# Patient Record
Sex: FEMALE | Race: WHITE | NOT HISPANIC OR LATINO | Employment: PART TIME | URBAN - METROPOLITAN AREA
[De-identification: names, ages, dates, MRNs, and addresses within clinical notes are randomized per-mention and may not be internally consistent; named-entity substitution may affect disease eponyms.]

---

## 2017-01-03 ENCOUNTER — OFFICE VISIT (OUTPATIENT)
Dept: MEDICAL GROUP | Facility: MEDICAL CENTER | Age: 57
End: 2017-01-03
Payer: COMMERCIAL

## 2017-01-03 VITALS
BODY MASS INDEX: 39.19 KG/M2 | WEIGHT: 258.6 LBS | TEMPERATURE: 98.1 F | HEIGHT: 68 IN | HEART RATE: 74 BPM | RESPIRATION RATE: 12 BRPM | DIASTOLIC BLOOD PRESSURE: 88 MMHG | OXYGEN SATURATION: 95 % | SYSTOLIC BLOOD PRESSURE: 118 MMHG

## 2017-01-03 DIAGNOSIS — J06.9 ACUTE URI: ICD-10-CM

## 2017-01-03 DIAGNOSIS — F33.1 MODERATE EPISODE OF RECURRENT MAJOR DEPRESSIVE DISORDER (HCC): ICD-10-CM

## 2017-01-03 PROCEDURE — 99203 OFFICE O/P NEW LOW 30 MIN: CPT | Performed by: NURSE PRACTITIONER

## 2017-01-03 ASSESSMENT — PATIENT HEALTH QUESTIONNAIRE - PHQ9: CLINICAL INTERPRETATION OF PHQ2 SCORE: 0

## 2017-01-03 NOTE — PROGRESS NOTES
"Chief Complaint   Patient presents with   • Otalgia     patient thinks she had the flu 1 week ago, she had cough, sore throat & fever, that has mostly gone away but she now has left ear pain, swollen glands & nasal congestion       HPI:  Patient is seen today with a week history of stuffy nose, ST, ear pain and cough. She is feeling better daily with exception of left ear pain and swollen glands. She denies chest pain, palpitations, shortness of breath, fever, chills, nausea, vomiting, rash or other associated symptoms. She has tried mucinex with a bit of relief.  Nonsmoker.   is sick too.      PMH,FH and medications reviewed and updated in the record.     Review of Systems   Negative for abdominal pain, wheezing, diarrhea    Exam:  Blood pressure 118/88, pulse 74, temperature 36.7 °C (98.1 °F), resp. rate 12, height 1.727 m (5' 8\"), weight 117.3 kg (258 lb 9.6 oz), SpO2 95 %.  Constitutional: Alert, no distress, plus 3 vital signs  Skin:  Warm, dry, no rashes invisible areas  Eye: Equal, round and reactive, conjunctiva clear  ENMT: Bilateral tympanic membranes are erythematous without perforation or tenderness. No maxillary sinus tenderness. Oropharynx is slightly injected without exudate. No tonsillar enlargement..    Neck: Mild anterior cervical, nontender lymphadenopathy  Respiratory: Unlabored respiration, lungs clear to auscultation, no wheezes, no rhonchi  Cardiovascular: Normal rate and rhythm, no murmur, no edema  Psych: Alert, pleasant, well-groomed, normal affect    A/P:  1. Acute URI  -Symptoms are improving. We'll refrain from antibiotics for the next 72 hours at least. She will email Friday if ear pain has not resolved. Okay to continue Mucinex, add on Sudafed for decongestant, push water intake and rest.    2. Moderate episode of recurrent major depressive disorder (HCC)  -Stable on Abilify and Cymbalta. She'll continue psychiatry.    Recommend saline nasal sprays, high water intake, vitamin C " and rest. The patient will follow up within 10 days if not completely resolved, sooner with any worsening.    No Follow-up on file.

## 2017-01-03 NOTE — MR AVS SNAPSHOT
"        Amy Denson   1/3/2017 9:20 AM   Office Visit   MRN: [id number]    Department:  [address]   Dept Phone:  [phone]    Description:  Female : 1960   Provider:  CLAUDIO Rodriguez           Reason for Visit     Otalgia patient thinks she had the flu 1 week ago, she had cough, sore throat & fever, that has mostly gone away but she now has left ear pain, swollen glands & nasal congestion      Allergies as of 1/3/2017     Not on File      You were diagnosed with     Acute URI   [621698]       Moderate episode of recurrent major depressive disorder (HCC)   [4846616]         Vital Signs     Blood Pressure Pulse Temperature Respirations Height Weight    118/88 mmHg 74 36.7 °C (98.1 °F) 12 1.727 m (5' 8\") 117.3 kg (258 lb 9.6 oz)    Body Mass Index Oxygen Saturation Smoking Status             39.33 kg/m2 95% Never Smoker          Basic Information     Date Of Birth Sex Race Ethnicity Preferred Language    1960 Female White Non- English      Health Maintenance     Patient has no pending health maintenance at this time      Current Immunizations     No immunizations on file.      Below and/or attached are the medications your provider expects you to take. Review all of your home medications and newly ordered medications with your provider and/or pharmacist. Follow medication instructions as directed by your provider and/or pharmacist. Please keep your medication list with you and share with your provider. Update the information when medications are discontinued, doses are changed, or new medications (including over-the-counter products) are added; and carry medication information at all times in the event of emergency situations     Allergies:  (Not on file)          Medications  Valid as of: 2017 -  9:44 AM    Generic Name Brand Name Tablet Size Instructions for use    .                 Medicines prescribed today were sent to:     None      Medication refill " instructions:       If your prescription bottle indicates you have medication refills left, it is not necessary to call your provider’s office. Please contact your pharmacy and they will refill your medication.    If your prescription bottle indicates you do not have any refills left, you may request refills at any time through one of the following ways: The online [employer] system (except Urgent Care), by calling your provider’s office, or by asking your pharmacy to contact your provider’s office with a refill request. Medication refills are processed only during regular business hours and may not be available until the next business day. Your provider may request additional information or to have a follow-up visit with you prior to refilling your medication.   *Please Note: Medication refills are assigned a new Rx number when refilled electronically. Your pharmacy may indicate that no refills were authorized even though a new prescription for the same medication is available at the pharmacy. Please request the medicine by name with the pharmacy before contacting your provider for a refill.           [employer] Access Code: [email or web address]  Expires: 2/2/2017  9:44 AM    [employer]  A secure, online tool to manage your health information     [email or web address]’s Easy[employer] Universe® is a secure, online tool that connects you to your personalized health information from the privacy of your home -- day or night - making it very easy for you to manage your healthcare. Once the activation process is completed, you can even access your medical information using the Easy[employer] Universe betina, which is available for free in the Apple Betina store or Google Play store.     Easy[employer] Universe provides the following levels of access (as shown below):   My Chart Features   [facility] Doctor [facility]  Specialists [facility]  Urgent  Care Non-[facility]  Primary Care  Doctor   Email your healthcare team securely and privately 24/7 X X X    Manage appointments: schedule your next  appointment; view details of past/upcoming appointments X      Request prescription refills. X      View recent personal medical records, including lab and immunizations X X X X   View health record, including health history, allergies, medications X X X X   Read reports about your outpatient visits, procedures, consult and ER notes X X X X   See your discharge summary, which is a recap of your hospital and/or ER visit that includes your diagnosis, lab results, and care plan. X X       How to register for [email or web address]:  1. Go to  [email or web address].  2. Click on the Sign Up Now box, which takes you to the New Member Sign Up page. You will need to provide the following information:  a. Enter your [employer] Access Code exactly as it appears at the top of this page. (You will not need to use this code after you’ve completed the sign-up process. If you do not sign up before the expiration date, you must request a new code.)   b. Enter your date of birth.   c. Enter your home email address.   d. Click Submit, and follow the next screen’s instructions.  3. Create a [employer] ID. This will be your [employer] login ID and cannot be changed, so think of one that is secure and easy to remember.  4. Create a [employer] password. You can change your password at any time.  5. Enter your Password Reset Question and Answer. This can be used at a later time if you forget your password.   6. Enter your e-mail address. This allows you to receive e-mail notifications when new information is available in [email or web address].  7. Click Sign Up. You can now view your health information.    For assistance activating your [email or web address] account, call [phone]

## 2017-01-05 ENCOUNTER — TELEPHONE (OUTPATIENT)
Dept: MEDICAL GROUP | Facility: MEDICAL CENTER | Age: 57
End: 2017-01-05

## 2017-01-05 ENCOUNTER — OFFICE VISIT (OUTPATIENT)
Dept: BEHAVIORAL HEALTH | Facility: PHYSICIAN GROUP | Age: 57
End: 2017-01-05
Payer: COMMERCIAL

## 2017-01-05 VITALS
WEIGHT: 260 LBS | BODY MASS INDEX: 39.4 KG/M2 | DIASTOLIC BLOOD PRESSURE: 93 MMHG | HEART RATE: 88 BPM | SYSTOLIC BLOOD PRESSURE: 140 MMHG | HEIGHT: 68 IN

## 2017-01-05 DIAGNOSIS — J06.9 ACUTE URI: ICD-10-CM

## 2017-01-05 DIAGNOSIS — F33.42 MDD (MAJOR DEPRESSIVE DISORDER), RECURRENT, IN FULL REMISSION (HCC): Primary | ICD-10-CM

## 2017-01-05 PROCEDURE — 99213 OFFICE O/P EST LOW 20 MIN: CPT | Performed by: PSYCHIATRY & NEUROLOGY

## 2017-01-05 RX ORDER — ARIPIPRAZOLE 5 MG/1
5 TABLET ORAL DAILY
Qty: 30 TAB | Refills: 3 | Status: SHIPPED | OUTPATIENT
Start: 2017-01-05 | End: 2017-05-01 | Stop reason: SDUPTHER

## 2017-01-05 RX ORDER — AZITHROMYCIN 250 MG/1
TABLET, FILM COATED ORAL
Qty: 6 TAB | Refills: 0 | Status: SHIPPED | OUTPATIENT
Start: 2017-01-05 | End: 2017-01-27

## 2017-01-05 RX ORDER — DULOXETIN HYDROCHLORIDE 60 MG/1
60 CAPSULE, DELAYED RELEASE ORAL DAILY
Qty: 30 CAP | Refills: 3 | Status: SHIPPED | OUTPATIENT
Start: 2017-01-05 | End: 2017-05-23 | Stop reason: SDUPTHER

## 2017-01-05 NOTE — PROGRESS NOTES
PSYCHIATRY FOLLOW-UP NOTE      Chief Complaint   Patient presents with   • Follow-Up     depression         History Of Present Illness:  Amy Denson is a 56 y.o. old female with major depressive disorder comes in today for follow up, was last seen 2 months ago. She reports doing a lot better in the last few months. She has been compliant with her Cymbalta and Abilify and feels that it is a really good combination for her mood. Denies feeling depressed, emotional or irritable lately. She denies problems with energy or motivation. She continues to enjoy her life and spending time with her family. Denies any new stressors. Sleep has improved since she switched her medications to morning, is no longer using melatonin to help her sleep. Appetite is good and has gained a few pounds in the last few months which she attributes to holidays. Denies having any visual problems anymore. Denies side effects from her current medications. Denies having thoughts of wanting to herself or others.    Social History:   Unemployed, on disability for depression since 2004.  x 2 and  x 1,  to her 2nd  since 2009. She has one daughter from her 1st marriage who is 30 yo and lives in [place], [place]. 3 grand kids. Lives with her  in a trailer in [place]    Substance Use:  Alcohol - Infrequent use  Nicotine - Denies  Illicit drugs - Denies    Past Medication Trials:  Celexa, Zoloft, Prozac, Paxil, Elavil, Lexapro, Effexor, Geodon, Seroquel    Medications:  Current Outpatient Prescriptions   Medication Sig Dispense Refill   • aripiprazole (ABILIFY) 5 MG tablet Take 1 Tab by mouth every day. 30 Tab 3   • duloxetine (CYMBALTA) 60 MG Cap DR Particles delayed-release capsule Take 1 Cap by mouth every day. 30 Cap 3   • MINIVELLE 0.05 MG/24HR PATCH BIWEEKLY APPLY ONE PATCH TOPICALLY 2 TIMES WEEK  11   • triamcinolone acetonide (KENALOG) 0.025 % CREA Apply 1 Application to affected area(s) 2 times a day. 45 g  "2     No current facility-administered medications for this visit.       Review Of Systems:    Constitutional - Negative for fatigue  HEENT - Positive for left ear pain  Respiratory - Negative for shortness of breath, cough  CVS - Negative for chest pain, palpitations  GI - Negative for nausea, vomiting, abdominal pain, diarrhea, constipation  Musculoskeletal - Negative for back pain  Neurological - Negative for headaches  Psychiatric - Negative for anxiety, depression    Physical Examination:  Vital signs: /93 mmHg  Pulse 88  Ht 1.727 m (5' 8\")  Wt 117.935 kg (260 lb)  BMI 39.54 kg/m2    Musculoskeletal: Normal gait. No abnormal movements.     Mental Status Evaluation:   General: Middle aged white female, dressed in casual attire, good grooming and hygiene, in no apparent distress, calm and cooperative, good eye contact, no psychomotor agitation or retardation  Orientation: Alert and oriented to person, place and time  Recent and remote memory: Grossly intact  Attention span and concentration: Grossly intact  Speech: Spontaneous, normal rate, rhythm and tone  Thought Process: Linear, logical and goal directed  Thought Content: Denies suicidal or homicidal ideations, intent or plan  Perception: Denies auditory or visual hallucinations. No delusions noted  Associations: Intact  Language: Appropriate  Fund of knowledge and vocabulary: Grossly adequate  Mood: \"am doing good\"  Affect: Euthymic, mood congruent  Insight: Good  Judgment: Good      Impression:  1. Major depressive disorder, recurrent, in full remission    Medical Records/Labs/Diagnostic Tests Reviewed:  [place]  records - no recent controlled medications  Reviewed recent lab results - A1c elevated at 5.9, LDL elevated at 121    Plan:  1. Continue Cymbalta 60 mg daily for mood  2. Continue Abilify 5 mg daily for mood              - AIMS 0 (9/2016)              - 3 month metabolic monitoring (11/2016): A1c elevated at 5.9, LDL elevated at " 121.              - Repeat A1c and lipid profile due in 11/2017   - Discussed dietary modification and exercise      Return to clinic in 4 months or sooner if symptoms worsen    The proposed treatment plan was discussed with the patient who was provided the opportunity to ask questions and make suggestions regarding alternative treatment. Patient verbalized understanding and expressed agreement with the plan.     Kimmy Gonzalez M.D.  01/05/2017    This note was created using voice recognition software (Dragon). The accuracy of the dictation is limited by the abilities of the software. I have reviewed the note prior to signing, however some errors in grammar and context are still possible. If you have any questions related to this note please do not hesitate to contact our office.

## 2017-01-27 ENCOUNTER — OFFICE VISIT (OUTPATIENT)
Dept: MEDICAL GROUP | Facility: MEDICAL CENTER | Age: 57
End: 2017-01-27
Payer: COMMERCIAL

## 2017-01-27 VITALS
BODY MASS INDEX: 39.56 KG/M2 | SYSTOLIC BLOOD PRESSURE: 132 MMHG | HEART RATE: 83 BPM | WEIGHT: 261 LBS | TEMPERATURE: 97 F | DIASTOLIC BLOOD PRESSURE: 84 MMHG | HEIGHT: 68 IN | OXYGEN SATURATION: 97 %

## 2017-01-27 DIAGNOSIS — H69.92 EUSTACHIAN TUBE DYSFUNCTION, LEFT: ICD-10-CM

## 2017-01-27 DIAGNOSIS — F33.42 MDD (MAJOR DEPRESSIVE DISORDER), RECURRENT, IN FULL REMISSION (HCC): ICD-10-CM

## 2017-01-27 PROCEDURE — 99214 OFFICE O/P EST MOD 30 MIN: CPT | Performed by: NURSE PRACTITIONER

## 2017-01-27 RX ORDER — FLUTICASONE PROPIONATE 50 MCG
2 SPRAY, SUSPENSION (ML) NASAL DAILY
Qty: 16 G | Refills: 5 | Status: SHIPPED | OUTPATIENT
Start: 2017-01-27 | End: 2019-10-14

## 2017-01-27 NOTE — PROGRESS NOTES
"Chief Complaint   Patient presents with   • Follow-Up   • Medication Management       HPI  Amy is a 56-year-old established female here with a few issues:  #1-she continues with left ear fullness and sore throat since our last visit on January 5. She denies any fevers, chills or purulent mucus production. Occasionally her ear feels like it's going to pop. Denies any other associated symptoms. Has tried Sudafed numerous times without improvement.  Finished zpak several weeks ago and did feel 90% better.    #2-major depressive disorder: She is very happy with her improvement. She is currently doing well with Cymbalta and Abilify. She is very diligent about taking Cymbalta and Abilify on a daily basis. She is sleeping well at night and her appetite is great. Her motivation is improving by the day for exercise and activity. Denies any suicidal or homicidal ideations.  #3-obesity: Worsening issue for her. Reports a very good appetite and overindulging in sweets as well as carbohydrates. She is trying to drink a lot of water. She's been walking her dog for 10 minutes a day.      Past medical, surgical, family, and social history is reviewed and updated in Epic chart by me today.   Medications and allergies reviewed and updated in Epic chart by me today.     ROS:   Denies n/v/d    Exam:  Blood pressure 132/84, pulse 83, temperature 36.1 °C (97 °F), height 1.727 m (5' 7.99\"), weight 118.389 kg (261 lb), last menstrual period 12/27/2016, SpO2 97 %, not currently breastfeeding.  Constitutional: Overweight female, Alert, no distress, plus 3 vital signs  Skin:  Warm, dry, no rashes invisible areas  Eye: Equal, round and reactive, conjunctiva clear  ENMT: Lips without lesions, good dentition, oropharynx clear . Left tympanic membrane is retracted but translucent without obvious effusion or perforation.   Neck: Trachea midline, no masses.  Respiratory: Unlabored respiration, lungs clear to auscultation  Cardiovascular: " Normal rate   Abdomen: Soft, nontender  Psych: Alert, pleasant, well-groomed, normal affect    A/P:  1. Eustachian tube dysfunction, left  -Trial of Flonase 2 sprays each nostril once daily for the next 2 weeks and she is agreeable to emailing me if not improving.  - fluticasone (FLONASE) 50 MCG/ACT nasal spray; Spray 2 Sprays in nose every day.  Dispense: 16 g; Refill: 5    2. MDD (major depressive disorder), recurrent, in full remission (CMS-[facility])  -Improved. She'll continue on same medications. She'll follow up with psychiatry as scheduled in May.     3. BMI 39.0-39.9,adult  -We had a long discussion about avoiding all white carbohydrates and sugars. Discussed a high-fiber, lean protein diet. Encouraged her to work up to 30 minutes of walking her dog daily. Discussed buying a scale and weighing herself every morning, making sure she does not go beyond 260 pounds. She'll follow-up with me in 3 months and will check an A1c in the office. Discussed potential for weight gain with Abilify and she verbalized understanding.

## 2017-01-27 NOTE — MR AVS SNAPSHOT
"Amy Denson   2017 1:00 PM   Office Visit   MRN: [id number]    Department:  [address]   Dept Phone:  [phone]    Description:  Female : 1960   Provider:  CLAUDIO Rodriguez           Reason for Visit     Follow-Up     Medication Management           Allergies as of 2017     Allergen Noted Reactions    Augmentin [Amoxicillin-Pot Clavulanate] 2014   Diarrhea    Sulfa Drugs 2013       Rash      You were diagnosed with     Eustachian tube dysfunction, left   [690188]       MDD (major depressive disorder), recurrent, in full remission (CMS-HCC)   [380936]         Vital Signs     Blood Pressure Pulse Temperature Height Weight Body Mass Index    132/84 mmHg 83 36.1 °C (97 °F) 1.727 m (5' 7.99\") 118.389 kg (261 lb) 39.69 kg/m2    Oxygen Saturation Last Menstrual Period Breastfeeding? Smoking Status          97% 2016 No Never Smoker         Basic Information     Date Of Birth Sex Race Ethnicity Preferred Language    1960 Female White Non- English      Your appointments     May 05, 2017  9:30 AM   Follow Up Med Management with Kimmy Gonzalez M.D.   [facility] [address] [place] [address])    [address] [place] [id number]   [phone]              Problem List              ICD-10-CM Priority Class Noted - Resolved    Obesity E66.9   2011 - Present    Thyroid nodule E04.1   2015 - Present    Abnormal vaginal bleeding N93.9   2015 - Present    MDD (major depressive disorder), recurrent, in full remission (CMS-HCC) F33.42   2017 - Present      Health Maintenance        Date Due Completion Dates    IMM DTaP/Tdap/Td Vaccine (1 - Tdap) 3/19/1981 ---    MAMMOGRAM 2016, 2013, 12/15/2011, 2007, 2007    IMM INFLUENZA (1) 2016 ---    PAP SMEAR 2018 (Done)    Override on 2015: Done (Dr. Boogie)    COLONOSCOPY 2018            Current Immunizations     No " immunizations on file.      Below and/or attached are the medications your provider expects you to take. Review all of your home medications and newly ordered medications with your provider and/or pharmacist. Follow medication instructions as directed by your provider and/or pharmacist. Please keep your medication list with you and share with your provider. Update the information when medications are discontinued, doses are changed, or new medications (including over-the-counter products) are added; and carry medication information at all times in the event of emergency situations     Allergies:  AUGMENTIN - Diarrhea     SULFA DRUGS - (reactions not documented)               Medications  Valid as of: January 27, 2017 -  1:19 PM    Generic Name Brand Name Tablet Size Instructions for use    ARIPiprazole (Tab) ABILIFY 5 MG Take 1 Tab by mouth every day.        Azithromycin (Tab) ZITHROMAX 250  mg day one, 250 mg day 2-5        DULoxetine HCl (Cap DR Particles) CYMBALTA 60 MG Take 1 Cap by mouth every day.        Estradiol (PATCH BIWEEKLY) MINIVELLE 0.05 MG/24HR APPLY ONE PATCH TOPICALLY 2 TIMES WEEK        Fluticasone Propionate (Suspension) FLONASE 50 MCG/ACT Spray 2 Sprays in nose every day.        Triamcinolone Acetonide (Cream) KENALOG 0.025 % Apply 1 Application to affected area(s) 2 times a day.        .                 Medicines prescribed today were sent to:     [id number] [address]    [address] [facility] [address] [address]    Phone: [phone] Fax: [phone]    Open 24 Hours?: No      Medication refill instructions:       If your prescription bottle indicates you have medication refills left, it is not necessary to call your provider’s office. Please contact your pharmacy and they will refill your medication.    If your prescription bottle indicates you do not have any refills left, you may request refills at any time through one of the following ways: The online [employer]  system (except Urgent Care), by calling your provider’s office, or by asking your pharmacy to contact your provider’s office with a refill request. Medication refills are processed only during regular business hours and may not be available until the next business day. Your provider may request additional information or to have a follow-up visit with you prior to refilling your medication.   *Please Note: Medication refills are assigned a new Rx number when refilled electronically. Your pharmacy may indicate that no refills were authorized even though a new prescription for the same medication is available at the pharmacy. Please request the medicine by name with the pharmacy before contacting your provider for a refill.           [employer] Access Code: Activation code not generated  Current [facility] Status: Active

## 2017-04-27 ENCOUNTER — OFFICE VISIT (OUTPATIENT)
Dept: MEDICAL GROUP | Facility: LAB | Age: 57
End: 2017-04-27
Payer: COMMERCIAL

## 2017-04-27 VITALS
WEIGHT: 276 LBS | RESPIRATION RATE: 12 BRPM | TEMPERATURE: 99.3 F | DIASTOLIC BLOOD PRESSURE: 80 MMHG | SYSTOLIC BLOOD PRESSURE: 130 MMHG | HEIGHT: 68 IN | HEART RATE: 86 BPM | BODY MASS INDEX: 41.83 KG/M2 | OXYGEN SATURATION: 94 %

## 2017-04-27 DIAGNOSIS — R73.03 PREDIABETES: ICD-10-CM

## 2017-04-27 DIAGNOSIS — F33.42 MDD (MAJOR DEPRESSIVE DISORDER), RECURRENT, IN FULL REMISSION (HCC): ICD-10-CM

## 2017-04-27 LAB
HBA1C MFR BLD: NORMAL % (ref ?–5.8)
INT CON NEG: NORMAL
INT CON POS: NORMAL

## 2017-04-27 PROCEDURE — 83036 HEMOGLOBIN GLYCOSYLATED A1C: CPT | Performed by: NURSE PRACTITIONER

## 2017-04-27 PROCEDURE — 99214 OFFICE O/P EST MOD 30 MIN: CPT | Performed by: NURSE PRACTITIONER

## 2017-04-27 NOTE — MR AVS SNAPSHOT
"Amy Denson   2017 11:00 AM   Office Visit   MRN: [id number]    Department:  [facility]   Dept Phone:  [phone]    Description:  Female : 1960   Provider:  CLAUDIO Rodriguez           Reason for Visit     Depression F/V on depression and anxiety, pt states she is doing much better    Other pt states she had high BP reading when going to the dentist, 162/108,     Orders Needed mammogram       Allergies as of 2017     Allergen Noted Reactions    Augmentin [Amoxicillin-Pot Clavulanate] 2014   Diarrhea    Sulfa Drugs 2013       Rash      You were diagnosed with     Prediabetes   [468677]       Elevated blood pressure   [811642]         Vital Signs     Blood Pressure Pulse Temperature Respirations Height Weight    130/80 mmHg 86 37.4 °C (99.3 °F) 12 1.727 m (5' 7.99\") 125.193 kg (276 lb)    Body Mass Index Oxygen Saturation Smoking Status             41.98 kg/m2 94% Never Smoker          Basic Information     Date Of Birth Sex Race Ethnicity Preferred Language    1960 Female White Non- English      Your appointments     May 05, 2017  9:30 AM   Follow Up Med Management with Kimmy Gonzalez M.D.   [facility] [address] [place] [address])    [address] [place] [address] [phone]            May 09, 2017 10:00 AM   Established Patient with CLAUDIO Rodriguez   [facility] (--)    [address] [address] [address] [phone]           You will be receiving a confirmation call a few days before your appointment from our automated call confirmation system.              Problem List              ICD-10-CM Priority Class Noted - Resolved    Obesity E66.9   2011 - Present    Thyroid nodule E04.1   2015 - Present    Abnormal vaginal bleeding N93.9   2015 - Present    MDD (major depressive disorder), recurrent, in full remission (CMS-HCC) F33.42   2017 - Present   "      Health Maintenance        Date Due Completion Dates    IMM DTaP/Tdap/Td Vaccine (1 - Tdap) 3/19/1981 ---    MAMMOGRAM 6/29/2016 6/29/2015, 2/1/2013, 12/15/2011, 2/14/2007, 2/14/2007    PAP SMEAR 6/2/2018 6/2/2015 (Done)    Override on 6/2/2015: Done (Dr. Boogie)    COLONOSCOPY 7/17/2018 7/17/2015            Current Immunizations     No immunizations on file.      Below and/or attached are the medications your provider expects you to take. Review all of your home medications and newly ordered medications with your provider and/or pharmacist. Follow medication instructions as directed by your provider and/or pharmacist. Please keep your medication list with you and share with your provider. Update the information when medications are discontinued, doses are changed, or new medications (including over-the-counter products) are added; and carry medication information at all times in the event of emergency situations     Allergies:  AUGMENTIN - Diarrhea     SULFA DRUGS - (reactions not documented)               Medications  Valid as of: April 27, 2017 - 11:45 AM    Generic Name Brand Name Tablet Size Instructions for use    ARIPiprazole (Tab) ABILIFY 5 MG Take 1 Tab by mouth every day.        DULoxetine HCl (Cap DR Particles) CYMBALTA 60 MG Take 1 Cap by mouth every day.        Estradiol (PATCH BIWEEKLY) MINIVELLE 0.05 MG/24HR APPLY ONE PATCH TOPICALLY 2 TIMES WEEK        Fluticasone Propionate (Suspension) FLONASE 50 MCG/ACT Spray 2 Sprays in nose every day.        Triamcinolone Acetonide (Cream) KENALOG 0.025 % Apply 1 Application to affected area(s) 2 times a day.        .                 Medicines prescribed today were sent to:     [facility]/PHARMACY #[id number] - [place] - [address]    [address] [facility] [place] [address]    Phone: [phone] Fax: [phone]    Open 24 Hours?: No      Medication refill instructions:       If your prescription bottle indicates you have medication refills left, it is not necessary  to call your provider’s office. Please contact your pharmacy and they will refill your medication.    If your prescription bottle indicates you do not have any refills left, you may request refills at any time through one of the following ways: The online [employer] system (except Urgent Care), by calling your provider’s office, or by asking your pharmacy to contact your provider’s office with a refill request. Medication refills are processed only during regular business hours and may not be available until the next business day. Your provider may request additional information or to have a follow-up visit with you prior to refilling your medication.   *Please Note: Medication refills are assigned a new Rx number when refilled electronically. Your pharmacy may indicate that no refills were authorized even though a new prescription for the same medication is available at the pharmacy. Please request the medicine by name with the pharmacy before contacting your provider for a refill.           [employer] Access Code: Activation code not generated  Current [employer] Status: Active

## 2017-04-27 NOTE — PROGRESS NOTES
Chief Complaint   Patient presents with   • Depression     F/V on depression and anxiety, pt states she is doing much better   • Other     pt states she had high BP reading when going to the dentist, 162/108,    • Orders Needed     mammogram        HPI  Amy is a 56 yo est female here with a few issues:   #1- new onset elevated BP:  She reports that she was at her dentist's office 2 days ago and she was rescheduled for some fillings because of a blood pressure 162/108. She reports that a wrist BP monitor was used @ dentist.  Her dentist is not new to her and she states that she was not in pain. She denies having any chest pain, leg swelling or shortness of breath.  She had missed her abilify and cymbalta the day prior to her dental appt. she does have a blood pressure monitor at home and she has been checking her blood pressure periodically over the past 2 days cannot recall exact readings, she thinks they have been around 170/80. Next appt at her dentist is 5/11/2017. She reports that in the past she's been told by a suitable that her blood pressure was borderline but she's never been diagnosed or treated for hypertension.    #2-anxiety with depression: Now treated by psychiatry. Feels her moods are very stable with Abilify and Cymbalta.    #3-history of elevated A1c:  Last A1c was done in November and was 5.9. She has had unfortunately another 15 pound weight gain in the past 3 months. She is not exercising at all and she reports that she enjoys a lot of starches, such as white rice and potatoes. She typically eats oatmeal for breakfast. She does not smoke. She denies any symptoms related to diabetes. She's never been diagnosed with type 2 diabetes.     Past medical, surgical, family, and social history is reviewed and updated in Epic chart by me today.   Medications and allergies reviewed and updated in Epic chart by me today.     ROS:   As documented in history of present illness above    Exam:  Blood  "pressure 130/80, pulse 86, temperature 37.4 °C (99.3 °F), resp. rate 12, height 1.727 m (5' 7.99\"), weight 125.193 kg (276 lb), SpO2 94 %.  Constitutional: Obese female, Alert, no distress, plus 3 vital signs  Skin:  Warm, dry, no rashes invisible areas  Eye: Equal, round and reactive, conjunctiva clear  ENMT: Lips without lesions, good dentition, oropharynx clear    Neck: Trachea midline  Respiratory: Unlabored respiration, lungs clear to auscultation, no wheezes, no rhonchi  Cardiovascular: Normal rate and rhythm, no murmur, no edema  Psych: Alert, pleasant, well-groomed, normal affect    A/P:  1. Prediabetes  -Unfortunately her A1c is up to 6.2 today. We had a long discussion about lowering her carbohydrate intake. We discussed foods that are low on the glycemic index. I encouraged her to start moving more through any type of exercise that she enjoys. Recommend a recheck of her A1c in 3 months. We discussed the dangers of type 2 diabetes as well as potential repercussions. She had no further dietary questions and states that she had already planned on starting an exercise program. She will follow-up or email me if she has any questions prior to her next visit.  - POCT  A1C    2. Elevated blood pressure  -Blood pressure was fine at our visit today. She was given a blood pressure log to keep track of her blood pressure, weight and pulse for the next 2 weeks and follow up here prior to her next dental appointment. Encouraged her to avoid salt, start exercising and call if blood pressure readings are consistently greater than 150/90 prior to her next visit.    3. MDD (major depressive disorder), recurrent, in full remission (CMS-HCC)  -Much improved/stable with her current psychiatrist and medications.      "

## 2017-04-27 NOTE — Clinical Note
BLOOD PRESSURE LOG FOR: Amy Valdesbarbara Denson    DATE/TIME  AM WEIGHT BLOOD  PRESSURE PULSE TIME  PM BLOOD  PRESSURE   PULSE                                                                                                                                                                                                                                        Current Blood Pressure Medications: (dose and frequency)    MEDICATION DOSE FREQUENCY   none                      Please joseph any medication change (increase/decrease/new med) with a *.

## 2017-05-05 ENCOUNTER — APPOINTMENT (OUTPATIENT)
Dept: BEHAVIORAL HEALTH | Facility: PHYSICIAN GROUP | Age: 57
End: 2017-05-05
Payer: MEDICARE

## 2017-05-09 ENCOUNTER — OFFICE VISIT (OUTPATIENT)
Dept: MEDICAL GROUP | Facility: LAB | Age: 57
End: 2017-05-09
Payer: COMMERCIAL

## 2017-05-09 VITALS
BODY MASS INDEX: 40.16 KG/M2 | HEART RATE: 87 BPM | RESPIRATION RATE: 12 BRPM | OXYGEN SATURATION: 93 % | SYSTOLIC BLOOD PRESSURE: 128 MMHG | WEIGHT: 265 LBS | DIASTOLIC BLOOD PRESSURE: 74 MMHG | HEIGHT: 68 IN | TEMPERATURE: 98.2 F

## 2017-05-09 DIAGNOSIS — F33.42 MDD (MAJOR DEPRESSIVE DISORDER), RECURRENT, IN FULL REMISSION (HCC): ICD-10-CM

## 2017-05-09 DIAGNOSIS — J06.9 ACUTE URI: ICD-10-CM

## 2017-05-09 DIAGNOSIS — R03.0 ELEVATED BLOOD PRESSURE, SITUATIONAL: ICD-10-CM

## 2017-05-09 PROCEDURE — 99214 OFFICE O/P EST MOD 30 MIN: CPT | Performed by: NURSE PRACTITIONER

## 2017-05-09 NOTE — MR AVS SNAPSHOT
"Amy Denson   2017 10:00 AM   Office Visit   MRN: [id number]    Department:  [facility]   Dept Phone:  [phone]    Description:  Female : 1960   Provider:  CLAUDIO Rodriguez           Reason for Visit     Other F/V on high BP while at the dentist     Pharyngitis pt has a slight sore throat, nasal congestion, some cough, onset 3 days      Allergies as of 2017     Allergen Noted Reactions    Augmentin [Amoxicillin-Pot Clavulanate] 2014   Diarrhea    Sulfa Drugs 2013       Rash      You were diagnosed with     Acute URI   [116623]       Elevated blood pressure, situational   [282522]       MDD (major depressive disorder), recurrent, in full remission (CMS-HCC)   [382192]         Vital Signs     Blood Pressure Pulse Temperature Respirations Height Weight    128/74 mmHg 87 36.8 °C (98.2 °F) 12 1.727 m (5' 7.99\") 120.203 kg (265 lb)    Body Mass Index Oxygen Saturation Smoking Status             40.30 kg/m2 93% Never Smoker          Basic Information     Date Of Birth Sex Race Ethnicity Preferred Language    1960 Female White Non- English      Your appointments     2017  9:00 AM   Follow Up Med Management with Kimmy Gonzalez M.D.   BEHAVIORAL HEALTH [address] [place] [address])    [address] [place] [address]   [phone]              Problem List              ICD-10-CM Priority Class Noted - Resolved    Obesity E66.9   2011 - Present    Thyroid nodule E04.1   2015 - Present    Abnormal vaginal bleeding N93.9   2015 - Present    MDD (major depressive disorder), recurrent, in full remission (CMS-HCC) F33.42   2017 - Present    Prediabetes R73.03   2017 - Present      Health Maintenance        Date Due Completion Dates    IMM DTaP/Tdap/Td Vaccine (1 - Tdap) 3/19/1981 ---    MAMMOGRAM 2016, 2013, 12/15/2011, 2007, 2007    PAP SMEAR 2018 (Done)    Override on " 6/2/2015: Done (Dr. Boogie)    COLONOSCOPY 7/17/2018 7/17/2015            Current Immunizations     No immunizations on file.      Below and/or attached are the medications your provider expects you to take. Review all of your home medications and newly ordered medications with your provider and/or pharmacist. Follow medication instructions as directed by your provider and/or pharmacist. Please keep your medication list with you and share with your provider. Update the information when medications are discontinued, doses are changed, or new medications (including over-the-counter products) are added; and carry medication information at all times in the event of emergency situations     Allergies:  AUGMENTIN - Diarrhea     SULFA DRUGS - (reactions not documented)               Medications  Valid as of: May 09, 2017 - 10:21 AM    Generic Name Brand Name Tablet Size Instructions for use    ARIPiprazole (Tab) ABILIFY 5 MG TAKE 1 TAB BY MOUTH EVERY DAY.        DULoxetine HCl (Cap DR Particles) CYMBALTA 60 MG Take 1 Cap by mouth every day.        Estradiol (PATCH BIWEEKLY) MINIVELLE 0.05 MG/24HR APPLY ONE PATCH TOPICALLY 2 TIMES WEEK        Fluticasone Propionate (Suspension) FLONASE 50 MCG/ACT Spray 2 Sprays in nose every day.        Triamcinolone Acetonide (Cream) KENALOG 0.025 % Apply 1 Application to affected area(s) 2 times a day.        .                 Medicines prescribed today were sent to:     [facility]/PHARMACY #[id number] [place], [place] - [address] [facility] [address]    [address] [facility] [address]    Phone: [phone] Fax: [phone]    Open 24 Hours?: No      Medication refill instructions:       If your prescription bottle indicates you have medication refills left, it is not necessary to call your provider’s office. Please contact your pharmacy and they will refill your medication.    If your prescription bottle indicates you do not have any refills left, you may request refills at any time through one of the following  ways: The online [employer] system (except Urgent Care), by calling your provider’s office, or by asking your pharmacy to contact your provider’s office with a refill request. Medication refills are processed only during regular business hours and may not be available until the next business day. Your provider may request additional information or to have a follow-up visit with you prior to refilling your medication.   *Please Note: Medication refills are assigned a new Rx number when refilled electronically. Your pharmacy may indicate that no refills were authorized even though a new prescription for the same medication is available at the pharmacy. Please request the medicine by name with the pharmacy before contacting your provider for a refill.           [employer] Access Code: Activation code not generated  Current [employer] Status: Active

## 2017-05-09 NOTE — PROGRESS NOTES
"Chief Complaint   Patient presents with   • Other     F/V on high BP while at the dentist    • Pharyngitis     pt has a slight sore throat, nasal congestion, some cough, onset 3 days       HPI  Amy is a 57-year-old established female here to follow-up on elevated blood pressure at her dental appointment a few weeks ago. She's been keeping track of her blood pressure at home on a home blood pressure log and it has looked great. BP range 117/71 - 143/89  - average 121/80.  She denies any problems with chest pain or leg swelling. She has been trying to walk more often and decrease her salt intake. She has lost 10 pounds since her last visit. Her next dental appointment is in 48 hours.    She's also had a cold for the past 3 days with sore throat, congestion and a cough. Her symptoms are improving. She would like for me to listen to her lungs as she heard herself wheezing this morning. Denies fevers, chills or purulent mucus production. Not taking anything for her symptoms.    Anxiety with depression: Still feeling great in terms of her moods. Doing well with Abilify and Cymbalta.    Past medical, surgical, family, and social history is reviewed and updated in Epic chart by me today.   Medications and allergies reviewed and updated in Epic chart by me today.     ROS:   As documented in history of present illness above    Exam:  Blood pressure 128/74, pulse 87, temperature 36.8 °C (98.2 °F), resp. rate 12, height 1.727 m (5' 7.99\"), weight 120.203 kg (265 lb), SpO2 93 %.  Constitutional: Alert, no distress, plus 3 vital signs  Skin:  Warm, dry, no rashes invisible areas  Eye: Equal, round and reactive, conjunctiva clear  ENMT: Lips without lesions, good dentition, oropharynx clear    Neck: Trachea midline, no masses, no thyromegaly  Respiratory: Unlabored respiration, lungs clear to auscultation, no wheezes, no rhonchi  Cardiovascular: Normal rate and rhythm, no murmur, no edema  Psych: Alert, pleasant, " well-groomed, normal affect    A/P:  1. Acute URI  -Resolving. Reviewed symptomatic treatment with her. Discussed deep breathing exercises and high water intake. No wheezing heard today.    2. Elevated blood pressure, situational  -Blood pressure log is excellent with only 2 readings over the past 2 weeks, above 140/85. Continue off medication, concentrate on a low-sodium diet, weight loss and exercise. She'll continue with her blood pressure log over the next 2 weeks and drop it off for me.    3. MDD (major depressive disorder), recurrent, in full remission (CMS-[facility])  -Doing excellent. Continue same.

## 2017-05-23 RX ORDER — DULOXETIN HYDROCHLORIDE 60 MG/1
CAPSULE, DELAYED RELEASE ORAL
Qty: 30 CAP | Refills: 0 | Status: SHIPPED | OUTPATIENT
Start: 2017-05-23 | End: 2017-08-02 | Stop reason: SDUPTHER

## 2017-06-14 ENCOUNTER — OFFICE VISIT (OUTPATIENT)
Dept: BEHAVIORAL HEALTH | Facility: PHYSICIAN GROUP | Age: 57
End: 2017-06-14
Payer: COMMERCIAL

## 2017-06-14 VITALS
WEIGHT: 280 LBS | HEART RATE: 66 BPM | HEIGHT: 68 IN | SYSTOLIC BLOOD PRESSURE: 144 MMHG | DIASTOLIC BLOOD PRESSURE: 89 MMHG | BODY MASS INDEX: 42.44 KG/M2

## 2017-06-14 DIAGNOSIS — F33.42 MDD (MAJOR DEPRESSIVE DISORDER), RECURRENT, IN FULL REMISSION (HCC): ICD-10-CM

## 2017-06-14 PROCEDURE — 99213 OFFICE O/P EST LOW 20 MIN: CPT | Performed by: PSYCHIATRY & NEUROLOGY

## 2017-06-14 RX ORDER — ARIPIPRAZOLE 2 MG/1
2 TABLET ORAL DAILY
Qty: 30 TAB | Refills: 2 | Status: SHIPPED | OUTPATIENT
Start: 2017-06-14 | End: 2017-09-14

## 2017-06-14 NOTE — PROGRESS NOTES
PSYCHIATRY FOLLOW-UP NOTE      Chief Complaint   Patient presents with   • Follow-Up     depression         History Of Present Illness:  Amy Denson is a 56 y.o. old female with major depressive disorder, prediabetes comes in today for follow up, was last seen 4 months ago. She reports doing good since her last visit here. She has been compliant with Cymbalta and Abilify and endorses benefit from both of those medications. She is concerned about weight gain being a potential side effect from Abilify. She has made some dietary modifications and has been finding it hard to lose weight. She is not physically active at this time. Denies any other side effects from either of her medications. Denies any recent crying spells or feeling irritable or agitated. Denies any current stressors. Denies anhedonia, enjoys spending time with her family. She and her  have been traveling and camping a lot which she really enjoys. Sleep and appetite have been good. Denies any current problems with anxiety.    Social History:   Unemployed, on disability for depression since 2004.  x 2 and  x 1,  to her 2nd  since 2009. She has a daughter from her first marriage and 3 grand kids and they lives in [place], [place]. She lives with her  in a trailer in [place]    Substance Use:  Alcohol - Infrequent use  Nicotine - Denies  Illicit drugs - Denies    Past Medication Trials:  Celexa, Zoloft, Prozac, Paxil, Elavil, Lexapro, Effexor, Geodon, Seroquel    Medications:  Current Outpatient Prescriptions   Medication Sig Dispense Refill   • aripiprazole (ABILIFY) 2 MG tablet Take 1 Tab by mouth every day. 30 Tab 2   • duloxetine (CYMBALTA) 60 MG Cap DR Particles delayed-release capsule TAKE 1 CAP BY MOUTH EVERY DAY. 30 Cap 0   • fluticasone (FLONASE) 50 MCG/ACT nasal spray Spray 2 Sprays in nose every day. 16 g 5   • MINIVELLE 0.05 MG/24HR PATCH BIWEEKLY APPLY ONE PATCH TOPICALLY 2 TIMES WEEK  11  "  • triamcinolone acetonide (KENALOG) 0.025 % CREA Apply 1 Application to affected area(s) 2 times a day. 45 g 2     No current facility-administered medications for this visit.       Review Of Systems:    Constitutional - Negative for fatigue  HEENT - Negative for nasal congestion, hearing loss  Respiratory - Negative for shortness of breath, cough  CVS - Negative for chest pain, palpitations  GI - Negative for nausea, vomiting, abdominal pain, diarrhea, constipation  Musculoskeletal - Negative for back pain  Neurological - Negative for headaches  Psychiatric - Negative for anxiety, depression    Physical Examination:  Vital signs: /89 mmHg  Pulse 66  Ht 1.727 m (5' 8\")  Wt 127.007 kg (280 lb)  BMI 42.58 kg/m2    Musculoskeletal: Normal gait. No abnormal movements.     Mental Status Evaluation:   General: Middle aged white female, dressed in casual attire, good grooming and hygiene, in no apparent distress, calm and cooperative, good eye contact, no psychomotor agitation or retardation  Orientation: Alert and oriented to person, place and time  Recent and remote memory: Grossly intact  Attention span and concentration: Grossly intact  Speech: Spontaneous, normal rate, rhythm and tone  Thought Process: Linear, logical and goal directed  Thought Content: Denies suicidal or homicidal ideations, intent or plan  Perception: Denies auditory or visual hallucinations. No delusions noted  Associations: Intact  Language: Appropriate  Fund of knowledge and vocabulary: Grossly adequate  Mood: \"pretty good\"  Affect: Euthymic, mood congruent  Insight: Good  Judgment: Good      Impression:  1. Major depressive disorder, recurrent, in full remission    Medical Records/Labs/Diagnostic Tests Reviewed:  [facility] records - no controlled medications in the last 1 year    Plan:  1. Continue Cymbalta 60 mg daily for depression   2. Decrease Abilify to 2 mg daily for depression augmentation given stable mood symptoms and concerns " for weight gain              - AIMS 0 (9/2016)              - 3 month metabolic monitoring (11/2016): A1c elevated at 5.9, LDL elevated at 121.              - Repeat A1c and lipid profile due in 11/2017  3. Discussed healthy eating and encouraged her to start exercising for at least 30 minutes 3-4 times a week.    Return to clinic in 3 months or sooner if symptoms worsen    The proposed treatment plan was discussed with the patient who was provided the opportunity to ask questions and make suggestions regarding alternative treatment. Patient verbalized understanding and expressed agreement with the plan.     Kimmy Gonzalez M.D.  06/14/2017    This note was created using voice recognition software (Dragon). The accuracy of the dictation is limited by the abilities of the software. I have reviewed the note prior to signing, however some errors in grammar and context are still possible. If you have any questions related to this note please do not hesitate to contact our office.

## 2017-08-02 RX ORDER — DULOXETIN HYDROCHLORIDE 60 MG/1
CAPSULE, DELAYED RELEASE ORAL
Qty: 30 CAP | Refills: 1 | Status: SHIPPED | OUTPATIENT
Start: 2017-08-02 | End: 2017-09-14 | Stop reason: SDUPTHER

## 2017-09-14 ENCOUNTER — OFFICE VISIT (OUTPATIENT)
Dept: BEHAVIORAL HEALTH | Facility: PHYSICIAN GROUP | Age: 57
End: 2017-09-14
Payer: COMMERCIAL

## 2017-09-14 VITALS
SYSTOLIC BLOOD PRESSURE: 128 MMHG | DIASTOLIC BLOOD PRESSURE: 90 MMHG | BODY MASS INDEX: 41.68 KG/M2 | HEART RATE: 68 BPM | HEIGHT: 68 IN | WEIGHT: 275 LBS

## 2017-09-14 DIAGNOSIS — Z79.899 ENCOUNTER FOR LONG-TERM (CURRENT) USE OF OTHER MEDICATIONS: ICD-10-CM

## 2017-09-14 DIAGNOSIS — F33.42 MDD (MAJOR DEPRESSIVE DISORDER), RECURRENT, IN FULL REMISSION (HCC): ICD-10-CM

## 2017-09-14 PROCEDURE — 99213 OFFICE O/P EST LOW 20 MIN: CPT | Performed by: PSYCHIATRY & NEUROLOGY

## 2017-09-14 RX ORDER — ARIPIPRAZOLE 2 MG/1
1 TABLET ORAL DAILY
Qty: 30 TAB | Refills: 1 | Status: SHIPPED | OUTPATIENT
Start: 2017-09-14 | End: 2017-12-14

## 2017-09-14 RX ORDER — DULOXETIN HYDROCHLORIDE 60 MG/1
60 CAPSULE, DELAYED RELEASE ORAL
Qty: 30 CAP | Refills: 2 | Status: SHIPPED | OUTPATIENT
Start: 2017-09-14 | End: 2017-12-14 | Stop reason: SDUPTHER

## 2017-09-14 ASSESSMENT — PATIENT HEALTH QUESTIONNAIRE - PHQ9
4. FEELING TIRED OR HAVING LITTLE ENERGY: 0
2. FEELING DOWN, DEPRESSED, IRRITABLE, OR HOPELESS: 0
5. POOR APPETITE OR OVEREATING: 0
6. FEELING BAD ABOUT YOURSELF - OR THAT YOU ARE A FAILURE OR HAVE LET YOURSELF OR YOUR FAMILY DOWN: 0
7. TROUBLE CONCENTRATING ON THINGS, SUCH AS READING THE NEWSPAPER OR WATCHING TELEVISION: 0
SUM OF ALL RESPONSES TO PHQ QUESTIONS 1-9: 1
3. TROUBLE FALLING OR STAYING ASLEEP OR SLEEPING TOO MUCH: 1
9. THOUGHTS THAT YOU WOULD BE BETTER OFF DEAD, OR OF HURTING YOURSELF: 0
8. MOVING OR SPEAKING SO SLOWLY THAT OTHER PEOPLE COULD HAVE NOTICED. OR THE OPPOSITE, BEING SO FIGETY OR RESTLESS THAT YOU HAVE BEEN MOVING AROUND A LOT MORE THAN USUAL: 0
1. LITTLE INTEREST OR PLEASURE IN DOING THINGS: 0
SUM OF ALL RESPONSES TO PHQ9 QUESTIONS 1 AND 2: 0

## 2017-09-14 NOTE — PROGRESS NOTES
PSYCHIATRY FOLLOW-UP NOTE      Chief Complaint   Patient presents with   • Follow-Up     depression         History Of Present Illness:  Amy Denson is a 57 y.o. old female with major depressive disorder, prediabetes comes in today for follow up, was last seen 3 months ago. She reports doing. Since her last visit here. She was able to cut down on her dose of Abilify and has not noticed any changes in her mood or anxiety. She continues to do good in regards to depression and has not noticed any depression in the last few months. Denies feeling irritable or angry. She denies any current stressors in her life. Energy has been good. Appetite has been stable as well. She has lost 5 pounds since her last visit and is still concerned about her weight gain which she feels is because of Abilify. She is not physically active and is not exercising on a regular basis. Denies anhedonia, enjoys spending time with her family. Denies crying spells. Denies any recent memory problems. Denies any falls recently. She continues to be compliant with Cymbalta as well and has not noticed any side effects. She has noticed that she is sleeping about 10-11 hours at night and feels it is excessive. She does not have a fixed routine to get up as she does not have a job or kids at home. She denies having passive or active thoughts of wanting to hurt herself or others.    Social History:   Unemployed, on disability for depression since 2004.  x 2 and  x 1,  to her 2nd  since 2009. She has a daughter from her first marriage and 3 grand kids and they lives in [place], [place]. She lives with her  in a trail in [place]    Substance Use:  Alcohol - Infrequent use  Nicotine - Denies  Illicit drugs - Denies    Past Medication Trials:  Celexa, Zoloft, Prozac, Paxil, Elavil, Lexapro, Effexor, Geodon, Seroquel    Medications:  Current Outpatient Prescriptions   Medication Sig Dispense Refill   •  "aripiprazole (ABILIFY) 2 MG tablet Take 0.5 Tabs by mouth every day. 30 Tab 1   • duloxetine (CYMBALTA) 60 MG Cap DR Particles delayed-release capsule Take 1 Cap by mouth every day. TAKE 1 CAP BY MOUTH EVERY DAY. 30 Cap 2   • fluticasone (FLONASE) 50 MCG/ACT nasal spray Spray 2 Sprays in nose every day. 16 g 5   • triamcinolone acetonide (KENALOG) 0.025 % CREA Apply 1 Application to affected area(s) 2 times a day. 45 g 2     No current facility-administered medications for this visit.        Review Of Systems:    Constitutional - Negative for fatigue  HEENT - Negative for nasal congestion, hearing loss  Respiratory - Negative for shortness of breath, cough  CVS - Negative for chest pain, palpitations  GI - Negative for nausea, vomiting, abdominal pain, diarrhea, constipation  Musculoskeletal - Negative for back pain  Neurological - Negative for headaches  Psychiatric - Negative for anxiety, depression    Physical Examination:  Vital signs: /90   Pulse 68   Ht 1.727 m (5' 8\")   Wt 124.7 kg (275 lb)   BMI 41.81 kg/m²     Musculoskeletal: Normal gait. No abnormal movements.     Mental Status Evaluation:   General: Middle aged white female, dressed in casual attire, good grooming and hygiene, in no apparent distress, calm and cooperative, good eye contact, no psychomotor agitation or retardation  Orientation: Alert and oriented to person, place and time  Recent and remote memory: Grossly intact  Attention span and concentration: Grossly intact  Speech: Spontaneous, normal rate, rhythm and tone  Thought Process: Linear, logical and goal directed  Thought Content: Denies suicidal or homicidal ideations, intent or plan  Perception: Denies auditory or visual hallucinations. No delusions noted  Associations: Intact  Language: Appropriate  Fund of knowledge and vocabulary: Grossly adequate  Mood: \"good\"  Affect: Euthymic, mood congruent  Insight: Good  Judgment: Good      Impression:  1. Major depressive disorder, " recurrent, in full remission    Medical Records/Labs/Diagnostic Tests Reviewed:  [place]  records - no controlled medications in the last 1 year    Plan:  1. Continue Cymbalta 60 mg daily for depression   2. Decrease Abilify to 1 mg daily x 2 months and then discontinue for depression augmentation given stable mood symptoms              - AIMS 0 (9/2016)              - 3 month metabolic monitoring (11/2016): A1c elevated at 5.9, LDL elevated at 121.              - Repeat A1c and lipid profile for yearly metabolic monitoring ordered - due in 11/2017  3. Monitor mood with Abilify dose decrease and discontinuation     Return to clinic in 3 months or sooner if symptoms worsen    The proposed treatment plan was discussed with the patient who was provided the opportunity to ask questions and make suggestions regarding alternative treatment. Patient verbalized understanding and expressed agreement with the plan.     Kimmy Gonzalez M.D.  09/14/17    This note was created using voice recognition software (Dragon). The accuracy of the dictation is limited by the abilities of the software. I have reviewed the note prior to signing, however some errors in grammar and context are still possible. If you have any questions related to this note please do not hesitate to contact our office.

## 2017-12-04 ENCOUNTER — HOSPITAL ENCOUNTER (OUTPATIENT)
Dept: LAB | Facility: MEDICAL CENTER | Age: 57
End: 2017-12-04
Attending: PSYCHIATRY & NEUROLOGY
Payer: COMMERCIAL

## 2017-12-04 DIAGNOSIS — F33.42 MDD (MAJOR DEPRESSIVE DISORDER), RECURRENT, IN FULL REMISSION (HCC): ICD-10-CM

## 2017-12-04 DIAGNOSIS — Z79.899 ENCOUNTER FOR LONG-TERM (CURRENT) USE OF OTHER MEDICATIONS: ICD-10-CM

## 2017-12-04 LAB
CHOLEST SERPL-MCNC: 170 MG/DL (ref 100–199)
EST. AVERAGE GLUCOSE BLD GHB EST-MCNC: 148 MG/DL
HBA1C MFR BLD: 6.8 % (ref 0–5.6)
HDLC SERPL-MCNC: 38 MG/DL
LDLC SERPL CALC-MCNC: 99 MG/DL
TRIGL SERPL-MCNC: 167 MG/DL (ref 0–149)

## 2017-12-04 PROCEDURE — 80061 LIPID PANEL: CPT

## 2017-12-04 PROCEDURE — 83036 HEMOGLOBIN GLYCOSYLATED A1C: CPT

## 2017-12-04 PROCEDURE — 36415 COLL VENOUS BLD VENIPUNCTURE: CPT

## 2017-12-05 ENCOUNTER — TELEPHONE (OUTPATIENT)
Dept: BEHAVIORAL HEALTH | Facility: PHYSICIAN GROUP | Age: 57
End: 2017-12-05

## 2017-12-05 NOTE — TELEPHONE ENCOUNTER
Called to discuss her recent lab results especially elevated A1c. No answer, left message to call clinic again. If she does not call will discuss the results on her follow up visit in about 10 days.

## 2017-12-14 ENCOUNTER — OFFICE VISIT (OUTPATIENT)
Dept: BEHAVIORAL HEALTH | Facility: PHYSICIAN GROUP | Age: 57
End: 2017-12-14
Payer: COMMERCIAL

## 2017-12-14 VITALS
HEART RATE: 70 BPM | HEIGHT: 68 IN | DIASTOLIC BLOOD PRESSURE: 98 MMHG | WEIGHT: 275 LBS | BODY MASS INDEX: 41.68 KG/M2 | SYSTOLIC BLOOD PRESSURE: 160 MMHG

## 2017-12-14 DIAGNOSIS — E11.9 TYPE 2 DIABETES MELLITUS WITHOUT COMPLICATION, WITHOUT LONG-TERM CURRENT USE OF INSULIN (HCC): ICD-10-CM

## 2017-12-14 DIAGNOSIS — F33.42 MDD (MAJOR DEPRESSIVE DISORDER), RECURRENT, IN FULL REMISSION (HCC): ICD-10-CM

## 2017-12-14 PROBLEM — R73.03 PREDIABETES: Status: RESOLVED | Noted: 2017-04-27 | Resolved: 2017-12-14

## 2017-12-14 PROCEDURE — 99214 OFFICE O/P EST MOD 30 MIN: CPT | Performed by: PSYCHIATRY & NEUROLOGY

## 2017-12-14 RX ORDER — DULOXETIN HYDROCHLORIDE 60 MG/1
60 CAPSULE, DELAYED RELEASE ORAL
Qty: 90 CAP | Refills: 1 | Status: SHIPPED | OUTPATIENT
Start: 2017-12-14 | End: 2018-06-18 | Stop reason: SDUPTHER

## 2017-12-14 ASSESSMENT — PATIENT HEALTH QUESTIONNAIRE - PHQ9
2. FEELING DOWN, DEPRESSED, IRRITABLE, OR HOPELESS: 0
1. LITTLE INTEREST OR PLEASURE IN DOING THINGS: 0
SUM OF ALL RESPONSES TO PHQ9 QUESTIONS 1 AND 2: 0

## 2017-12-14 NOTE — PROGRESS NOTES
PSYCHIATRY FOLLOW-UP NOTE      Chief Complaint   Patient presents with   • Follow-Up     depression         History Of Present Illness:  Amy Denson is a 57 y.o. old female with major depressive disorder, prediabetes comes in today for follow up, was last seen 3 months ago. She reports doing good since her last visit here. She was able to taper down her Abilify and has been completely off it for about a month. She has not noticed any decline in her mood without the Abilify. She has been taking Cymbalta daily and denies any side effects. She denies anhedonia, enjoys spending time with her . She went to [place] to spend things giving with her daughter and had a nice time there. Denies crying spells or feeling agitated or irritable. Appetite has been good. She is still not physically active although would like to be. Energy has been good. She continues to sleep about 9-10 hours which she blames on not having a routine in her life anymore. Denies any current memory problems. Denies any recent falls. Denies any recent reckless or impulsive behaviors. Denies having active or passive thoughts of wanting to hurt herself or others.    Social History:   Unemployed, on disability for depression since 2004.  x 2 and  x 1,  to her 2nd  since 2009. She has a daughter from her first marriage and 3 grand kids and they lives in [place], [place]. She lives with her  in a trailer in [place]    Substance Use:  Alcohol - Infrequent use  Nicotine - Denies  Illicit drugs - Denies    Past Medication Trials:  Celexa, Zoloft, Prozac, Paxil, Elavil, Lexapro, Effexor, Geodon, Seroquel, Abilify 5mg (effective)    Medications:  Current Outpatient Prescriptions   Medication Sig Dispense Refill   • duloxetine (CYMBALTA) 60 MG Cap DR Particles delayed-release capsule Take 1 Cap by mouth every day. 90 Cap 1   • fluticasone (FLONASE) 50 MCG/ACT nasal spray Spray 2 Sprays in nose every day. 16  "g 5   • triamcinolone acetonide (KENALOG) 0.025 % CREA Apply 1 Application to affected area(s) 2 times a day. 45 g 2     No current facility-administered medications for this visit.        Review Of Systems:    Constitutional - Negative for fatigue  HEENT - Negative for nasal congestion, hearing loss  Respiratory - Negative for shortness of breath, cough  CVS - Negative for chest pain, palpitations  GI - Negative for nausea, vomiting, abdominal pain, diarrhea, constipation  Musculoskeletal - Negative for back pain  Neurological - Negative for headaches  Psychiatric - Negative for anxiety, depression    Physical Examination:  Vital signs: /98   Pulse 70   Ht 1.727 m (5' 8\")   Wt 124.7 kg (275 lb)   BMI 41.81 kg/m²     Musculoskeletal: Normal gait. No abnormal movements.     Mental Status Evaluation:   General: Middle aged white female, dressed in casual attire, good grooming and hygiene, in no apparent distress, calm and cooperative, good eye contact, no psychomotor agitation or retardation  Orientation: Alert and oriented to person, place and time  Recent and remote memory: Grossly intact  Attention span and concentration: Grossly intact  Speech: Spontaneous, normal rate, rhythm and tone  Thought Process: Linear, logical and goal directed  Thought Content: Denies suicidal or homicidal ideations, intent or plan  Perception: Denies auditory or visual hallucinations. No delusions noted  Associations: Intact  Language: Appropriate  Fund of knowledge and vocabulary: Grossly adequate  Mood: \"good\"  Affect: Euthymic, mood congruent  Insight: Good  Judgment: Good      Impression:  1. Major depressive disorder, recurrent, in full remission  2. Type 2 diabetes mellitus - new problem    Medical Records/Labs/Diagnostic Tests Reviewed:  [place]  records - no prescribed controlled medications found in the last 1 year  A1c elevated at 6.8 (12/2017)  Lipid profile with elevated triglycerides, low HDL (12/2017)    Plan:  1. " Continue Cymbalta 60 mg daily for depression   2. Discussed her elevated A1c and type 2 diabetes mellitus and encouraged her to set up an appointment with her primary care provider to discuss medications for the same. Discussed dietary modifications and importance of physical activity for better glycemic control.    Return to clinic in 6 months or sooner if symptoms worsen    The proposed treatment plan was discussed with the patient who was provided the opportunity to ask questions and make suggestions regarding alternative treatment. Patient verbalized understanding and expressed agreement with the plan.     Kimmy Gonzalez M.D.  12/14/17    This note was created using voice recognition software (Dragon). The accuracy of the dictation is limited by the abilities of the software. I have reviewed the note prior to signing, however some errors in grammar and context are still possible. If you have any questions related to this note please do not hesitate to contact our office.

## 2018-04-12 ENCOUNTER — PATIENT OUTREACH (OUTPATIENT)
Dept: HEALTH INFORMATION MANAGEMENT | Facility: OTHER | Age: 58
End: 2018-04-12

## 2018-04-12 NOTE — PROGRESS NOTES
Outcome: Left Message    Please transfer to Patient Outreach Team at [phone] when patient returns call.    WebIZ Checked & Epic Updated:  yes    HealthConnect Verified: yes    Attempt # 1

## 2018-04-12 NOTE — PROGRESS NOTES
Attempt #:Final  Verify PCP: yes    Communication Preference Obtained: yes       Care Gap Scheduling (Attempt to Schedule EACH Overdue Care Gap!)  Health Maintenance Due   Topic Date Due   • IMM DTaP/Tdap/Td Vaccine (1 - Tdap) 03/19/1981/ Scheduled   • MAMMOGRAM  06/29/2016 / Scheduled       MyChart Activation: already active

## 2018-04-13 ENCOUNTER — NON-PROVIDER VISIT (OUTPATIENT)
Dept: MEDICAL GROUP | Facility: LAB | Age: 58
End: 2018-04-13
Payer: COMMERCIAL

## 2018-04-13 ENCOUNTER — TELEPHONE (OUTPATIENT)
Dept: MEDICAL GROUP | Facility: LAB | Age: 58
End: 2018-04-13

## 2018-04-13 DIAGNOSIS — Z23 NEED FOR TDAP VACCINATION: ICD-10-CM

## 2018-04-13 DIAGNOSIS — Z23 NEED FOR DIPHTHERIA-TETANUS-PERTUSSIS (TDAP) VACCINE, ADULT/ADOLESCENT: ICD-10-CM

## 2018-04-13 PROCEDURE — 90715 TDAP VACCINE 7 YRS/> IM: CPT | Performed by: FAMILY MEDICINE

## 2018-04-13 PROCEDURE — 90471 IMMUNIZATION ADMIN: CPT | Performed by: FAMILY MEDICINE

## 2018-04-13 NOTE — PROGRESS NOTES
"Amy Denson is a 58 y.o. female here for a non-provider visit for:   TDAP    Reason for immunization: Overdue/Provider Recommended  Immunization records indicate need for vaccine: Yes, confirmed with Epic  Minimum interval has been met for this vaccine: Yes  ABN completed: Yes    Order and dose verified by: Amy Fulton  VIS Dated  2-24-15 was given to patient: Yes  All IAC Questionnaire questions were answered \"No.\"    Patient tolerated injection and no adverse effects were observed or reported: Yes    Pt scheduled for next dose in series: Not Indicated    "

## 2018-04-13 NOTE — TELEPHONE ENCOUNTER
Patient is on the MA Schedule today for Tdap vaccine/injection.    SPECIFIC Action To Be Taken: Orders pending, please sign.

## 2018-05-08 ENCOUNTER — HOSPITAL ENCOUNTER (OUTPATIENT)
Dept: RADIOLOGY | Facility: MEDICAL CENTER | Age: 58
End: 2018-05-08
Attending: NURSE PRACTITIONER
Payer: COMMERCIAL

## 2018-05-08 DIAGNOSIS — Z12.31 VISIT FOR SCREENING MAMMOGRAM: ICD-10-CM

## 2018-05-08 PROCEDURE — 77067 SCR MAMMO BI INCL CAD: CPT

## 2018-06-18 ENCOUNTER — OFFICE VISIT (OUTPATIENT)
Dept: BEHAVIORAL HEALTH | Facility: PHYSICIAN GROUP | Age: 58
End: 2018-06-18
Payer: COMMERCIAL

## 2018-06-18 VITALS
DIASTOLIC BLOOD PRESSURE: 94 MMHG | BODY MASS INDEX: 37.89 KG/M2 | HEIGHT: 68 IN | SYSTOLIC BLOOD PRESSURE: 141 MMHG | HEART RATE: 69 BPM | WEIGHT: 250 LBS

## 2018-06-18 DIAGNOSIS — F33.42 MDD (MAJOR DEPRESSIVE DISORDER), RECURRENT, IN FULL REMISSION (HCC): ICD-10-CM

## 2018-06-18 PROCEDURE — 99213 OFFICE O/P EST LOW 20 MIN: CPT | Performed by: PSYCHIATRY & NEUROLOGY

## 2018-06-18 RX ORDER — DULOXETIN HYDROCHLORIDE 60 MG/1
60 CAPSULE, DELAYED RELEASE ORAL
Qty: 90 CAP | Refills: 1 | Status: SHIPPED | OUTPATIENT
Start: 2018-06-18 | End: 2018-12-18 | Stop reason: SDUPTHER

## 2018-06-18 ASSESSMENT — PATIENT HEALTH QUESTIONNAIRE - PHQ9: CLINICAL INTERPRETATION OF PHQ2 SCORE: 0

## 2018-06-18 NOTE — PROGRESS NOTES
PSYCHIATRY FOLLOW-UP NOTE      Chief Complaint   Patient presents with   • Follow-Up     depression         History Of Present Illness:  Amy Denson is a 58 y.o. old female with major depressive disorder comes in today for follow up, was last seen 6 months ago.  She reports doing good in regards to her depression since her last visit here.  She has been compliant with Cymbalta and endorses benefit on her mood symptoms with that.  She has been a lot more physically active and eating better and has lost about 25 pounds in the last 6 months.  She feels that without Abilify it has been easier for her to lose weight.  Denies unprovoked crying spells or feeling agitated or irritable.  Denies anhedonia, continues to enjoy spending time with her  and being outdoors.  She is looking forward to a weeklong camping trip starting today.  Denies any new psychosocial stressors.  Sleep continues to be good and she is not using any over-the-counter sleep aids.  Denies any self-harm behavior are having thoughts of wanting to hurt herself or anybody else.    Social History:   She is on disability for depression since 2004. She has been  x 2 and  x 1. She has been  to her 2nd  since 2009. She has a daughter from her first marriage and 3 grand kids and they lives in [place], [place]. She lives with her  in a [facility] in [place]    Substance Use:  Alcohol - Infrequent use  Nicotine - Denies  Illicit drugs - Denies    Past Medication Trials:  Celexa, Zoloft, Prozac, Paxil, Elavil, Lexapro, Effexor, Geodon, Seroquel, Abilify 5mg (effective)    Medications:  Current Outpatient Prescriptions   Medication Sig Dispense Refill   • DULoxetine (CYMBALTA) 60 MG Cap DR Particles delayed-release capsule Take 1 Cap by mouth every day. 90 Cap 1   • fluticasone (FLONASE) 50 MCG/ACT nasal spray Spray 2 Sprays in nose every day. 16 g 5   • triamcinolone acetonide (KENALOG) 0.025 % CREA Apply 1  "Application to affected area(s) 2 times a day. 45 g 2     No current facility-administered medications for this visit.        Review Of Systems:    Constitutional - Negative for fatigue  HEENT - Negative for nasal congestion, hearing loss  Respiratory - Negative for shortness of breath, cough  CVS - Negative for chest pain, palpitations  GI - Negative for nausea, vomiting, abdominal pain, diarrhea, constipation  Musculoskeletal - Negative for back pain  Neurological - Negative for headaches  Psychiatric - Negative for anxiety, depression, sleep problems    Physical Examination:  Vital signs: /94   Pulse 69   Ht 1.727 m (5' 8\")   Wt 113.4 kg (250 lb)   BMI 38.01 kg/m²     Musculoskeletal: Normal gait. No abnormal movements.     Mental Status Evaluation:   General: Middle aged white female, dressed in casual attire, good grooming and hygiene, in no apparent distress, calm and cooperative, good eye contact, no psychomotor agitation or retardation  Orientation: Alert and oriented to person, place and time  Recent and remote memory: Grossly intact  Attention span and concentration: Grossly intact  Speech: Spontaneous, normal rate, rhythm and tone  Thought Process: Linear, logical and goal directed  Thought Content: Denies suicidal or homicidal ideations, intent or plan  Perception: Denies auditory or visual hallucinations. No delusions noted  Associations: Intact  Language: Appropriate  Fund of knowledge and vocabulary: Grossly adequate  Mood: \"good\"  Affect: Euthymic, mood congruent  Insight: Good  Judgment: Good    Depression screening:  Depression Screen (PHQ-2/PHQ-9) 9/14/2017 12/14/2017 6/18/2018   PHQ-2 Total Score 0 0 -   PHQ-2 Total Score - - -   PHQ-2 Total Score - - 0   PHQ-9 Total Score 1 - -   PHQ-9 Total Score - - -     Interpretation of PHQ-9 Total Score   Score Severity   1-4 No Depression   5-9 Mild Depression   10-14 Moderate Depression   15-19 Moderately Severe Depression   20-27 Severe " Depression      Impression:  1. Major depressive disorder, recurrent, in full remission - stable    Medical Records/Labs/Diagnostic Tests Reviewed:  [place]  records - no prescribed controlled medications found in the last 1 year    Plan:  1. Continue Cymbalta 60 mg daily for depression   2. Advised her to follow up with her PCP regarding elevated A1c     Return to clinic in 6 months or sooner if symptoms worsen    The proposed treatment plan was discussed with the patient who was provided the opportunity to ask questions and make suggestions regarding alternative treatment. Patient verbalized understanding and expressed agreement with the plan.     Kimmy Gonzalez M.D.  06/18/18    This note was created using voice recognition software (Dragon). The accuracy of the dictation is limited by the abilities of the software. I have reviewed the note prior to signing, however some errors in grammar and context are still possible. If you have any questions related to this note please do not hesitate to contact our office.

## 2018-06-20 ENCOUNTER — PATIENT OUTREACH (OUTPATIENT)
Dept: HEALTH INFORMATION MANAGEMENT | Facility: OTHER | Age: 58
End: 2018-06-20

## 2018-07-19 ENCOUNTER — PATIENT MESSAGE (OUTPATIENT)
Dept: MEDICAL GROUP | Facility: LAB | Age: 58
End: 2018-07-19

## 2018-07-19 DIAGNOSIS — E04.1 THYROID NODULE: ICD-10-CM

## 2018-08-01 ENCOUNTER — HOSPITAL ENCOUNTER (OUTPATIENT)
Dept: RADIOLOGY | Facility: MEDICAL CENTER | Age: 58
End: 2018-08-01
Attending: FAMILY MEDICINE
Payer: COMMERCIAL

## 2018-08-01 DIAGNOSIS — E04.1 THYROID NODULE: ICD-10-CM

## 2018-08-01 PROCEDURE — 76536 US EXAM OF HEAD AND NECK: CPT

## 2018-10-08 ENCOUNTER — PATIENT OUTREACH (OUTPATIENT)
Dept: HEALTH INFORMATION MANAGEMENT | Facility: OTHER | Age: 58
End: 2018-10-08

## 2018-10-15 ENCOUNTER — TELEPHONE (OUTPATIENT)
Dept: MEDICAL GROUP | Facility: LAB | Age: 58
End: 2018-10-15

## 2018-10-15 DIAGNOSIS — M76.61 ACHILLES TENDINITIS OF BOTH LOWER EXTREMITIES: ICD-10-CM

## 2018-10-15 DIAGNOSIS — M76.62 ACHILLES TENDINITIS OF BOTH LOWER EXTREMITIES: ICD-10-CM

## 2018-10-15 NOTE — LETTER
October 15, 2018         Patient: Amy Denson   YOB: 1960   Date of Visit: 10/15/2018           To Whom it May Concern:    Amy Denson is being treated by primary care and psychiatry for anxiety and depression.      If you have any questions or concerns, please don't hesitate to call.        Sincerely,           EDUARDA Kovacs.  Electronically Signed

## 2018-10-15 NOTE — TELEPHONE ENCOUNTER
Phone Number Called: [phone] (home)     Message: Amy called she would like a prescription for a toilet lift seat due to her Achilles tendinitis (726.71G) . She thinks that [facility] but she is not sure, she will  the prescription rom the office.    Second.... Her neighbors are complaining about her barking dog that is causing her anxiety to falre up. She is taking the dog to obedience training. She would like a note stating she is under a doctors care for a disorder.    Left Message for patient to call back: yes

## 2018-10-15 NOTE — TELEPHONE ENCOUNTER
Notified patient about letter and that I would have it up front for  or to call with fax # or to print from my chart. Also faxed order [facility] to [facility] with hang [phone]

## 2018-10-19 ENCOUNTER — PATIENT OUTREACH (OUTPATIENT)
Dept: HEALTH INFORMATION MANAGEMENT | Facility: OTHER | Age: 58
End: 2018-10-19

## 2018-10-19 DIAGNOSIS — Z71.89 COMPLEX CARE COORDINATION: ICD-10-CM

## 2018-10-24 ENCOUNTER — PATIENT OUTREACH (OUTPATIENT)
Dept: HEALTH INFORMATION MANAGEMENT | Facility: OTHER | Age: 58
End: 2018-10-24

## 2018-10-24 ENCOUNTER — PATIENT MESSAGE (OUTPATIENT)
Dept: HEALTH INFORMATION MANAGEMENT | Facility: OTHER | Age: 58
End: 2018-10-24

## 2018-10-24 NOTE — PROGRESS NOTES
Pt self referral to Community Care Management team which as been sent to  care coordination with report of needing DME possibly higher toilet chair/lift and mobility devices. Outreach call to pt to introduce self, discuss role of social service care coordination and determine additional information regarding pt's identified needs. There was no answer and VM was left requesting call back.    Plan:  · [employer] message also sent to pt and LSW will attempt to reach pt at later time/date via phone if LSW does not hear back.   · Additional community resource information for CARE Chest mailed to pt to review.

## 2018-10-25 ENCOUNTER — PATIENT OUTREACH (OUTPATIENT)
Dept: HEALTH INFORMATION MANAGEMENT | Facility: OTHER | Age: 58
End: 2018-10-25

## 2018-10-25 NOTE — PROGRESS NOTES
LSW received incoming call from pt and Speek message back. She reported she is looking for a specific toilet seat riser that will fit her elongated toilet. Pt reported she was told to look on [employer] and found one she likes but is unsure if it will fit or how to order. LSW discussed with pt that she was likely told to look on [employer] as typically insurance does not cover the cost of a seat riser and it could be a private pay cost to her. Discussed if she had contacted her insurance provider to determine if this DME is a covered benefit. Pt reported she had contacted the insurance provider but per report it appears it was more related to what DME companies pt could obtain a toilet seat riser vs if insurance covers the cost. Pt voiced she felt frustrated with the process and tired from the run around.     Reviewed options such as [facility] [facility], obtaining from DME company and or ordering via [employer] at a private pay cost. Pt would like to determine if the toilet seat riser is covered benefit with her insurance and is agreeable to LSW conducting additional research and following up with her next week.     Plan:  · LSW will follow up with pt next week after reaching out to her insurance provider to determine if insurance will cover a toilet seat riser.

## 2018-10-29 ENCOUNTER — PATIENT OUTREACH (OUTPATIENT)
Dept: HEALTH INFORMATION MANAGEMENT | Facility: OTHER | Age: 58
End: 2018-10-29

## 2018-10-29 NOTE — PROGRESS NOTES
"LSW received return e-mail from pt's insurance [facility] stating, \"this item would not be covered by [facility]\" referring to the toilet seat riser. Outreach call to pt to follow up and provide update. Pt did not answer, LSW left VM and requested pt to follow up with LSW.    Plan:  · LSW follow up with pt at later time/date, if LSW does not hear back from pt.   "

## 2018-10-29 NOTE — PROGRESS NOTES
Outreach call placed to pt's insurance [facility] to determine if a toilet seat riser is a covered benefit with pt's plan. LSW was on hold for over a hour with no answer. Unable to remain on hold, line was disconnected. LSW reached out to [facility] Customer Service via email to determine if equipment is covered under plan--pending response back.    Plan:  · LSW will continue to assist pt and follow up with pt once additional information is determined from pt's health insurance regarding coverage.

## 2018-10-30 ENCOUNTER — PATIENT OUTREACH (OUTPATIENT)
Dept: HEALTH INFORMATION MANAGEMENT | Facility: OTHER | Age: 58
End: 2018-10-30

## 2018-10-30 NOTE — PROGRESS NOTES
Follow up call to pt to provide update on information given by insurance company that toilet seat riser is not covered. Discussed options with pt in moving forward such as paying privately for toilet seat riser discussing items can be obtained at most pharmacies, department stores such as [employer] or through [employer] as pt reported she had previously found a toliet seat riser she liked via [employer]. Further dicussed other alternative if pt is unable to afford private pay cost would be to apply for CARE Chest program. Explained to pt that agency may not have exact model or style wanted as agency is based off donation and she may have to follow up with agency as new donations come in. She voiced understanding. At this time she reported she is likely to pay privately but will look into the option. LSW encouraged pt to follow up with LSW if there are additional needs or if pt is wanting to pursue CARE Chest. Pt agreeable.     Plan:  · LSW will follow up with pt regarding status of being able to obtain toilet seat riser.

## 2018-11-02 DIAGNOSIS — L30.9 ECZEMA, UNSPECIFIED TYPE: ICD-10-CM

## 2018-11-02 RX ORDER — TRIAMCINOLONE ACETONIDE 0.25 MG/G
1 CREAM TOPICAL 2 TIMES DAILY
Qty: 45 G | Refills: 2 | Status: ON HOLD | OUTPATIENT
Start: 2018-11-02 | End: 2023-04-05

## 2018-11-02 NOTE — TELEPHONE ENCOUNTER
Was the patient seen in the last year in this department? No   LOV--5/9/17  Does patient have an active prescription for medications requested? No     Received Request Via: Patient

## 2018-11-02 NOTE — TELEPHONE ENCOUNTER
----- Message from Amy Denson sent at 11/1/2018  8:31 PM PDT -----  Regarding: Prescription Question  Contact: [phone]  Emperatriz,    I tried here on My Chart to refill prescript (which shows 2 remaining refills) for: KENALOG triamcinolone acetonide .  Do I just call my pharmacy ([facility] ) to see if I can refill (I do not have the original container from when it was prescribed in 2014 ).    An immediate response, is greatly appreciated.    Thank you,    Amy Denson

## 2018-11-30 ENCOUNTER — TELEPHONE (OUTPATIENT)
Dept: MEDICAL GROUP | Facility: LAB | Age: 58
End: 2018-11-30

## 2018-12-01 NOTE — TELEPHONE ENCOUNTER
Phone Number Called: [phone]    Message: left message to see what office notes were needed for them to do colonoscopy .  Left Message for patient to call back: yes

## 2018-12-03 ENCOUNTER — PATIENT OUTREACH (OUTPATIENT)
Dept: HEALTH INFORMATION MANAGEMENT | Facility: OTHER | Age: 58
End: 2018-12-03

## 2018-12-03 NOTE — PROGRESS NOTES
Outreach call to follow up with pt and determine if she was able to obtain a toilet seat riser. Pt did not answer. LSW left VM for pt requesting call back.     Plan:  · LSW will attempt to reach pt at later time/date, if LSW does not hear back.

## 2018-12-18 ENCOUNTER — OFFICE VISIT (OUTPATIENT)
Dept: BEHAVIORAL HEALTH | Facility: CLINIC | Age: 58
End: 2018-12-18
Payer: COMMERCIAL

## 2018-12-18 ENCOUNTER — PATIENT OUTREACH (OUTPATIENT)
Dept: HEALTH INFORMATION MANAGEMENT | Facility: OTHER | Age: 58
End: 2018-12-18

## 2018-12-18 VITALS
WEIGHT: 238 LBS | SYSTOLIC BLOOD PRESSURE: 146 MMHG | HEIGHT: 68 IN | HEART RATE: 70 BPM | DIASTOLIC BLOOD PRESSURE: 93 MMHG | BODY MASS INDEX: 36.07 KG/M2

## 2018-12-18 DIAGNOSIS — F33.42 MDD (MAJOR DEPRESSIVE DISORDER), RECURRENT, IN FULL REMISSION (HCC): ICD-10-CM

## 2018-12-18 PROCEDURE — 99213 OFFICE O/P EST LOW 20 MIN: CPT | Performed by: PSYCHIATRY & NEUROLOGY

## 2018-12-18 RX ORDER — DULOXETIN HYDROCHLORIDE 60 MG/1
60 CAPSULE, DELAYED RELEASE ORAL
Qty: 90 CAP | Refills: 1 | Status: SHIPPED | OUTPATIENT
Start: 2018-12-18 | End: 2019-04-18 | Stop reason: SDUPTHER

## 2018-12-18 ASSESSMENT — PATIENT HEALTH QUESTIONNAIRE - PHQ9: CLINICAL INTERPRETATION OF PHQ2 SCORE: 0

## 2018-12-18 NOTE — PROGRESS NOTES
MSW received return call from pt reporting she did get a toilet seat riser which she ordered off of [employer]. She reported satisfaction with product and denied any additional social needs at this time.     Plan:  · MSW will graduate pt from social work care coordination services at this time; goal met. Pt has contact information to call MSW if additional assistance is needed.

## 2018-12-18 NOTE — PROGRESS NOTES
PSYCHIATRY FOLLOW-UP NOTE      Chief Complaint   Patient presents with   • Follow-Up     depression         History Of Present Illness:  Amy Denson is a 58 y.o. old female with major depressive disorder comes in today for follow up, was last seen 6 months ago.  She reports struggling with depression during September when she lost somebody really close to her.  She had a hard time for a few weeks.  She and her  were traveling in the trailer to [facility] and she was not getting a good enough sleep which caused some irritability had affected their marriage as well.  She feels that her  should be in individual therapy and they should try couples counseling as well but her  is not open to that idea.  She is trying really hard to take care of herself and has been compliant with Cymbalta.  She does feel that Cymbalta helps her depression and does not let her moods dip down significantly.  She is sleeping and eating good.  She continues to enjoy spending time with her daughter and grandkids.  She and her  are doing better in their marriage now and she is hoping that this continues.  She is still working on weight loss and has lost about 12 pounds in the last 6-month and is feeling good about it.  She denies having thoughts of wanting to hurt herself or others.    Social History:   She has been  and  x1.  She has been  to her second  since 2009.  She has a daughter from her first marriage and 3 grandkids and they live in [place].  She has been on disability for depression since 2004.  She lives with her  in a mobile home in [place].    Substance Use:  Alcohol - Infrequent use  Nicotine - Denies  Illicit drugs - Denies    Past Medication Trials:  Celexa, Zoloft, Prozac, Paxil, Elavil, Lexapro, Effexor, Geodon, Seroquel, Abilify 5mg (effective, s/e - weight gain)    Medications:  Current Outpatient Prescriptions   Medication Sig Dispense  "Refill   • DULoxetine (CYMBALTA) 60 MG Cap DR Particles delayed-release capsule Take 1 Cap by mouth every day. 90 Cap 1   • triamcinolone acetonide (KENALOG) 0.025 % Cream Apply 1 Application to affected area(s) 2 times a day. (Patient not taking: Reported on 12/18/2018) 45 g 2   • NON SPECIFIED Toilet seat lift.   Dx:  Achilles tendonitis 1 Each 0   • fluticasone (FLONASE) 50 MCG/ACT nasal spray Spray 2 Sprays in nose every day. (Patient not taking: Reported on 12/18/2018) 16 g 5     No current facility-administered medications for this visit.        Review Of Systems:    Constitutional - Negative for fatigue  HEENT - Negative for nasal congestion, hearing loss  Respiratory - Negative for shortness of breath, cough  CVS - Negative for chest pain, palpitations  GI - Negative for nausea, vomiting, abdominal pain, diarrhea, constipation  Musculoskeletal - Negative for back pain  Neurological - Negative for headaches  Psychiatric - Negative for anxiety, sleep problems, depression    Physical Examination:  Vital signs: /93   Pulse 70   Ht 1.727 m (5' 8\")   Wt 108 kg (238 lb)   BMI 36.19 kg/m²     Musculoskeletal: Normal gait. No abnormal movements.     Mental Status Evaluation:   General: Middle aged white female, dressed in casual attire, good grooming and hygiene, in no apparent distress, calm and cooperative, good eye contact, no psychomotor agitation or retardation  Orientation: Alert and oriented to person, place and time  Recent and remote memory: Grossly intact  Attention span and concentration: Grossly intact  Speech: Spontaneous, normal rate, rhythm and tone  Thought Process: Linear, logical and goal directed  Thought Content: Denies suicidal or homicidal ideations, intent or plan  Perception: Denies auditory or visual hallucinations. No delusions noted  Associations: Intact  Language: Appropriate  Fund of knowledge and vocabulary: Grossly adequate  Mood: \"am better now\"  Affect: Euthymic, mood " congruent  Insight: Good  Judgment: Good    Depression screening:  Depression Screen (PHQ-2/PHQ-9) 12/14/2017 6/18/2018 12/18/2018   PHQ-2 Total Score 0 - -   PHQ-2 Total Score - - -   PHQ-2 Total Score - 0 0   PHQ-9 Total Score - - -   PHQ-9 Total Score - - -     Interpretation of PHQ-9 Total Score   Score Severity   1-4 No Depression   5-9 Mild Depression   10-14 Moderate Depression   15-19 Moderately Severe Depression   20-27 Severe Depression      Impression:  1. Major depressive disorder, recurrent, in full remission - stable    Medical Records/Labs/Diagnostic Tests Reviewed:  [place]  records - no prescribed controlled medications found in the last 1 year    Plan:  1. Continue Cymbalta 60 mg daily for depression      Return to clinic in 4 months or sooner if symptoms worsen    The proposed treatment plan was discussed with the patient who was provided the opportunity to ask questions and make suggestions regarding alternative treatment. Patient verbalized understanding and expressed agreement with the plan.     Kimmy Gonzalez M.D.  12/18/18    This note was created using voice recognition software (Dragon). The accuracy of the dictation is limited by the abilities of the software. I have reviewed the note prior to signing, however some errors in grammar and context are still possible. If you have any questions related to this note please do not hesitate to contact our office.

## 2019-03-25 DIAGNOSIS — R73.01 IMPAIRED FASTING GLUCOSE: ICD-10-CM

## 2019-03-25 DIAGNOSIS — Z00.00 PREVENTATIVE HEALTH CARE: ICD-10-CM

## 2019-04-10 ENCOUNTER — HOSPITAL ENCOUNTER (OUTPATIENT)
Dept: LAB | Facility: MEDICAL CENTER | Age: 59
End: 2019-04-10
Attending: NURSE PRACTITIONER
Payer: COMMERCIAL

## 2019-04-10 DIAGNOSIS — Z00.00 PREVENTATIVE HEALTH CARE: ICD-10-CM

## 2019-04-10 DIAGNOSIS — R73.01 IMPAIRED FASTING GLUCOSE: ICD-10-CM

## 2019-04-10 LAB
ALBUMIN SERPL BCP-MCNC: 3.8 G/DL (ref 3.2–4.9)
ALBUMIN/GLOB SERPL: 1.1 G/DL
ALP SERPL-CCNC: 58 U/L (ref 30–99)
ALT SERPL-CCNC: 15 U/L (ref 2–50)
ANION GAP SERPL CALC-SCNC: 10 MMOL/L (ref 0–11.9)
AST SERPL-CCNC: 15 U/L (ref 12–45)
BILIRUB SERPL-MCNC: 0.5 MG/DL (ref 0.1–1.5)
BUN SERPL-MCNC: 11 MG/DL (ref 8–22)
CALCIUM SERPL-MCNC: 8.8 MG/DL (ref 8.5–10.5)
CHLORIDE SERPL-SCNC: 106 MMOL/L (ref 96–112)
CHOLEST SERPL-MCNC: 185 MG/DL (ref 100–199)
CO2 SERPL-SCNC: 24 MMOL/L (ref 20–33)
CREAT SERPL-MCNC: 0.76 MG/DL (ref 0.5–1.4)
CREAT UR-MCNC: 149.1 MG/DL
EST. AVERAGE GLUCOSE BLD GHB EST-MCNC: 137 MG/DL
FASTING STATUS PATIENT QL REPORTED: NORMAL
GLOBULIN SER CALC-MCNC: 3.5 G/DL (ref 1.9–3.5)
GLUCOSE SERPL-MCNC: 107 MG/DL (ref 65–99)
HBA1C MFR BLD: 6.4 % (ref 0–5.6)
HDLC SERPL-MCNC: 43 MG/DL
LDLC SERPL CALC-MCNC: 116 MG/DL
MICROALBUMIN UR-MCNC: 0.7 MG/DL
MICROALBUMIN/CREAT UR: 5 MG/G (ref 0–30)
POTASSIUM SERPL-SCNC: 4 MMOL/L (ref 3.6–5.5)
PROT SERPL-MCNC: 7.3 G/DL (ref 6–8.2)
SODIUM SERPL-SCNC: 140 MMOL/L (ref 135–145)
TRIGL SERPL-MCNC: 132 MG/DL (ref 0–149)

## 2019-04-10 PROCEDURE — 83036 HEMOGLOBIN GLYCOSYLATED A1C: CPT

## 2019-04-10 PROCEDURE — 82043 UR ALBUMIN QUANTITATIVE: CPT

## 2019-04-10 PROCEDURE — 36415 COLL VENOUS BLD VENIPUNCTURE: CPT

## 2019-04-10 PROCEDURE — 80061 LIPID PANEL: CPT

## 2019-04-10 PROCEDURE — 82570 ASSAY OF URINE CREATININE: CPT

## 2019-04-10 PROCEDURE — 80053 COMPREHEN METABOLIC PANEL: CPT

## 2019-04-18 ENCOUNTER — OFFICE VISIT (OUTPATIENT)
Dept: BEHAVIORAL HEALTH | Facility: CLINIC | Age: 59
End: 2019-04-18
Payer: COMMERCIAL

## 2019-04-18 VITALS
BODY MASS INDEX: 37.74 KG/M2 | SYSTOLIC BLOOD PRESSURE: 137 MMHG | HEIGHT: 68 IN | WEIGHT: 249 LBS | DIASTOLIC BLOOD PRESSURE: 81 MMHG | HEART RATE: 72 BPM

## 2019-04-18 DIAGNOSIS — F33.42 MDD (MAJOR DEPRESSIVE DISORDER), RECURRENT, IN FULL REMISSION (HCC): ICD-10-CM

## 2019-04-18 PROCEDURE — 99213 OFFICE O/P EST LOW 20 MIN: CPT | Performed by: PSYCHIATRY & NEUROLOGY

## 2019-04-18 RX ORDER — DULOXETIN HYDROCHLORIDE 60 MG/1
60 CAPSULE, DELAYED RELEASE ORAL
Qty: 90 CAP | Refills: 1 | Status: SHIPPED | OUTPATIENT
Start: 2019-04-18 | End: 2019-10-14 | Stop reason: SDUPTHER

## 2019-04-18 NOTE — PROGRESS NOTES
PSYCHIATRY FOLLOW-UP NOTE      Chief Complaint   Patient presents with   • Follow-Up     depression         History Of Present Illness:  Amy Denson is a 59 y.o. old female with major depressive disorder comes in today for follow up, was last seen 4 months ago.  She has been doing good in regards to her depression since her last appointment with me.  She has been compliant with Cymbalta and endorses benefit.  She denies any side effects from it.  She denies, continues to enjoy spending time with her family.  She denies any current problems with her sleep and appetite.  She has been eating ice cream on a daily basis and has gained about 10 pounds in the last few months.  She wants to lose weight before she takes a trip to [place] for her anniversary.  She denies any current psychosocial stressors.  She denies any recent reckless or impulsive behaviors.  She denies having thoughts of wanting to hurt herself or others.    Social History:   She has been  x2 and  x1.  She has been  to her second  since 2009.  She has a daughter from her first marriage and 3 grandkids and they live in [place].  She has been on disability for depression since 2004.  She lives with her  in a mobile home in [place].    Substance Use:  Alcohol - Infrequent use  Nicotine - Denies  Illicit drugs - Denies    Past Medication Trials:  Celexa, Zoloft, Prozac, Paxil, Elavil, Lexapro, Effexor, Geodon, Seroquel, Abilify 5mg (effective, s/e - weight gain)    Medications:  Current Outpatient Prescriptions   Medication Sig Dispense Refill   • DULoxetine (CYMBALTA) 60 MG Cap DR Particles delayed-release capsule Take 1 Cap by mouth every day. 90 Cap 1   • triamcinolone acetonide (KENALOG) 0.025 % Cream Apply 1 Application to affected area(s) 2 times a day. (Patient not taking: Reported on 12/18/2018) 45 g 2   • NON SPECIFIED Toilet seat lift.   Dx:  Achilles tendonitis 1 Each 0   • fluticasone (FLONASE) 50  "MCG/ACT nasal spray Spray 2 Sprays in nose every day. (Patient not taking: Reported on 12/18/2018) 16 g 5     No current facility-administered medications for this visit.        Review Of Systems:    Constitutional - Negative for fatigue  HEENT - Negative for nasal congestion, hearing loss  Respiratory - Negative for shortness of breath, cough  CVS - Negative for chest pain, palpitations  GI - Negative for nausea, vomiting, abdominal pain, diarrhea, constipation  Musculoskeletal - Negative for back pain  Neurological - Negative for headaches  Psychiatric - Negative for anxiety, sleep problems, depression    Physical Examination:  Vital signs: /81   Pulse 72   Ht 1.727 m (5' 8\")   Wt 112.9 kg (249 lb)   BMI 37.86 kg/m²     Musculoskeletal: Normal gait. No abnormal movements.     Mental Status Evaluation:   General: Middle aged white female, dressed in casual attire, good grooming and hygiene, in no apparent distress, calm and cooperative, good eye contact, no psychomotor agitation or retardation  Orientation: Alert and oriented to person, place and time  Recent and remote memory: Grossly intact  Attention span and concentration: Grossly intact  Speech: Spontaneous, normal rate, rhythm and tone  Thought Process: Linear, logical and goal directed  Thought Content: Denies suicidal or homicidal ideations, intent or plan  Perception: Denies auditory or visual hallucinations. No delusions noted  Associations: Intact  Language: Appropriate  Fund of knowledge and vocabulary: Grossly adequate  Mood: \"am doing well\"  Affect: Euthymic, mood congruent  Insight: Good  Judgment: Good    Depression screening:  Depression Screen (PHQ-2/PHQ-9) 12/14/2017 6/18/2018 12/18/2018   PHQ-2 Total Score 0 - -   PHQ-2 Total Score - - -   PHQ-2 Total Score - 0 0   PHQ-9 Total Score - - -   PHQ-9 Total Score - - -     Interpretation of PHQ-9 Total Score   Score Severity   1-4 No Depression   5-9 Mild Depression   10-14 Moderate " Depression   15-19 Moderately Severe Depression   20-27 Severe Depression    Medical Records/Labs/Diagnostic Tests Reviewed:  [place] - no prescribed controlled medications found in the last 1 year      Impression:  1. Major depressive disorder, recurrent, in full remission - stable    Plan:  1. Continue Cymbalta 60 mg daily for depression      Return to clinic in 6 months or sooner if symptoms worsen    The proposed treatment plan was discussed with the patient who was provided the opportunity to ask questions and make suggestions regarding alternative treatment. Patient verbalized understanding and expressed agreement with the plan.     Kimmy Gonzalez M.D.  04/18/19    This note was created using voice recognition software (Dragon). The accuracy of the dictation is limited by the abilities of the software. I have reviewed the note prior to signing, however some errors in grammar and context are still possible. If you have any questions related to this note please do not hesitate to contact our office.

## 2019-10-14 ENCOUNTER — OFFICE VISIT (OUTPATIENT)
Dept: BEHAVIORAL HEALTH | Facility: CLINIC | Age: 59
End: 2019-10-14
Payer: COMMERCIAL

## 2019-10-14 VITALS
HEART RATE: 83 BPM | HEIGHT: 68 IN | WEIGHT: 246 LBS | DIASTOLIC BLOOD PRESSURE: 89 MMHG | SYSTOLIC BLOOD PRESSURE: 150 MMHG | BODY MASS INDEX: 37.28 KG/M2

## 2019-10-14 DIAGNOSIS — F33.42 MDD (MAJOR DEPRESSIVE DISORDER), RECURRENT, IN FULL REMISSION (HCC): ICD-10-CM

## 2019-10-14 PROCEDURE — 99213 OFFICE O/P EST LOW 20 MIN: CPT | Performed by: PSYCHIATRY & NEUROLOGY

## 2019-10-14 RX ORDER — DULOXETIN HYDROCHLORIDE 60 MG/1
60 CAPSULE, DELAYED RELEASE ORAL
Qty: 90 CAP | Refills: 1 | Status: SHIPPED | OUTPATIENT
Start: 2019-10-14 | End: 2020-03-02 | Stop reason: SDUPTHER

## 2019-10-14 ASSESSMENT — PATIENT HEALTH QUESTIONNAIRE - PHQ9: CLINICAL INTERPRETATION OF PHQ2 SCORE: 0

## 2019-10-14 NOTE — PROGRESS NOTES
PSYCHIATRY FOLLOW-UP NOTE      Chief Complaint   Patient presents with   • Follow-Up     depression         History Of Present Illness:  Amy Denson is a 59 y.o. old female with major depressive disorder comes in today for follow up, was last seen 6 months ago.  She has been doing good in regards to her depression since her last visit with me.  She has having some on and off situational anxiety with her 's ex-wife who appears to be getting information about the finances and is threatening to take him back to court.  She is trying to get him as much information as she can to help her.  She has also signed up for classes though [facility] [employer] [facility] [employer] to learn new things and meet new people.  She denies any problems with her sleep or appetite.  She continues to be compliant with Cymbalta and endorses benefit.  She denies unprovoked crying spells or struggling with irritability or agitation.  She denies having thoughts of wanting to hurt herself or others.    Social History:   She has been  x2 and  x1.  She has been  to her second  since 2009.  She has a daughter from her first marriage and 3 grandkids and they live in [place].  She has been on disability for depression since 2004.  She lives with her  in a mobile home in [place].    Substance Use:  Alcohol - Infrequent use  Nicotine - Denies  Illicit drugs - Denies    Past Medication Trials:  Celexa, Zoloft, Prozac, Paxil, Elavil, Lexapro, Effexor, Geodon, Seroquel, Abilify 5mg (effective, s/e - weight gain)    Medications:  Current Outpatient Medications   Medication Sig Dispense Refill   • DULoxetine (CYMBALTA) 60 MG Cap DR Particles delayed-release capsule Take 1 Cap by mouth every day. 90 Cap 1   • triamcinolone acetonide (KENALOG) 0.025 % Cream Apply 1 Application to affected area(s) 2 times a day. 45 g 2   • NON SPECIFIED Toilet seat lift.   Dx:  Achilles tendonitis 1 Each 0     No current  "facility-administered medications for this visit.        Review Of Systems:    Constitutional - Negative for fatigue  HEENT - Negative for nasal congestion, hearing loss  Respiratory - Negative for shortness of breath, cough  CVS - Negative for chest pain, palpitations  GI - Negative for nausea, vomiting, abdominal pain, diarrhea, constipation  Musculoskeletal - Negative for back pain  Neurological - Negative for headaches  Psychiatric - Negative for sleep problems, depression. Positive for situational anxiety    Physical Examination:  Vital signs: /89   Pulse 83   Ht 1.727 m (5' 8\")   Wt 111.6 kg (246 lb)   BMI 37.40 kg/m²     Musculoskeletal: Normal gait. No abnormal movements.     Mental Status Evaluation:   General: Middle aged white female, dressed in casual attire, good grooming and hygiene, in no apparent distress, calm and cooperative, good eye contact, no psychomotor agitation or retardation  Orientation: Alert and oriented to person, place and time  Recent and remote memory: Grossly intact  Attention span and concentration: Grossly intact  Speech: Spontaneous, normal rate, rhythm and tone  Thought Process: Linear, logical and goal directed  Thought Content: Denies suicidal or homicidal ideations, intent or plan  Perception: Denies auditory or visual hallucinations. No delusions noted  Associations: Intact  Language: Appropriate  Fund of knowledge and vocabulary: Grossly adequate  Mood: \"doing good\"  Affect: Euthymic, mood congruent  Insight: Good  Judgment: Good    Depression screening:  Depression Screen (PHQ-2/PHQ-9) 6/18/2018 12/18/2018 10/14/2019   PHQ-2 Total Score - - -   PHQ-2 Total Score - - -   PHQ-2 Total Score 0 0 0   PHQ-9 Total Score - - -   PHQ-9 Total Score - - -     Interpretation of PHQ-9 Total Score   Score Severity   1-4 No Depression   5-9 Mild Depression   10-14 Moderate Depression   15-19 Moderately Severe Depression   20-27 Severe Depression    Medical " Records/Labs/Diagnostic Tests Reviewed:  [place] - no prescribed controlled medications found in the last 1 year      Impression:  1. Major depressive disorder, recurrent, in full remission - stable    Plan:  1. Continue Cymbalta 60 mg daily for depression      Return to clinic in 6 months or sooner if symptoms worsen    The proposed treatment plan was discussed with the patient who was provided the opportunity to ask questions and make suggestions regarding alternative treatment. Patient verbalized understanding and expressed agreement with the plan.     Kimmy Gonzalez M.D.  10/14/19    This note was created using voice recognition software (Dragon). The accuracy of the dictation is limited by the abilities of the software. I have reviewed the note prior to signing, however some errors in grammar and context are still possible. If you have any questions related to this note please do not hesitate to contact our office.

## 2020-03-02 RX ORDER — DULOXETIN HYDROCHLORIDE 60 MG/1
60 CAPSULE, DELAYED RELEASE ORAL
Qty: 90 CAP | Refills: 0 | Status: SHIPPED | OUTPATIENT
Start: 2020-03-02 | End: 2020-04-15 | Stop reason: SDUPTHER

## 2020-03-02 NOTE — TELEPHONE ENCOUNTER
Received request via: Patient    Was the patient seen in the last year in this department? Yes    Does the patient have an active prescription (recently filled or refills available) for medication(s) requested? No     DISPLAY PLAN FREE TEXT

## 2020-03-05 ENCOUNTER — HOSPITAL ENCOUNTER (OUTPATIENT)
Dept: RADIOLOGY | Facility: MEDICAL CENTER | Age: 60
End: 2020-03-05
Attending: NURSE PRACTITIONER
Payer: MEDICARE

## 2020-03-05 DIAGNOSIS — Z12.31 VISIT FOR SCREENING MAMMOGRAM: ICD-10-CM

## 2020-03-05 PROCEDURE — 77067 SCR MAMMO BI INCL CAD: CPT | Mod: 50

## 2020-04-15 ENCOUNTER — TELEMEDICINE (OUTPATIENT)
Dept: BEHAVIORAL HEALTH | Facility: CLINIC | Age: 60
End: 2020-04-15
Payer: MEDICARE

## 2020-04-15 VITALS — HEIGHT: 68 IN | BODY MASS INDEX: 36.37 KG/M2 | WEIGHT: 240 LBS

## 2020-04-15 DIAGNOSIS — F33.42 MDD (MAJOR DEPRESSIVE DISORDER), RECURRENT, IN FULL REMISSION (HCC): ICD-10-CM

## 2020-04-15 PROCEDURE — 99442 PR PHYSICIAN TELEPHONE EVALUATION 11-20 MIN: CPT | Mod: CR | Performed by: PSYCHIATRY & NEUROLOGY

## 2020-04-15 RX ORDER — DULOXETIN HYDROCHLORIDE 60 MG/1
60 CAPSULE, DELAYED RELEASE ORAL
Qty: 90 CAP | Refills: 1 | Status: SHIPPED | OUTPATIENT
Start: 2020-04-15 | End: 2020-10-16 | Stop reason: SDUPTHER

## 2020-04-15 ASSESSMENT — PATIENT HEALTH QUESTIONNAIRE - PHQ9: CLINICAL INTERPRETATION OF PHQ2 SCORE: 0

## 2020-04-15 NOTE — PROGRESS NOTES
Telephone Appointment Visit   As a means of avoiding spread of COVID-19, this visit is being conducted by telephone. This telephone visit was initiated by the patient and they verbally consented.    Time at start of call: 10:00 AM    Reason for Call:  Medication Follow-up and Symptom Follow-up    Patient Comments / History:   She has been doing all right in regards to her depression lately.  She was in [place] for a while to help her daughter who delivered her fourth grandchild.  She is staying indoors as much as possible and is taking care of her health.  She is compliant with Cymbalta and endorses benefit.  She denies any new psychosocial stressors.  She denies any problems with her sleep or appetite.  She denies improved crying spells or struggling with anhedonia.  She denies having thoughts of wanting to hurt herself or others.    Labs / Images Reviewed   [place]  - no controlled medication prescriptions found in the last 1 year    Assessment and Plan:     1. MDD (major depressive disorder), recurrent, in full remission (HCC)  -Continue Cymbalta 60 mg daily for depression    Follow-up: Return in about 6 months (around 10/15/2020) for 30 minutes.    Time at end of call: 10:18 AM  Total Time Spent: 11-20 minutes    Kimmy Gonzalez M.D.

## 2020-10-16 ENCOUNTER — TELEMEDICINE (OUTPATIENT)
Dept: BEHAVIORAL HEALTH | Facility: CLINIC | Age: 60
End: 2020-10-16
Payer: MEDICARE

## 2020-10-16 VITALS — BODY MASS INDEX: 37.89 KG/M2 | WEIGHT: 250 LBS | HEIGHT: 68 IN

## 2020-10-16 DIAGNOSIS — F33.42 MDD (MAJOR DEPRESSIVE DISORDER), RECURRENT, IN FULL REMISSION (HCC): ICD-10-CM

## 2020-10-16 PROCEDURE — 99213 OFFICE O/P EST LOW 20 MIN: CPT | Mod: 95,CR | Performed by: PSYCHIATRY & NEUROLOGY

## 2020-10-16 RX ORDER — DULOXETIN HYDROCHLORIDE 60 MG/1
60 CAPSULE, DELAYED RELEASE ORAL
Qty: 90 CAP | Refills: 1 | Status: SHIPPED | OUTPATIENT
Start: 2020-10-16 | End: 2021-03-29 | Stop reason: SDUPTHER

## 2020-10-16 NOTE — PROGRESS NOTES
PSYCHIATRY VIRTUAL VISIT FOLLOW-UP NOTE      Chief Complaint   Patient presents with   • Follow-Up     depression       This evaluation was conducted via Zoom using secure and encrypted videoconferencing technology. The patient was in a private location in the [facility].    The patient's identity was confirmed and verbal consent was obtained for this virtual visit.    History Of Present Illness:  Amy Denson is a 60 y.o. female with major depressive disorder comes in today for follow up, was last seen 6 months ago.  She has been doing all right in regards to her depression since her last visit with me.  She is having some stress in her marriage and is looking at her legal options.  She feels that she is handling the situation in a reasonable way.  She just visited her daughter in [place] and saw for 4 grandkids and she enjoyed the visit.  She denies unprovoked crying spells or feeling irritable.  She has been doing good in regards to her sleep and appetite.  She continues to be compliant with Cymbalta and is endorsing benefit.  She denies having thoughts of wanting to hurt herself or others.    Social History:   She is , has been  x2 and  x1,1  daughter from her first marriage and 4 grandkids and they live in [facility] on disability for depression since 2004, lives with her  in a mobile home in [place].    Substance Use:  Alcohol - Infrequent use  Nicotine - Denies  Cannabis - CBD balm for ankles  Illicit drugs - Denies    Past Medication Trials:  Celexa, Zoloft, Prozac, Paxil, Elavil, Lexapro, Effexor, Geodon, Seroquel, Abilify 5mg (effective, s/e - weight gain)    Medications:  Current Outpatient Medications   Medication Sig Dispense Refill   • DULoxetine (CYMBALTA) 60 MG Cap DR Particles delayed-release capsule Take 1 Cap by mouth every day. 90 Cap 1   • triamcinolone acetonide (KENALOG) 0.025 % Cream Apply 1 Application to affected area(s) 2 times a day. 45 g 2   • NON  "SPECIFIED Toilet seat lift.   Dx:  Achilles tendonitis 1 Each 0     No current facility-administered medications for this visit.        Review Of Systems:    Constitutional - Negative for fatigue  HEENT - Negative for nasal congestion, hearing loss  Respiratory - Negative for shortness of breath, cough  CVS - Negative for chest pain, palpitations  GI - Negative for nausea, vomiting, abdominal pain, diarrhea, constipation  Musculoskeletal - Negative for back pain  Neurological - Negative for headaches  Psychiatric - Negative for sleep problems, depression, anxiety    Physical Examination:  Vital signs: Ht 1.727 m (5' 8\")   Wt 113.4 kg (250 lb)   BMI 38.01 kg/m²     Musculoskeletal: No abnormal movements.     Mental Status Evaluation:   General: Middle aged white female, dressed in casual attire, good grooming and hygiene, in no apparent distress, calm and cooperative, good eye contact, no psychomotor agitation or retardation  Orientation: Alert and oriented to person, place and time  Recent and remote memory: Grossly intact  Attention span and concentration: Grossly intact  Speech: Spontaneous, normal rate, rhythm and tone  Thought Process: Linear, logical and goal directed  Thought Content: Denies suicidal or homicidal ideations, intent or plan  Perception: Denies auditory or visual hallucinations. No delusions noted  Associations: Intact  Language: Appropriate  Fund of knowledge and vocabulary: Grossly adequate  Mood: \"alright\"  Affect: Euthymic, mood congruent  Insight: Good  Judgment: Good    Depression screening:  Depression Screen (PHQ-2/PHQ-9) 12/18/2018 10/14/2019 4/15/2020   PHQ-2 Total Score - - -   PHQ-2 Total Score - - -   PHQ-2 Total Score 0 0 0   PHQ-9 Total Score - - -   PHQ-9 Total Score - - -     Interpretation of PHQ-9 Total Score   Score Severity   1-4 No Depression   5-9 Mild Depression   10-14 Moderate Depression   15-19 Moderately Severe Depression   20-27 Severe Depression    Medical " Records/Labs/Diagnostic Tests Reviewed:  [place]  records - no prescribed controlled medications found in the last 1 year      Impression:  1.  Major depressive disorder, recurrent, in full remission - stable    Plan:  1.  Continue Cymbalta 60 mg daily for depression      Return to clinic in 6 months or sooner if symptoms worsen    The proposed treatment plan was discussed with the patient who was provided the opportunity to ask questions and make suggestions regarding alternative treatment. Patient verbalized understanding and expressed agreement with the plan.     Kimmy Gonzalez M.D.  10/16/20    This note was created using voice recognition software (Dragon). The accuracy of the dictation is limited by the abilities of the software. I have reviewed the note prior to signing, however some errors in grammar and context are still possible. If you have any questions related to this note please do not hesitate to contact our office.

## 2021-03-15 DIAGNOSIS — Z23 NEED FOR VACCINATION: ICD-10-CM

## 2021-03-29 RX ORDER — DULOXETIN HYDROCHLORIDE 60 MG/1
60 CAPSULE, DELAYED RELEASE ORAL
Qty: 30 CAPSULE | Refills: 0 | Status: SHIPPED | OUTPATIENT
Start: 2021-03-29 | End: 2021-08-19

## 2021-03-29 NOTE — TELEPHONE ENCOUNTER
----- Message from Sonal Henderson sent at 3/28/2021  8:22 AM PDT -----  Regarding: FW: Medication  Contact: [phone]    ----- Message -----  From: Amy Denson  Sent: 3/25/2021   3:10 PM PDT  To: Gerry Garcia  Subject: Medication                                       Topic: Bill/Statement    This message is for Emperatriz Kay.  I need to get a refill for my medication.  My medication is Duloxetine HCL DR 60 MG CAP.  I will no longer be seeing Dr. Gonzalez.  (my medication is ordered every 3 months).  Thank you for your assistance.    Sincerely,  Amy Denson

## 2021-03-29 NOTE — TELEPHONE ENCOUNTER
Received request via: Patient    Was the patient seen in the last year in this department? No     Last visit was 2017    Does the patient have an active prescription (recently filled or refills available) for medication(s) requested? No

## 2021-04-05 ENCOUNTER — HOSPITAL ENCOUNTER (OUTPATIENT)
Dept: RADIOLOGY | Facility: MEDICAL CENTER | Age: 61
End: 2021-04-05
Attending: NURSE PRACTITIONER
Payer: MEDICARE

## 2021-04-05 DIAGNOSIS — Z12.31 ENCOUNTER FOR MAMMOGRAM TO ESTABLISH BASELINE MAMMOGRAM: ICD-10-CM

## 2021-04-05 PROCEDURE — 77063 BREAST TOMOSYNTHESIS BI: CPT

## 2021-04-27 ENCOUNTER — IMMUNIZATION (OUTPATIENT)
Dept: FAMILY PLANNING/WOMEN'S HEALTH CLINIC | Facility: IMMUNIZATION CENTER | Age: 61
End: 2021-04-27
Attending: INTERNAL MEDICINE
Payer: MEDICARE

## 2021-04-27 DIAGNOSIS — Z23 NEED FOR VACCINATION: ICD-10-CM

## 2021-04-27 DIAGNOSIS — Z23 ENCOUNTER FOR VACCINATION: Primary | ICD-10-CM

## 2021-04-27 PROCEDURE — 91300 PFIZER SARS-COV-2 VACCINE: CPT | Performed by: INTERNAL MEDICINE

## 2021-04-27 PROCEDURE — 0001A PFIZER SARS-COV-2 VACCINE: CPT | Performed by: INTERNAL MEDICINE

## 2021-05-21 ENCOUNTER — IMMUNIZATION (OUTPATIENT)
Dept: FAMILY PLANNING/WOMEN'S HEALTH CLINIC | Facility: IMMUNIZATION CENTER | Age: 61
End: 2021-05-21
Payer: MEDICARE

## 2021-05-21 DIAGNOSIS — Z23 ENCOUNTER FOR VACCINATION: Primary | ICD-10-CM

## 2021-05-21 PROCEDURE — 91300 PFIZER SARS-COV-2 VACCINE: CPT | Performed by: INTERNAL MEDICINE

## 2021-05-21 PROCEDURE — 0002A PFIZER SARS-COV-2 VACCINE: CPT | Performed by: INTERNAL MEDICINE

## 2021-08-19 RX ORDER — DULOXETIN HYDROCHLORIDE 60 MG/1
60 CAPSULE, DELAYED RELEASE ORAL
Qty: 90 CAPSULE | Refills: 1 | Status: SHIPPED | OUTPATIENT
Start: 2021-08-19 | End: 2022-02-22

## 2021-08-25 ENCOUNTER — TELEPHONE (OUTPATIENT)
Dept: MEDICAL GROUP | Facility: LAB | Age: 61
End: 2021-08-25

## 2021-08-25 NOTE — TELEPHONE ENCOUNTER
NEW PATIENT VISIT PRE-VISIT PLANNING    1.  E[employer] Patient is checked in Patient Demographics?Yes    2.  Immunizations were updated in Epic using Reconcile Outside Information activity? Yes         3.  Is this appointment scheduled as a Hospital Follow-Up? No    4.  Patient is due for the following Health Maintenance Topics:   Health Maintenance Due   Topic Date Due   • Annual Wellness Visit  Never done   • HEPATITIS C SCREENING  Never done   • IMM ZOSTER VACCINES (1 of 2) Never done   • PAP SMEAR  06/02/2018     5.  Reviewed/Updated the following with patient:       •   Preferred Pharmacy? Yes       •   Preferred Lab? Yes       •   Preferred Communication? Yes       •   Allergies? Yes       •   Medications? YES. Was Abstract Encounter opened and chart updated? YES  6.  Updated Care Team?       •   DME Company (gait device, O2, CPAP, etc.) YES       •   Other Specialists (eye doctor, derm, GYN, cardiology, endo, etc): YES    7.  AHA (Puls[employer]) form printed for Provider? Yes

## 2021-09-01 ENCOUNTER — OFFICE VISIT (OUTPATIENT)
Dept: MEDICAL GROUP | Facility: LAB | Age: 61
End: 2021-09-01
Payer: MEDICARE

## 2021-09-01 VITALS
TEMPERATURE: 97.4 F | BODY MASS INDEX: 39.86 KG/M2 | DIASTOLIC BLOOD PRESSURE: 70 MMHG | WEIGHT: 263 LBS | HEART RATE: 80 BPM | HEIGHT: 68 IN | OXYGEN SATURATION: 94 % | RESPIRATION RATE: 20 BRPM | SYSTOLIC BLOOD PRESSURE: 130 MMHG

## 2021-09-01 DIAGNOSIS — M25.572 CHRONIC PAIN OF BOTH ANKLES: ICD-10-CM

## 2021-09-01 DIAGNOSIS — E78.5 HYPERLIPIDEMIA, UNSPECIFIED HYPERLIPIDEMIA TYPE: ICD-10-CM

## 2021-09-01 DIAGNOSIS — M25.571 CHRONIC PAIN OF BOTH ANKLES: ICD-10-CM

## 2021-09-01 DIAGNOSIS — R73.03 PREDIABETES: ICD-10-CM

## 2021-09-01 DIAGNOSIS — N93.9 ABNORMAL VAGINAL BLEEDING: ICD-10-CM

## 2021-09-01 DIAGNOSIS — F33.42 MDD (MAJOR DEPRESSIVE DISORDER), RECURRENT, IN FULL REMISSION (HCC): ICD-10-CM

## 2021-09-01 DIAGNOSIS — E66.9 OBESITY (BMI 30-39.9): ICD-10-CM

## 2021-09-01 DIAGNOSIS — E04.1 THYROID NODULE: ICD-10-CM

## 2021-09-01 DIAGNOSIS — G89.29 CHRONIC PAIN OF BOTH ANKLES: ICD-10-CM

## 2021-09-01 PROCEDURE — 99214 OFFICE O/P EST MOD 30 MIN: CPT | Performed by: NURSE PRACTITIONER

## 2021-09-01 ASSESSMENT — PATIENT HEALTH QUESTIONNAIRE - PHQ9
6. FEELING BAD ABOUT YOURSELF - OR THAT YOU ARE A FAILURE OR HAVE LET YOURSELF OR YOUR FAMILY DOWN: NOT AL ALL
2. FEELING DOWN, DEPRESSED, IRRITABLE, OR HOPELESS: NOT AT ALL
9. THOUGHTS THAT YOU WOULD BE BETTER OFF DEAD, OR OF HURTING YOURSELF: NOT AT ALL
8. MOVING OR SPEAKING SO SLOWLY THAT OTHER PEOPLE COULD HAVE NOTICED. OR THE OPPOSITE, BEING SO FIGETY OR RESTLESS THAT YOU HAVE BEEN MOVING AROUND A LOT MORE THAN USUAL: NOT AT ALL
SUM OF ALL RESPONSES TO PHQ QUESTIONS 1-9: 2
3. TROUBLE FALLING OR STAYING ASLEEP OR SLEEPING TOO MUCH: SEVERAL DAYS
4. FEELING TIRED OR HAVING LITTLE ENERGY: SEVERAL DAYS
1. LITTLE INTEREST OR PLEASURE IN DOING THINGS: NOT AT ALL
7. TROUBLE CONCENTRATING ON THINGS, SUCH AS READING THE NEWSPAPER OR WATCHING TELEVISION: NOT AT ALL
SUM OF ALL RESPONSES TO PHQ9 QUESTIONS 1 AND 2: 0
5. POOR APPETITE OR OVEREATING: NOT AT ALL

## 2021-09-01 NOTE — PROGRESS NOTES
"CC  F/u on chronic issues    HPI  Amy is a 61-year-old established female, last seen 2017, here to reestablish care and follow-up on chronic issues.  Overall tells me her depression is very well controlled with duloxetine and she stopped seeing psychiatry last year during the pandemic.      Her main concern is vaginal spotting and the prison of her gynecologist, Dr. Boogie.  Stopped menses about age 57.  Has been spotting a few x per years intermittently and saw Dr. Boogie for this about 2 years ago- fibroid was removed.  Denies pelvic pain but has periodic cramping.  Would like to know if she should continue seeing the gynecologist that took over his practice, Dr. Owens.  She is due for a Pap smear.  She is uncertain regarding her last ultrasound.  She is not bleeding today.    Prediabetes: Last A1c was 2019 at 6.4..  She has lost 2 pounds since her visit in 2017.  She is not exercising regularly but has a stationary bike.  Struggles with portion control and a diabetic diet.    Hyperlipidemia: Borderline.  LDL cholesterol  in 2019 was 116 with an HDL of 43.  She does not smoke and has never had a heart attack or stroke.    Past medical, surgical, family, and social history is reviewed and updated in Epic chart by me today.   Medications and allergies reviewed and updated in Epic chart by me today.     ROS:   As documented in history of present illness above    Exam:  /70 (BP Location: Left arm, Patient Position: Sitting, BP Cuff Size: Large adult)   Pulse 80   Temp 36.3 °C (97.4 °F)   Resp 20   Ht 1.727 m (5' 8\")   Wt 119 kg (263 lb)   SpO2 94%   Constitutional: Alert, no distress, plus 3 vital signs  Skin:  Warm, dry.  Dry/cracked skin to both heels with no erythema.  Eye: Equal, round and reactive, conjunctiva clear  Neck: Trachea midline.  No masses felt.  Known thyroid nodule with negative biopsy 7 years ago.  Respiratory: Unlabored respiration, lungs clear to auscultation, no wheezes, no " rhonchi  Cardiovascular: Normal rate and rhythm.  Psych: Alert, pleasant, well-groomed, normal affect    Assessment / Plan / Medical Decision makin. Prediabetes  -Discussed the importance of restarting an exercise program, following a diabetic diet and following up every 6 months for an A1c.  Recommend an A1c with a full lab panel in the next week.  - HEMOGLOBIN A1C; Future    2. Hyperlipidemia, unspecified hyperlipidemia type  -Recommend updated lipid panel.  Discussed a high-fiber diet and exercise.  - Comp Metabolic Panel; Future  - CBC WITH DIFFERENTIAL; Future  - Lipid Profile; Future  - TSH; Future    3. Thyroid nodule - neg biopsy 2014.  US 2018.    -Negative biopsy thousand 14 and ultrasound 2018.  Patient is very concerned about cost of ultrasound and will check with [facility] regarding rechecking her thyroid nodule, notify me.    4. Obesity (BMI 30-39.9)  -We discussed this.  She is working on weight loss through restarting her exercise bike portion control.    5. MDD (major depressive disorder), recurrent, in full remission (HCC)  -In remission.  Doing well on Cymbalta.  Follow-up at least every year.    6. Abnormal vaginal bleeding  -Previously checked out by Dr. Boogie who has retired.  She plans on establishing with Dr. Owens, his replacement, ASAP.    In terms of her dry, cracked heels, we discussed Aquaphor twice daily on a regular basis.    F/u 6 months for AWV

## 2021-09-01 NOTE — PROGRESS NOTES
Annual Health Assessment Questions:    1.  Are you currently engaging in any exercise or physical activity? No    2.  How would you describe your mood or emotional well-being today? good    3.  Have you had any falls in the last year? No    4.  Have you noticed any problems with your balance or had difficulty walking? No    5.  In the last six months have you experienced any leakage of urine? Yes    6. DPA/Advanced Directive: Patient does not have an Advanced Directive.  A packet and workshop information was given on Advanced Directives.

## 2021-11-04 ENCOUNTER — HOSPITAL ENCOUNTER (OUTPATIENT)
Dept: LAB | Facility: MEDICAL CENTER | Age: 61
End: 2021-11-04
Attending: NURSE PRACTITIONER
Payer: MEDICARE

## 2021-11-04 DIAGNOSIS — E78.5 HYPERLIPIDEMIA, UNSPECIFIED HYPERLIPIDEMIA TYPE: ICD-10-CM

## 2021-11-04 DIAGNOSIS — R73.03 PREDIABETES: ICD-10-CM

## 2021-11-04 LAB
ALBUMIN SERPL BCP-MCNC: 4.1 G/DL (ref 3.2–4.9)
ALBUMIN/GLOB SERPL: 1.2 G/DL
ALP SERPL-CCNC: 79 U/L (ref 30–99)
ALT SERPL-CCNC: 20 U/L (ref 2–50)
ANION GAP SERPL CALC-SCNC: 11 MMOL/L (ref 7–16)
AST SERPL-CCNC: 18 U/L (ref 12–45)
BASOPHILS # BLD AUTO: 1.2 % (ref 0–1.8)
BASOPHILS # BLD: 0.09 K/UL (ref 0–0.12)
BILIRUB SERPL-MCNC: 0.5 MG/DL (ref 0.1–1.5)
BUN SERPL-MCNC: 12 MG/DL (ref 8–22)
CALCIUM SERPL-MCNC: 9.6 MG/DL (ref 8.5–10.5)
CHLORIDE SERPL-SCNC: 104 MMOL/L (ref 96–112)
CHOLEST SERPL-MCNC: 167 MG/DL (ref 100–199)
CO2 SERPL-SCNC: 25 MMOL/L (ref 20–33)
CREAT SERPL-MCNC: 0.86 MG/DL (ref 0.5–1.4)
EOSINOPHIL # BLD AUTO: 0.28 K/UL (ref 0–0.51)
EOSINOPHIL NFR BLD: 3.9 % (ref 0–6.9)
ERYTHROCYTE [DISTWIDTH] IN BLOOD BY AUTOMATED COUNT: 43.1 FL (ref 35.9–50)
EST. AVERAGE GLUCOSE BLD GHB EST-MCNC: 131 MG/DL
FASTING STATUS PATIENT QL REPORTED: NORMAL
GLOBULIN SER CALC-MCNC: 3.5 G/DL (ref 1.9–3.5)
GLUCOSE SERPL-MCNC: 135 MG/DL (ref 65–99)
HBA1C MFR BLD: 6.2 % (ref 4–5.6)
HCT VFR BLD AUTO: 49 % (ref 37–47)
HDLC SERPL-MCNC: 44 MG/DL
HGB BLD-MCNC: 15.9 G/DL (ref 12–16)
IMM GRANULOCYTES # BLD AUTO: 0.02 K/UL (ref 0–0.11)
IMM GRANULOCYTES NFR BLD AUTO: 0.3 % (ref 0–0.9)
LDLC SERPL CALC-MCNC: 103 MG/DL
LYMPHOCYTES # BLD AUTO: 2.64 K/UL (ref 1–4.8)
LYMPHOCYTES NFR BLD: 36.4 % (ref 22–41)
MCH RBC QN AUTO: 30.5 PG (ref 27–33)
MCHC RBC AUTO-ENTMCNC: 32.4 G/DL (ref 33.6–35)
MCV RBC AUTO: 94 FL (ref 81.4–97.8)
MONOCYTES # BLD AUTO: 0.55 K/UL (ref 0–0.85)
MONOCYTES NFR BLD AUTO: 7.6 % (ref 0–13.4)
NEUTROPHILS # BLD AUTO: 3.68 K/UL (ref 2–7.15)
NEUTROPHILS NFR BLD: 50.6 % (ref 44–72)
NRBC # BLD AUTO: 0 K/UL
NRBC BLD-RTO: 0 /100 WBC
PLATELET # BLD AUTO: 262 K/UL (ref 164–446)
PMV BLD AUTO: 9.3 FL (ref 9–12.9)
POTASSIUM SERPL-SCNC: 4.3 MMOL/L (ref 3.6–5.5)
PROT SERPL-MCNC: 7.6 G/DL (ref 6–8.2)
RBC # BLD AUTO: 5.21 M/UL (ref 4.2–5.4)
SODIUM SERPL-SCNC: 140 MMOL/L (ref 135–145)
TRIGL SERPL-MCNC: 100 MG/DL (ref 0–149)
TSH SERPL DL<=0.005 MIU/L-ACNC: 1.82 UIU/ML (ref 0.38–5.33)
WBC # BLD AUTO: 7.3 K/UL (ref 4.8–10.8)

## 2021-11-04 PROCEDURE — 84443 ASSAY THYROID STIM HORMONE: CPT

## 2021-11-04 PROCEDURE — 80061 LIPID PANEL: CPT

## 2021-11-04 PROCEDURE — 80053 COMPREHEN METABOLIC PANEL: CPT

## 2021-11-04 PROCEDURE — 83036 HEMOGLOBIN GLYCOSYLATED A1C: CPT

## 2021-11-04 PROCEDURE — 36415 COLL VENOUS BLD VENIPUNCTURE: CPT

## 2021-11-04 PROCEDURE — 85025 COMPLETE CBC W/AUTO DIFF WBC: CPT

## 2021-12-03 ENCOUNTER — HOSPITAL ENCOUNTER (OUTPATIENT)
Dept: RADIOLOGY | Facility: MEDICAL CENTER | Age: 61
End: 2021-12-03
Attending: OBSTETRICS & GYNECOLOGY
Payer: MEDICARE

## 2021-12-03 DIAGNOSIS — N95.0 POSTMENOPAUSAL BLEEDING: ICD-10-CM

## 2021-12-03 PROCEDURE — 76830 TRANSVAGINAL US NON-OB: CPT

## 2022-01-07 ENCOUNTER — HOSPITAL ENCOUNTER (OUTPATIENT)
Facility: MEDICAL CENTER | Age: 62
End: 2022-01-07
Attending: OBSTETRICS & GYNECOLOGY | Admitting: OBSTETRICS & GYNECOLOGY
Payer: MEDICARE

## 2022-01-11 ENCOUNTER — PATIENT MESSAGE (OUTPATIENT)
Dept: MEDICAL GROUP | Facility: LAB | Age: 62
End: 2022-01-11

## 2022-01-11 DIAGNOSIS — R05.9 COUGH: ICD-10-CM

## 2022-01-20 ENCOUNTER — HOSPITAL ENCOUNTER (OUTPATIENT)
Dept: RADIOLOGY | Facility: MEDICAL CENTER | Age: 62
End: 2022-01-20
Attending: NURSE PRACTITIONER
Payer: MEDICARE

## 2022-01-20 DIAGNOSIS — R05.9 COUGH: ICD-10-CM

## 2022-01-20 PROCEDURE — 71046 X-RAY EXAM CHEST 2 VIEWS: CPT

## 2022-01-25 NOTE — OR NURSING
This RN spoke with patient as I was told that patient had some covid questions.  Patient reports to me that she started with sore throat, congestion, fatigue, cough, and fever on 1/7/2022.  Patient tested positive for covid on 1/12/2022 through the On license of [facility] with [facility]. Patient will provide us with hard copy of result.  Patient reports to me that her symptoms resolved on 1/22/2022.  Patient currently scheduled for surgery on 2/1/2022 with Dr. Fine.  This RN called and spoke with Dr. Fine's surgery scheduler, Lexis, and reported the above information.  Lexis to contact patient to discuss further.

## 2022-01-26 ENCOUNTER — APPOINTMENT (OUTPATIENT)
Dept: ADMISSIONS | Facility: MEDICAL CENTER | Age: 62
End: 2022-01-26
Payer: MEDICARE

## 2022-02-08 ENCOUNTER — PATIENT MESSAGE (OUTPATIENT)
Dept: HEALTH INFORMATION MANAGEMENT | Facility: OTHER | Age: 62
End: 2022-02-08
Payer: MEDICARE

## 2022-02-14 ENCOUNTER — PRE-ADMISSION TESTING (OUTPATIENT)
Dept: ADMISSIONS | Facility: MEDICAL CENTER | Age: 62
End: 2022-02-14
Attending: OBSTETRICS & GYNECOLOGY
Payer: MEDICARE

## 2022-02-14 DIAGNOSIS — Z01.812 PRE-OPERATIVE LABORATORY EXAMINATION: ICD-10-CM

## 2022-02-14 LAB
ANION GAP SERPL CALC-SCNC: 12 MMOL/L (ref 7–16)
APPEARANCE UR: ABNORMAL
BACTERIA #/AREA URNS HPF: ABNORMAL /HPF
BASOPHILS # BLD AUTO: 1.1 % (ref 0–1.8)
BASOPHILS # BLD: 0.08 K/UL (ref 0–0.12)
BILIRUB UR QL STRIP.AUTO: NEGATIVE
BUN SERPL-MCNC: 13 MG/DL (ref 8–22)
CALCIUM SERPL-MCNC: 9.5 MG/DL (ref 8.5–10.5)
CHLORIDE SERPL-SCNC: 105 MMOL/L (ref 96–112)
CO2 SERPL-SCNC: 21 MMOL/L (ref 20–33)
COLOR UR: YELLOW
CREAT SERPL-MCNC: 0.76 MG/DL (ref 0.5–1.4)
EOSINOPHIL # BLD AUTO: 0.66 K/UL (ref 0–0.51)
EOSINOPHIL NFR BLD: 8.7 % (ref 0–6.9)
EPI CELLS #/AREA URNS HPF: ABNORMAL /HPF
ERYTHROCYTE [DISTWIDTH] IN BLOOD BY AUTOMATED COUNT: 47.9 FL (ref 35.9–50)
GLUCOSE SERPL-MCNC: 137 MG/DL (ref 65–99)
GLUCOSE UR STRIP.AUTO-MCNC: NEGATIVE MG/DL
HCT VFR BLD AUTO: 47.9 % (ref 37–47)
HGB BLD-MCNC: 15.7 G/DL (ref 12–16)
HYALINE CASTS #/AREA URNS LPF: ABNORMAL /LPF
IMM GRANULOCYTES # BLD AUTO: 0.03 K/UL (ref 0–0.11)
IMM GRANULOCYTES NFR BLD AUTO: 0.4 % (ref 0–0.9)
KETONES UR STRIP.AUTO-MCNC: NEGATIVE MG/DL
LEUKOCYTE ESTERASE UR QL STRIP.AUTO: ABNORMAL
LYMPHOCYTES # BLD AUTO: 2.57 K/UL (ref 1–4.8)
LYMPHOCYTES NFR BLD: 33.8 % (ref 22–41)
MCH RBC QN AUTO: 31 PG (ref 27–33)
MCHC RBC AUTO-ENTMCNC: 32.8 G/DL (ref 33.6–35)
MCV RBC AUTO: 94.5 FL (ref 81.4–97.8)
MICRO URNS: ABNORMAL
MONOCYTES # BLD AUTO: 0.55 K/UL (ref 0–0.85)
MONOCYTES NFR BLD AUTO: 7.2 % (ref 0–13.4)
NEUTROPHILS # BLD AUTO: 3.71 K/UL (ref 2–7.15)
NEUTROPHILS NFR BLD: 48.8 % (ref 44–72)
NITRITE UR QL STRIP.AUTO: NEGATIVE
NRBC # BLD AUTO: 0 K/UL
NRBC BLD-RTO: 0 /100 WBC
PH UR STRIP.AUTO: 5.5 [PH] (ref 5–8)
PLATELET # BLD AUTO: 205 K/UL (ref 164–446)
PMV BLD AUTO: 8.9 FL (ref 9–12.9)
POTASSIUM SERPL-SCNC: 4.5 MMOL/L (ref 3.6–5.5)
PROT UR QL STRIP: NEGATIVE MG/DL
RBC # BLD AUTO: 5.07 M/UL (ref 4.2–5.4)
RBC # URNS HPF: ABNORMAL /HPF
RBC UR QL AUTO: ABNORMAL
SODIUM SERPL-SCNC: 138 MMOL/L (ref 135–145)
SP GR UR STRIP.AUTO: 1.02
UROBILINOGEN UR STRIP.AUTO-MCNC: 0.2 MG/DL
WBC # BLD AUTO: 7.6 K/UL (ref 4.8–10.8)
WBC #/AREA URNS HPF: ABNORMAL /HPF

## 2022-02-14 PROCEDURE — 80048 BASIC METABOLIC PNL TOTAL CA: CPT

## 2022-02-14 PROCEDURE — 81001 URINALYSIS AUTO W/SCOPE: CPT

## 2022-02-14 PROCEDURE — 87086 URINE CULTURE/COLONY COUNT: CPT

## 2022-02-14 PROCEDURE — 85025 COMPLETE CBC W/AUTO DIFF WBC: CPT

## 2022-02-14 ASSESSMENT — FIBROSIS 4 INDEX: FIB4 SCORE: 0.91

## 2022-02-15 ENCOUNTER — TELEPHONE (OUTPATIENT)
Dept: MEDICAL GROUP | Facility: LAB | Age: 62
End: 2022-02-15

## 2022-02-15 SDOH — ECONOMIC STABILITY: HOUSING INSECURITY
IN THE LAST 12 MONTHS, WAS THERE A TIME WHEN YOU DID NOT HAVE A STEADY PLACE TO SLEEP OR SLEPT IN A SHELTER (INCLUDING NOW)?: NO

## 2022-02-15 SDOH — ECONOMIC STABILITY: FOOD INSECURITY: WITHIN THE PAST 12 MONTHS, THE FOOD YOU BOUGHT JUST DIDN'T LAST AND YOU DIDN'T HAVE MONEY TO GET MORE.: NEVER TRUE

## 2022-02-15 SDOH — ECONOMIC STABILITY: INCOME INSECURITY: HOW HARD IS IT FOR YOU TO PAY FOR THE VERY BASICS LIKE FOOD, HOUSING, MEDICAL CARE, AND HEATING?: NOT HARD AT ALL

## 2022-02-15 SDOH — ECONOMIC STABILITY: TRANSPORTATION INSECURITY
IN THE PAST 12 MONTHS, HAS THE LACK OF TRANSPORTATION KEPT YOU FROM MEDICAL APPOINTMENTS OR FROM GETTING MEDICATIONS?: NO

## 2022-02-15 SDOH — HEALTH STABILITY: PHYSICAL HEALTH: ON AVERAGE, HOW MANY DAYS PER WEEK DO YOU ENGAGE IN MODERATE TO STRENUOUS EXERCISE (LIKE A BRISK WALK)?: 1 DAY

## 2022-02-15 SDOH — ECONOMIC STABILITY: FOOD INSECURITY: WITHIN THE PAST 12 MONTHS, YOU WORRIED THAT YOUR FOOD WOULD RUN OUT BEFORE YOU GOT MONEY TO BUY MORE.: NEVER TRUE

## 2022-02-15 SDOH — HEALTH STABILITY: PHYSICAL HEALTH: ON AVERAGE, HOW MANY MINUTES DO YOU ENGAGE IN EXERCISE AT THIS LEVEL?: 20 MIN

## 2022-02-15 SDOH — ECONOMIC STABILITY: INCOME INSECURITY: IN THE LAST 12 MONTHS, WAS THERE A TIME WHEN YOU WERE NOT ABLE TO PAY THE MORTGAGE OR RENT ON TIME?: NO

## 2022-02-15 SDOH — ECONOMIC STABILITY: TRANSPORTATION INSECURITY
IN THE PAST 12 MONTHS, HAS LACK OF TRANSPORTATION KEPT YOU FROM MEETINGS, WORK, OR FROM GETTING THINGS NEEDED FOR DAILY LIVING?: NO

## 2022-02-15 SDOH — ECONOMIC STABILITY: TRANSPORTATION INSECURITY
IN THE PAST 12 MONTHS, HAS LACK OF RELIABLE TRANSPORTATION KEPT YOU FROM MEDICAL APPOINTMENTS, MEETINGS, WORK OR FROM GETTING THINGS NEEDED FOR DAILY LIVING?: NO

## 2022-02-15 SDOH — ECONOMIC STABILITY: HOUSING INSECURITY: IN THE LAST 12 MONTHS, HOW MANY PLACES HAVE YOU LIVED?: 1

## 2022-02-15 SDOH — HEALTH STABILITY: MENTAL HEALTH
STRESS IS WHEN SOMEONE FEELS TENSE, NERVOUS, ANXIOUS, OR CAN'T SLEEP AT NIGHT BECAUSE THEIR MIND IS TROUBLED. HOW STRESSED ARE YOU?: NOT AT ALL

## 2022-02-15 ASSESSMENT — LIFESTYLE VARIABLES
HOW MANY STANDARD DRINKS CONTAINING ALCOHOL DO YOU HAVE ON A TYPICAL DAY: 1 OR 2
HOW OFTEN DO YOU HAVE SIX OR MORE DRINKS ON ONE OCCASION: NEVER
HOW OFTEN DO YOU HAVE A DRINK CONTAINING ALCOHOL: MONTHLY OR LESS

## 2022-02-15 ASSESSMENT — SOCIAL DETERMINANTS OF HEALTH (SDOH)
HOW OFTEN DO YOU ATTEND CHURCH OR RELIGIOUS SERVICES?: 1 TO 4 TIMES PER YEAR
HOW OFTEN DO YOU GET TOGETHER WITH FRIENDS OR RELATIVES?: ONCE A WEEK
DO YOU BELONG TO ANY CLUBS OR ORGANIZATIONS SUCH AS CHURCH GROUPS UNIONS, FRATERNAL OR ATHLETIC GROUPS, OR SCHOOL GROUPS?: NO
DO YOU BELONG TO ANY CLUBS OR ORGANIZATIONS SUCH AS CHURCH GROUPS UNIONS, FRATERNAL OR ATHLETIC GROUPS, OR SCHOOL GROUPS?: NO
WITHIN THE PAST 12 MONTHS, YOU WORRIED THAT YOUR FOOD WOULD RUN OUT BEFORE YOU GOT THE MONEY TO BUY MORE: NEVER TRUE
IN A TYPICAL WEEK, HOW MANY TIMES DO YOU TALK ON THE PHONE WITH FAMILY, FRIENDS, OR NEIGHBORS?: TWICE A WEEK
HOW OFTEN DO YOU HAVE A DRINK CONTAINING ALCOHOL: MONTHLY OR LESS
HOW MANY DRINKS CONTAINING ALCOHOL DO YOU HAVE ON A TYPICAL DAY WHEN YOU ARE DRINKING: 1 OR 2
HOW OFTEN DO YOU ATTENT MEETINGS OF THE CLUB OR ORGANIZATION YOU BELONG TO?: NEVER
HOW OFTEN DO YOU ATTEND CHURCH OR RELIGIOUS SERVICES?: 1 TO 4 TIMES PER YEAR
HOW OFTEN DO YOU HAVE SIX OR MORE DRINKS ON ONE OCCASION: NEVER
HOW OFTEN DO YOU ATTENT MEETINGS OF THE CLUB OR ORGANIZATION YOU BELONG TO?: NEVER
HOW OFTEN DO YOU GET TOGETHER WITH FRIENDS OR RELATIVES?: ONCE A WEEK
HOW HARD IS IT FOR YOU TO PAY FOR THE VERY BASICS LIKE FOOD, HOUSING, MEDICAL CARE, AND HEATING?: NOT HARD AT ALL
IN A TYPICAL WEEK, HOW MANY TIMES DO YOU TALK ON THE PHONE WITH FAMILY, FRIENDS, OR NEIGHBORS?: TWICE A WEEK

## 2022-02-15 NOTE — TELEPHONE ENCOUNTER
ANNUAL WELLNESS VISIT PRE-VISIT PLANNING    1.  Reviewed notes from the last office visit: Yes    2.  If any orders were ordered or intended to be done prior to visit (i.e. 6 mos follow-up), do we have results/consult notes or has patient scheduled?        •  Labs - Labs ordered, completed on 11/4/2021,2/14/22 and results are in chart.  Note: If patient appointment is for lab review and patient did not complete labs, check with provider if OK to reschedule patient until labs completed.       •  Imaging - Imaging ordered, completed and results are in chart.       •  Referrals - No referrals were ordered at last office visit.    3.  Immunizations were updated in Epic using Reconcile Outside Information activity? Yes    4.  Patient is due for the following Health Maintenance Topics:   Health Maintenance Due   Topic Date Due   • Annual Wellness Visit  Never done   • HEPATITIS C SCREENING  Never done   • IMM ZOSTER VACCINES (1 of 2) Never done   • PAP SMEAR  06/02/2018   • IMM INFLUENZA (1) Never done   • COVID-19 Vaccine (3 - Booster for Pfizer series) 10/21/2021     5.  Reviewed/Updated the following with patient:       •   Preferred Pharmacy? Yes       •   Preferred Lab? Yes       •   Preferred Communication? Yes       •   Allergies? Yes       •   Medications? YES. Was Abstract Encounter opened and chart updated? YES  6.  Care Team Updated:       •   DME Company (gait device, O2, CPAP, etc.): N\A       •   Other Specialists (eye doctor, derm, GYN, cardiology, endo, etc): YES    7.  Patient was advised: “This is a free wellness visit. The provider will screen for medical conditions to help you stay healthy. If you have other concerns to address you may be asked to discuss these at a separate visit or there may be an additional fee.”     8.  AHA (Puls8) form printed for Provider? Email sent to [facility] requesting form

## 2022-02-16 LAB
BACTERIA UR CULT: NORMAL
SIGNIFICANT IND 70042: NORMAL
SITE SITE: NORMAL
SOURCE SOURCE: NORMAL

## 2022-02-17 ENCOUNTER — OFFICE VISIT (OUTPATIENT)
Dept: MEDICAL GROUP | Facility: LAB | Age: 62
End: 2022-02-17
Payer: MEDICARE

## 2022-02-17 VITALS
SYSTOLIC BLOOD PRESSURE: 138 MMHG | HEART RATE: 68 BPM | DIASTOLIC BLOOD PRESSURE: 72 MMHG | HEIGHT: 68 IN | OXYGEN SATURATION: 95 % | RESPIRATION RATE: 16 BRPM | BODY MASS INDEX: 37.74 KG/M2 | TEMPERATURE: 97.3 F | WEIGHT: 249 LBS

## 2022-02-17 DIAGNOSIS — M76.61 ACHILLES TENDINITIS OF BOTH LOWER EXTREMITIES: ICD-10-CM

## 2022-02-17 DIAGNOSIS — E04.1 THYROID NODULE: ICD-10-CM

## 2022-02-17 DIAGNOSIS — M76.62 ACHILLES TENDINITIS OF BOTH LOWER EXTREMITIES: ICD-10-CM

## 2022-02-17 DIAGNOSIS — R73.03 PREDIABETES: ICD-10-CM

## 2022-02-17 DIAGNOSIS — N93.9 ABNORMAL VAGINAL BLEEDING: ICD-10-CM

## 2022-02-17 DIAGNOSIS — E66.9 OBESITY (BMI 30-39.9): ICD-10-CM

## 2022-02-17 DIAGNOSIS — Z00.00 MEDICARE ANNUAL WELLNESS VISIT, SUBSEQUENT: ICD-10-CM

## 2022-02-17 DIAGNOSIS — F33.42 MDD (MAJOR DEPRESSIVE DISORDER), RECURRENT, IN FULL REMISSION (HCC): ICD-10-CM

## 2022-02-17 PROBLEM — M25.571 CHRONIC PAIN OF BOTH ANKLES: Status: RESOLVED | Noted: 2021-09-01 | Resolved: 2022-02-17

## 2022-02-17 PROBLEM — M25.572 CHRONIC PAIN OF BOTH ANKLES: Status: RESOLVED | Noted: 2021-09-01 | Resolved: 2022-02-17

## 2022-02-17 PROBLEM — G89.29 CHRONIC PAIN OF BOTH ANKLES: Status: RESOLVED | Noted: 2021-09-01 | Resolved: 2022-02-17

## 2022-02-17 PROCEDURE — G0438 PPPS, INITIAL VISIT: HCPCS | Performed by: NURSE PRACTITIONER

## 2022-02-17 ASSESSMENT — PATIENT HEALTH QUESTIONNAIRE - PHQ9: CLINICAL INTERPRETATION OF PHQ2 SCORE: 0

## 2022-02-17 ASSESSMENT — ACTIVITIES OF DAILY LIVING (ADL): BATHING_REQUIRES_ASSISTANCE: 0

## 2022-02-17 ASSESSMENT — ENCOUNTER SYMPTOMS: GENERAL WELL-BEING: GOOD

## 2022-02-17 ASSESSMENT — FIBROSIS 4 INDEX: FIB4 SCORE: 1.16

## 2022-02-17 NOTE — PROGRESS NOTES
Chief Complaint   Patient presents with   • Medicare Annual Wellness         HPI:  Amy is a 61 y.o. here for Medicare Annual Wellness Visit.  Overall doing well - working part time.    Moods stable with duloxetine.  Scheduled to have a D&C with gynecology because of postmenopausal bleeding, March 1.  No current bleeding.  Had COVID a few weeks ago and had a bit of bleeding during COVID.  Denies being in pain.    Patient Active Problem List    Diagnosis Date Noted   • Chronic pain of both ankles -prefers to refrain from surgery, has seen podiatry. 09/01/2021   • Prediabetes 04/27/2017   • MDD (major depressive disorder), recurrent, in full remission ([facility]) 01/05/2017   • Thyroid nodule - neg biopsy 2014.   2018.   07/08/2015   • Abnormal vaginal bleeding 07/08/2015   • Obesity (BMI 30-39.9) 11/30/2011       Current Outpatient Medications   Medication Sig Dispense Refill   • DULoxetine (CYMBALTA) 60 MG Cap DR Particles delayed-release capsule TAKE 1 CAPSULE BY MOUTH EVERY DAY 90 Capsule 1   • triamcinolone acetonide (KENALOG) 0.025 % Cream Apply 1 Application to affected area(s) 2 times a day. 45 g 2   • NON SPECIFIED Toilet seat lift.   Dx:  Achilles tendonitis 1 Each 0     No current facility-administered medications for this visit.        Patient is taking medications as noted in medication list.  Current supplements as per medication list.     Allergies: Augmentin [amoxicillin-pot clavulanate] and Sulfa drugs    Current social contact/activities: movies/ lunch with friends     Is patient current with immunizations? Yes.    She  reports that she quit smoking about 12 years ago. Her smoking use included cigarettes. She has never used smokeless tobacco. She reports current alcohol use. She reports current drug use.  Counseling given: Not Answered        DPA/Advanced directive: Patient has Advanced Directive, but it is not on file. Instructed to bring in a copy to scan into their chart.    ROS:    Gait: Uses no  assistive device   Ostomy: No   Other tubes: No   Amputations: No   Chronic oxygen use No   Last eye exam 6 months ago   Wears hearing aids: No   : Reports urinary leakage during the last 6 months that has not interfered at all with their daily activities or sleep.      Screening:      Depression Screening    Little interest or pleasure in doing things?  0 - not at all  Feeling down, depressed, or hopeless? 0 - not at all  Patient Health Questionnaire Score: 0    If depressive symptoms identified deferred to follow up visit unless specifically addressed in assessment and plan.    Interpretation of PHQ-9 Total Score   Score Severity   1-4 No Depression   5-9 Mild Depression   10-14 Moderate Depression   15-19 Moderately Severe Depression   20-27 Severe Depression    Screening for Cognitive Impairment    Three Minute Recall (captain, garden, picture)  2/3    Yuri clock face with all 12 numbers and set the hands to show 5 past 8.  Yes    If cognitive concerns identified, deferred for follow up unless specifically addressed in assessment and plan.    Fall Risk Assessment    Has the patient had two or more falls in the last year or any fall with injury in the last year?  No  If fall risk identified, deferred for follow up unless specifically addressed in assessment and plan.    Safety Assessment    Throw rugs on floor.  No  Handrails on all stairs.  Yes  Good lighting in all hallways.  Yes  Difficulty hearing.  No  Patient counseled about all safety risks that were identified.    Functional Assessment ADLs    Are there any barriers preventing you from cooking for yourself or meeting nutritional needs?  No.    Are there any barriers preventing you from driving safely or obtaining transportation?  No.    Are there any barriers preventing you from using a telephone or calling for help?  No.    Are there any barriers preventing you from shopping?  No.    Are there any barriers preventing you from taking care of your own  finances?  No.    Are there any barriers preventing you from managing your medications?  No.    Are there any barriers preventing you from showering, bathing or dressing yourself?  No.    Are you currently engaging in any exercise or physical activity?  Yes.     What is your perception of your health?  Good.    Health Maintenance Summary          Overdue - Annual Wellness Visit (Once) Overdue - never done    No completion history exists for this topic.          Overdue - HEPATITIS C SCREENING (Once) Overdue - never done    No completion history exists for this topic.          Overdue - IMM ZOSTER VACCINES (1 of 2) Overdue - never done    No completion history exists for this topic.          Overdue - PAP SMEAR (Every 3 Years) Overdue since 6/2/2018 06/02/2015  Done - Dr. Boogie          Overdue - IMM INFLUENZA (1) Overdue - never done    No completion history exists for this topic.          Overdue - COVID-19 Vaccine (3 - Booster for Pfizer series) Overdue since 10/21/2021    05/21/2021  Imm Admin: Pfizer SARS-CoV-2 Vaccine    04/27/2021  Imm Admin: Pfizer SARS-CoV-2 Vaccine          MAMMOGRAM (Yearly) Tentatively due on 4/5/2022 04/05/2021  MA-SCREENING MAMMO BILAT W/TOMOSYNTHESIS W/CAD    03/05/2020  MA-SCREENING MAMMO BILAT W/TOMOSYNTHESIS W/CAD    05/08/2018  MA-MAMMO SCREENING BILAT W/LUCAS W/CAD    06/29/2015  MA-SCREEN MAMMO W/CAD-BILAT    02/01/2013  MA-SCREENING MAMMOGRAM W/ CAD    Only the first 5 history entries have been loaded, but more history exists.          COLORECTAL CANCER SCREENING (COLONOSCOPY - Every 5 Years) Next due on 12/7/2023 12/07/2018  REFERRAL TO GI FOR COLONOSCOPY    07/17/2015  AMB REFERRAL TO GI FOR COLONOSCOPY          IMM DTaP/Tdap/Td Vaccine (2 - Td or Tdap) Next due on 4/13/2028 04/13/2018  Imm Admin: Tdap Vaccine          IMM HEP B VACCINE (Series Information) Aged Out    No completion history exists for this topic.          IMM MENINGOCOCCAL VACCINE (MCV4) (Series  "Information) Aged Out    No completion history exists for this topic.          IMM PNEUMOCOCCAL VACCINE: 0-64 Years (Series Information) Aged Out    No completion history exists for this topic.                Patient Care Team:  CLAUDIO Kovacs as PCP - General (Family Medicine)  CLAUDIO Kovacs as PCP - [facility] Paneled  Eye [place] as Consulting Physician (Ophthalmology)  CLAUDIO Kovacs (Family Medicine)  Roberto Fine D.O. (Obstetrics & Gynecology)    Social History     Tobacco Use   • Smoking status: Former Smoker     Types: Cigarettes     Quit date: 2010     Years since quittin.0   • Smokeless tobacco: Never Used   Vaping Use   • Vaping Use: Never used   Substance Use Topics   • Alcohol use: Yes     Comment: infrequently   • Drug use: Yes     Comment: CBD balm for ankles     Family History   Problem Relation Age of Onset   • Cancer Mother         lung   • Cancer Father         prostate   • Dementia Father    • Alcohol abuse Brother    • Heart Disease Paternal Uncle    • Depression Maternal Aunt    • Bipolar disorder Cousin    • Diabetes Neg Hx    • Stroke Neg Hx      She  has a past medical history of Anxiety, Arthritis, Depression, Fibroid uterus, Infectious disease (2022), and Obesity.   Past Surgical History:   Procedure Laterality Date   • HYSTEROSCOPY WITH VIDEO DIAGNOSTIC       Exam:     /72 (BP Location: Left arm, Patient Position: Sitting, BP Cuff Size: Large adult)   Pulse 68   Temp 36.3 °C (97.3 °F)   Resp 16   Ht 1.727 m (5' 8\")   Wt 113 kg (249 lb)   SpO2 95%  Body mass index is 37.86 kg/m².    Hearing excellent.    Dentition good  Alert, oriented in no acute distress.  Eye contact is good, speech goal directed, affect calm  CV: Regular rate and rhythm  Lungs: Clear to auscultation bilaterally    Assessment and Plan. The following treatment and monitoring plan is recommended:   1. Medicare annual wellness visit, subsequent    2. Obesity " (BMI 30-39.9)  -Courage continued efforts at portion control, exercise and weight loss.  She has been successful in losing 14 pounds over the past 5 months.    3. Thyroid nodule - neg biopsy 2014.  US 2018.    -Negative biopsy.  Last ultrasound did not show any significant change.  Continue to monitor.    4. Abnormal vaginal bleeding  -Cared for closely by gynecology.  Awaiting D&C in March.  Not having any pelvic pain.    5. Achilles tendinitis of both lower extremities  -Not having any acute issues with this.  Encouraged her to stretch and notify me if this flares up on her.    6. MDD (major depressive disorder), recurrent, in full remission (HCC)  -Stable.  Continue same.    7. Prediabetes  -Discussed her previous A1c in depth from November.  Reiterated the importance of continuing to follow a very low carbohydrate diet.  Discussed sources of lean protein, high-fiber and vegetable-based.  Discussed the importance of exercise.  Follow-up 3 months -  A1c in the office.    Services suggested: No services needed at this time  Health Care Screening recommendations as per orders if indicated.  Referrals offered: PT/OT/Nutrition counseling/Behavioral Health/Smoking cessation as per orders if indicated.    Discussion today about general wellness and lifestyle habits:    · Prevent falls and reduce trip hazards; Cautioned about securing or removing rugs.  · Have a working fire alarm and carbon monoxide detector;   · Engage in regular physical activity and social activities       Follow-up 3 months

## 2022-02-22 RX ORDER — DULOXETIN HYDROCHLORIDE 60 MG/1
60 CAPSULE, DELAYED RELEASE ORAL
Qty: 90 CAPSULE | Refills: 1 | Status: SHIPPED | OUTPATIENT
Start: 2022-02-22 | End: 2022-08-29

## 2022-03-01 ENCOUNTER — ANESTHESIA EVENT (OUTPATIENT)
Dept: SURGERY | Facility: MEDICAL CENTER | Age: 62
End: 2022-03-01
Payer: MEDICARE

## 2022-03-01 ENCOUNTER — ANESTHESIA (OUTPATIENT)
Dept: SURGERY | Facility: MEDICAL CENTER | Age: 62
End: 2022-03-01
Payer: MEDICARE

## 2022-03-01 ENCOUNTER — HOSPITAL ENCOUNTER (OUTPATIENT)
Facility: MEDICAL CENTER | Age: 62
End: 2022-03-01
Attending: OBSTETRICS & GYNECOLOGY | Admitting: OBSTETRICS & GYNECOLOGY
Payer: MEDICARE

## 2022-03-01 VITALS
OXYGEN SATURATION: 95 % | HEIGHT: 68 IN | DIASTOLIC BLOOD PRESSURE: 79 MMHG | BODY MASS INDEX: 37.96 KG/M2 | RESPIRATION RATE: 18 BRPM | SYSTOLIC BLOOD PRESSURE: 163 MMHG | TEMPERATURE: 97.4 F | HEART RATE: 73 BPM | WEIGHT: 250.44 LBS

## 2022-03-01 PROBLEM — Z98.890 PONV (POSTOPERATIVE NAUSEA AND VOMITING): Status: ACTIVE | Noted: 2022-03-01

## 2022-03-01 PROBLEM — R11.2 PONV (POSTOPERATIVE NAUSEA AND VOMITING): Status: ACTIVE | Noted: 2022-03-01

## 2022-03-01 LAB — PATHOLOGY CONSULT NOTE: NORMAL

## 2022-03-01 PROCEDURE — 160025 RECOVERY II MINUTES (STATS): Performed by: OBSTETRICS & GYNECOLOGY

## 2022-03-01 PROCEDURE — 88305 TISSUE EXAM BY PATHOLOGIST: CPT | Mod: 59

## 2022-03-01 PROCEDURE — 160048 HCHG OR STATISTICAL LEVEL 1-5: Performed by: OBSTETRICS & GYNECOLOGY

## 2022-03-01 PROCEDURE — 700105 HCHG RX REV CODE 258: Performed by: OBSTETRICS & GYNECOLOGY

## 2022-03-01 PROCEDURE — 160035 HCHG PACU - 1ST 60 MINS PHASE I: Performed by: OBSTETRICS & GYNECOLOGY

## 2022-03-01 PROCEDURE — 502587 HCHG PACK, D&C: Performed by: OBSTETRICS & GYNECOLOGY

## 2022-03-01 PROCEDURE — 160029 HCHG SURGERY MINUTES - 1ST 30 MINS LEVEL 4: Performed by: OBSTETRICS & GYNECOLOGY

## 2022-03-01 PROCEDURE — 160046 HCHG PACU - 1ST 60 MINS PHASE II: Performed by: OBSTETRICS & GYNECOLOGY

## 2022-03-01 PROCEDURE — 160041 HCHG SURGERY MINUTES - EA ADDL 1 MIN LEVEL 4: Performed by: OBSTETRICS & GYNECOLOGY

## 2022-03-01 PROCEDURE — 160002 HCHG RECOVERY MINUTES (STAT): Performed by: OBSTETRICS & GYNECOLOGY

## 2022-03-01 PROCEDURE — 700101 HCHG RX REV CODE 250: Performed by: ANESTHESIOLOGY

## 2022-03-01 PROCEDURE — 700101 HCHG RX REV CODE 250: Performed by: OBSTETRICS & GYNECOLOGY

## 2022-03-01 PROCEDURE — A9270 NON-COVERED ITEM OR SERVICE: HCPCS | Performed by: ANESTHESIOLOGY

## 2022-03-01 PROCEDURE — 502240 HCHG MISC OR SUPPLY RC 0272: Performed by: OBSTETRICS & GYNECOLOGY

## 2022-03-01 PROCEDURE — 160009 HCHG ANES TIME/MIN: Performed by: OBSTETRICS & GYNECOLOGY

## 2022-03-01 PROCEDURE — 700111 HCHG RX REV CODE 636 W/ 250 OVERRIDE (IP): Performed by: ANESTHESIOLOGY

## 2022-03-01 PROCEDURE — 700102 HCHG RX REV CODE 250 W/ 637 OVERRIDE(OP): Performed by: ANESTHESIOLOGY

## 2022-03-01 RX ORDER — SODIUM CHLORIDE, SODIUM LACTATE, POTASSIUM CHLORIDE, CALCIUM CHLORIDE 600; 310; 30; 20 MG/100ML; MG/100ML; MG/100ML; MG/100ML
INJECTION, SOLUTION INTRAVENOUS CONTINUOUS
Status: DISCONTINUED | OUTPATIENT
Start: 2022-03-01 | End: 2022-03-01 | Stop reason: HOSPADM

## 2022-03-01 RX ORDER — LIDOCAINE HYDROCHLORIDE 20 MG/ML
INJECTION, SOLUTION EPIDURAL; INFILTRATION; INTRACAUDAL; PERINEURAL PRN
Status: DISCONTINUED | OUTPATIENT
Start: 2022-03-01 | End: 2022-03-01 | Stop reason: SURG

## 2022-03-01 RX ORDER — ACETAMINOPHEN 500 MG
1000 TABLET ORAL ONCE
Status: COMPLETED | OUTPATIENT
Start: 2022-03-01 | End: 2022-03-01

## 2022-03-01 RX ORDER — SCOLOPAMINE TRANSDERMAL SYSTEM 1 MG/1
1 PATCH, EXTENDED RELEASE TRANSDERMAL
Status: DISCONTINUED | OUTPATIENT
Start: 2022-03-01 | End: 2022-03-01 | Stop reason: HOSPADM

## 2022-03-01 RX ORDER — MEPERIDINE HYDROCHLORIDE 25 MG/ML
12.5 INJECTION INTRAMUSCULAR; INTRAVENOUS; SUBCUTANEOUS
Status: DISCONTINUED | OUTPATIENT
Start: 2022-03-01 | End: 2022-03-01 | Stop reason: HOSPADM

## 2022-03-01 RX ORDER — HALOPERIDOL 5 MG/ML
1 INJECTION INTRAMUSCULAR
Status: DISCONTINUED | OUTPATIENT
Start: 2022-03-01 | End: 2022-03-01 | Stop reason: HOSPADM

## 2022-03-01 RX ORDER — ONDANSETRON 2 MG/ML
4 INJECTION INTRAMUSCULAR; INTRAVENOUS
Status: DISCONTINUED | OUTPATIENT
Start: 2022-03-01 | End: 2022-03-01 | Stop reason: HOSPADM

## 2022-03-01 RX ORDER — DEXAMETHASONE SODIUM PHOSPHATE 4 MG/ML
INJECTION, SOLUTION INTRA-ARTICULAR; INTRALESIONAL; INTRAMUSCULAR; INTRAVENOUS; SOFT TISSUE PRN
Status: DISCONTINUED | OUTPATIENT
Start: 2022-03-01 | End: 2022-03-01 | Stop reason: SURG

## 2022-03-01 RX ORDER — ONDANSETRON 2 MG/ML
INJECTION INTRAMUSCULAR; INTRAVENOUS PRN
Status: DISCONTINUED | OUTPATIENT
Start: 2022-03-01 | End: 2022-03-01 | Stop reason: SURG

## 2022-03-01 RX ORDER — OXYCODONE HCL 5 MG/5 ML
10 SOLUTION, ORAL ORAL
Status: DISCONTINUED | OUTPATIENT
Start: 2022-03-01 | End: 2022-03-01 | Stop reason: HOSPADM

## 2022-03-01 RX ORDER — KETOROLAC TROMETHAMINE 30 MG/ML
INJECTION, SOLUTION INTRAMUSCULAR; INTRAVENOUS PRN
Status: DISCONTINUED | OUTPATIENT
Start: 2022-03-01 | End: 2022-03-01 | Stop reason: SURG

## 2022-03-01 RX ORDER — OXYCODONE HCL 5 MG/5 ML
5 SOLUTION, ORAL ORAL
Status: DISCONTINUED | OUTPATIENT
Start: 2022-03-01 | End: 2022-03-01 | Stop reason: HOSPADM

## 2022-03-01 RX ADMIN — LIDOCAINE HYDROCHLORIDE 0.1 ML: 10 INJECTION, SOLUTION INFILTRATION; PERINEURAL at 12:08

## 2022-03-01 RX ADMIN — SCOPALAMINE 1 PATCH: 1 PATCH, EXTENDED RELEASE TRANSDERMAL at 12:17

## 2022-03-01 RX ADMIN — ACETAMINOPHEN 1000 MG: 500 TABLET ORAL at 12:07

## 2022-03-01 RX ADMIN — FENTANYL CITRATE 25 MCG: 50 INJECTION, SOLUTION INTRAMUSCULAR; INTRAVENOUS at 12:49

## 2022-03-01 RX ADMIN — FENTANYL CITRATE 50 MCG: 50 INJECTION, SOLUTION INTRAMUSCULAR; INTRAVENOUS at 12:31

## 2022-03-01 RX ADMIN — SODIUM CHLORIDE, POTASSIUM CHLORIDE, SODIUM LACTATE AND CALCIUM CHLORIDE: 600; 310; 30; 20 INJECTION, SOLUTION INTRAVENOUS at 12:07

## 2022-03-01 RX ADMIN — FENTANYL CITRATE 25 MCG: 50 INJECTION, SOLUTION INTRAMUSCULAR; INTRAVENOUS at 12:44

## 2022-03-01 RX ADMIN — LIDOCAINE HYDROCHLORIDE 100 MG: 20 INJECTION, SOLUTION EPIDURAL; INFILTRATION; INTRACAUDAL at 12:31

## 2022-03-01 RX ADMIN — ONDANSETRON 4 MG: 2 INJECTION INTRAMUSCULAR; INTRAVENOUS at 12:49

## 2022-03-01 RX ADMIN — DEXAMETHASONE SODIUM PHOSPHATE 8 MG: 4 INJECTION, SOLUTION INTRA-ARTICULAR; INTRALESIONAL; INTRAMUSCULAR; INTRAVENOUS; SOFT TISSUE at 12:36

## 2022-03-01 RX ADMIN — PROPOFOL 150 MG: 10 INJECTION, EMULSION INTRAVENOUS at 12:31

## 2022-03-01 RX ADMIN — KETOROLAC TROMETHAMINE 30 MG: 30 INJECTION, SOLUTION INTRAMUSCULAR at 12:49

## 2022-03-01 RX ADMIN — MIDAZOLAM 2 MG: 1 INJECTION INTRAMUSCULAR; INTRAVENOUS at 12:28

## 2022-03-01 ASSESSMENT — FIBROSIS 4 INDEX: FIB4 SCORE: 1.16

## 2022-03-01 ASSESSMENT — PAIN SCALES - GENERAL: PAIN_LEVEL: 1

## 2022-03-01 ASSESSMENT — PAIN DESCRIPTION - PAIN TYPE
TYPE: SURGICAL PAIN
TYPE: SURGICAL PAIN

## 2022-03-01 NOTE — ANESTHESIA TIME REPORT
Anesthesia Start and Stop Event Times     Date Time Event    3/1/2022 1214 Ready for Procedure     1224 Anesthesia Start     1303 Anesthesia Stop        Responsible Staff  03/01/22    Name Role Begin End    Sonal Araujo M.D. Anesth 1224 1303        Preop Diagnosis (Free Text):  Pre-op Diagnosis     POST MENOPAUSAL BLEEDING, POLYP        Preop Diagnosis (Codes):    Premium Reason  Non-Premium    Comments:

## 2022-03-01 NOTE — ANESTHESIA POSTPROCEDURE EVALUATION
Patient: Amy Denson    Procedure Summary     Date: 03/01/22 Room / Location: [facility] ROOM 25 / SURGERY SAME DAY [facility]    Anesthesia Start: 1224 Anesthesia Stop: 1303    Procedures:       HYSTEROSCOPY, WITH TISSUE REMOVAL, USING HYSTEROSCOPIC ROTATING CUTTER BLADE - POLYPECTOMY (N/A Uterus)      DILATION AND CURETTAGE (N/A Uterus) Diagnosis: (POST MENOPAUSAL BLEEDING, POLYP)    Surgeons: Roberto Fine D.O. Responsible Provider: Sonal Araujo M.D.    Anesthesia Type: general ASA Status: 2          Final Anesthesia Type: general  Last vitals  BP   Blood Pressure: 146/69    Temp   36.3 °C (97.4 °F)    Pulse   76   Resp   16    SpO2   91 %      Anesthesia Post Evaluation    Patient location during evaluation: PACU  Patient participation: complete - patient participated  Level of consciousness: awake  Pain score: 1    Airway patency: patent  Anesthetic complications: no  Cardiovascular status: adequate  Respiratory status: acceptable  Hydration status: acceptable    PONV: none          No complications documented.     Nurse Pain Score: 1 (NPRS)

## 2022-03-01 NOTE — OR NURSING
1305- Report received from Maggy, Ifrah.  Pt tolerating sips of water without complication.  No complaints of pain at this time.     1320- Pt c/o cramping pain 1/10 to abdomen.  Heat pack applied.      1332- Discharge instructions discussed with patient's .  All questions answered at this time.     1335- Handoff report given back to Maggy, VONDA.

## 2022-03-01 NOTE — ANESTHESIA PROCEDURE NOTES
Airway    Date/Time: 3/1/2022 12:32 PM  Performed by: Sonal Araujo M.D.  Authorized by: Sonal Araujo M.D.     Location:  OR  Urgency:  Elective  Indications for Airway Management:  Anesthesia      Spontaneous Ventilation: absent    Sedation Level:  Deep  Preoxygenated: Yes    Mask Difficulty Assessment:  0 - not attempted  Final Airway Type:  Supraglottic airway  Final Supraglottic Airway:  Standard LMA    SGA Size:  4  Number of Attempts at Approach:  1

## 2022-03-01 NOTE — ANESTHESIA PREPROCEDURE EVALUATION
Case: [id number] Anesthesia Start Date/Time: 03/01/22 1228    Procedures:       HYSTEROSCOPY, WITH TISSUE REMOVAL, USING HYSTEROSCOPIC ROTATING CUTTER BLADE - POSSIBLE POLYPECTOMY (N/A Uterus)      DILATION AND CURETTAGE (N/A Uterus)    Anesthesia type: General    Pre-op diagnosis: POST MENOPAUSAL BLEEDING, POLYP    Location: [facility] ROOM 25 / SURGERY SAME DAY [facility]    Surgeons: Roberto Fine D.O.        60 yo w/postmenopausal bleeding    Relevant Problems   ANESTHESIA   (positive) PONV (postoperative nausea and vomiting)     Obesity  COVID early-mid January    Denies CAD, CP/SOB, CVA, HTN, DM, LUNG/LIVER/KIDNEY DZ, GERD, URI    Physical Exam    Airway   Mallampati: II  TM distance: >3 FB  Neck ROM: full       Cardiovascular   Rhythm: regular  Rate: normal  (-) murmur     Dental - normal exam           Pulmonary   Breath sounds clear to auscultation     Abdominal    Neurological - normal exam                 Anesthesia Plan    ASA 2       Plan - general       Airway plan will be LMA          Induction: intravenous    Postoperative Plan: Postoperative administration of opioids is intended.    Pertinent diagnostic labs and testing reviewed    Informed Consent:    Anesthetic plan and risks discussed with patient.

## 2022-03-01 NOTE — DISCHARGE INSTRUCTIONS
ACTIVITY: Rest and take it easy for the first 24 hours.  A responsible adult is recommended to remain with you during that time.  It is normal to feel sleepy.  We encourage you to not do anything that requires balance, judgment or coordination.    MILD FLU-LIKE SYMPTOMS ARE NORMAL. YOU MAY EXPERIENCE GENERALIZED MUSCLE ACHES, THROAT IRRITATION, HEADACHE AND/OR SOME NAUSEA.    FOR 24 HOURS DO NOT:  Drive, operate machinery or run household appliances.  Drink beer or alcoholic beverages.   Make important decisions or sign legal documents.    SPECIAL INSTRUCTIONS: See Handout    DIET: To avoid nausea, slowly advance diet as tolerated, avoiding spicy or greasy foods for the first day.  Add more substantial food to your diet according to your physician's instructions.  Babies can be fed formula or breast milk as soon as they are hungry.  INCREASE FLUIDS AND FIBER TO AVOID CONSTIPATION.    SURGICAL DRESSING/BATHING: Ok to shower tomorrow, avoid submerging in water (hot tub, pool, bath) until ok with doctor    FOLLOW-UP APPOINTMENT:  A follow-up appointment should be arranged with Dr. Fine [phone]; call to schedule.    You should CALL YOUR PHYSICIAN if you develop:  Fever greater than 101 degrees F.  Pain not relieved by medication, or persistent nausea or vomiting.  Excessive bleeding (blood soaking through dressing) or unexpected drainage from the wound.  Extreme redness or swelling around the incision site, drainage of pus or foul smelling drainage.  Inability to urinate or empty your bladder within 8 hours.  Problems with breathing or chest pain.    You should call 911 if you develop problems with breathing or chest pain.  If you are unable to contact your doctor or surgical center, you should go to the nearest emergency room or urgent care center.  Physician's telephone #: Dr. Fine [phone]    If any questions arise, call your doctor.  If your doctor is not available, please feel free to call the  Surgical Center at [phone].     A registered nurse may call you a few days after your surgery to see how you are doing after your procedure.    MEDICATIONS: Resume taking daily medication.  Take prescribed pain medication with food.  If no medication is prescribed, you may take non-aspirin pain medication if needed.  PAIN MEDICATION CAN BE VERY CONSTIPATING.  Take a stool softener or laxative such as senokot, pericolace, or milk of magnesia if needed.    Last pain medication given: Tylenol at 12:07, Toradol (like ibuprofen) at 12:50    If your physician has prescribed pain medication that includes Acetaminophen (Tylenol), do not take additional Acetaminophen (Tylenol) while taking the prescribed medication.    Depression / Suicide Risk    As you are discharged from this [facility] facility, it is important to learn how to keep safe from harming yourself.    Recognize the warning signs:  · Abrupt changes in personality, positive or negative- including increase in energy   · Giving away possessions  · Change in eating patterns- significant weight changes-  positive or negative  · Change in sleeping patterns- unable to sleep or sleeping all the time   · Unwillingness or inability to communicate  · Depression  · Unusual sadness, discouragement and loneliness  · Talk of wanting to die  · Neglect of personal appearance   · Rebelliousness- reckless behavior  · Withdrawal from people/activities they love  · Confusion- inability to concentrate     If you or a loved one observes any of these behaviors or has concerns about self-harm, here's what you can do:  · Talk about it- your feelings and reasons for harming yourself  · Remove any means that you might use to hurt yourself (examples: pills, rope, extension cords, firearm)  · Get professional help from the community (Mental Health, Substance Abuse, psychological counseling)  · Do not be alone:Call your Safe Contact- someone whom you trust who will be there for  you.  · Call your local CRISIS HOTLINE 180-4550 or 783-019-1461  · Call your local Children's Mobile Crisis Response Team [place] (594) 282-4501 or www.[employer]  · Call the toll free National Suicide Prevention Hotlines   · National Suicide Prevention Lifeline 388-976-FTBP (9591)  · National [employer] 800-SUICIDE (497-3524)

## 2022-03-01 NOTE — OR NURSING
1300 Pt to PACU from OR. Report from anesthesia and OR RN. On 4L O2 via mask. Respirations even and unlabored. VSS.     1330 patient meets phase 2 criteria.    1340 patient tolerating water.    1405 patient able to void. Patient dressed without problems. No complaints of nausea or pain at this time.    1414 PIV removed with tip intact.    1418 Pt escorted out of department in wheelchair with all belongings. Discharged home with responsible adult.

## 2022-03-01 NOTE — OP REPORT
Hysteroscopy/D&C Procedure Note     Name: Amy Denson MRN [id number]   YOB: 1960   Date: 3/1/2022   Time: 12:57 PM   Pre-operative Diagnosis: AUB, Polyp   Post-operatIve Diagnosis: same     Surgeon(s) and Role:     * Roberto Fine D.O. - Primary     Anesthesiologist: Sonal Araujo M.D.   Anesthesia: General     Procedure: 1. Exam under anesthesia.   2. Diagnostic hysteroscopy.   3. Dilation and curettage.    4. Polypectomy  Estimated Blood Loss: Minimal  Complications: none  Findings: polyp in endometrial cavity.  Otherwise normal appearing cavity.  Both ostea seen.  Specimens:   ID Type Source Tests Collected by Time Destination   A : endometrial polyp Other Other PATHOLOGY SPECIMEN Roberto Fine D.O. 3/1/2022 12:44 PM    B : endometrial curettings Other Other PATHOLOGY SPECIMEN Roberto Fine D.O. 3/1/2022 12:44 PM        Procedure in Detail:   The patient was taken to the operating room with her IV fluids running. The patient was identified and a timeout was performed. She was placed under general anesthesia. She was positioned in dorsal lithotomy position. She was then prepped and draped in the normal sterile fashion. A red rubber catheter was placed in the bladder, and urine was drained. The anterior lip of the cervix was grasped with a single-tooth tenaculum, and the cervix was dilated.  The uterus sounded to approximately 7 cm.  The Myosure hysteroscope was introduced into the uterus and was filled with saline. The above findings were noted. The Myosure device was inserted through the operative port and the polyp was removed.  Excellent hemostasis was noted.  The hysteroscope was removed.  Sharp curettage was performed without difficulty. The hysteroscope was reinserted and the cavity was inspected.  The procedure was then terminated.  All instruments were removed.  Sponge, needle, and instrument counts were correct times two.    Disposition: PACU - hemodynamically  stable.  Condition: stable    Attending Attestation: I was present and scrubbed for the entire procedure.    Roberto Fine D.O.

## 2022-04-11 ENCOUNTER — OFFICE VISIT (OUTPATIENT)
Dept: MEDICAL GROUP | Facility: LAB | Age: 62
End: 2022-04-11
Payer: MEDICARE

## 2022-04-11 ENCOUNTER — HOSPITAL ENCOUNTER (OUTPATIENT)
Dept: LAB | Facility: MEDICAL CENTER | Age: 62
End: 2022-04-11
Attending: PHYSICIAN ASSISTANT
Payer: MEDICARE

## 2022-04-11 VITALS
BODY MASS INDEX: 37.13 KG/M2 | OXYGEN SATURATION: 97 % | WEIGHT: 245 LBS | DIASTOLIC BLOOD PRESSURE: 78 MMHG | SYSTOLIC BLOOD PRESSURE: 130 MMHG | HEIGHT: 68 IN | TEMPERATURE: 97.9 F | HEART RATE: 80 BPM | RESPIRATION RATE: 16 BRPM

## 2022-04-11 DIAGNOSIS — R19.7 DIARRHEA, UNSPECIFIED TYPE: ICD-10-CM

## 2022-04-11 LAB
ALBUMIN SERPL BCP-MCNC: 4.3 G/DL (ref 3.2–4.9)
ALBUMIN/GLOB SERPL: 1.3 G/DL
ALP SERPL-CCNC: 75 U/L (ref 30–99)
ALT SERPL-CCNC: 12 U/L (ref 2–50)
AMYLASE SERPL-CCNC: 41 U/L (ref 20–103)
ANION GAP SERPL CALC-SCNC: 13 MMOL/L (ref 7–16)
AST SERPL-CCNC: 12 U/L (ref 12–45)
BASOPHILS # BLD AUTO: 0.3 % (ref 0–1.8)
BASOPHILS # BLD: 0.04 K/UL (ref 0–0.12)
BILIRUB SERPL-MCNC: 0.5 MG/DL (ref 0.1–1.5)
BUN SERPL-MCNC: 15 MG/DL (ref 8–22)
CALCIUM SERPL-MCNC: 9.7 MG/DL (ref 8.5–10.5)
CHLORIDE SERPL-SCNC: 107 MMOL/L (ref 96–112)
CO2 SERPL-SCNC: 20 MMOL/L (ref 20–33)
COMMENT 1642: NORMAL
CREAT SERPL-MCNC: 0.76 MG/DL (ref 0.5–1.4)
EOSINOPHIL # BLD AUTO: 2.66 K/UL (ref 0–0.51)
EOSINOPHIL NFR BLD: 22.4 % (ref 0–6.9)
ERYTHROCYTE [DISTWIDTH] IN BLOOD BY AUTOMATED COUNT: 49.1 FL (ref 35.9–50)
GFR SERPLBLD CREATININE-BSD FMLA CKD-EPI: 90 ML/MIN/1.73 M 2
GLOBULIN SER CALC-MCNC: 3.3 G/DL (ref 1.9–3.5)
GLUCOSE SERPL-MCNC: 149 MG/DL (ref 65–99)
HCT VFR BLD AUTO: 54.2 % (ref 37–47)
HGB BLD-MCNC: 17 G/DL (ref 12–16)
IMM GRANULOCYTES # BLD AUTO: 0.04 K/UL (ref 0–0.11)
IMM GRANULOCYTES NFR BLD AUTO: 0.3 % (ref 0–0.9)
LIPASE SERPL-CCNC: 19 U/L (ref 11–82)
LYMPHOCYTES # BLD AUTO: 3.03 K/UL (ref 1–4.8)
LYMPHOCYTES NFR BLD: 25.5 % (ref 22–41)
MCH RBC QN AUTO: 30.7 PG (ref 27–33)
MCHC RBC AUTO-ENTMCNC: 31.4 G/DL (ref 33.6–35)
MCV RBC AUTO: 97.8 FL (ref 81.4–97.8)
MONOCYTES # BLD AUTO: 0.8 K/UL (ref 0–0.85)
MONOCYTES NFR BLD AUTO: 6.7 % (ref 0–13.4)
MORPHOLOGY BLD-IMP: NORMAL
NEUTROPHILS # BLD AUTO: 5.3 K/UL (ref 2–7.15)
NEUTROPHILS NFR BLD: 44.8 % (ref 44–72)
NRBC # BLD AUTO: 0 K/UL
NRBC BLD-RTO: 0 /100 WBC
PLATELET # BLD AUTO: 261 K/UL (ref 164–446)
PMV BLD AUTO: 9 FL (ref 9–12.9)
POTASSIUM SERPL-SCNC: 4.1 MMOL/L (ref 3.6–5.5)
PROT SERPL-MCNC: 7.6 G/DL (ref 6–8.2)
RBC # BLD AUTO: 5.54 M/UL (ref 4.2–5.4)
SODIUM SERPL-SCNC: 140 MMOL/L (ref 135–145)
WBC # BLD AUTO: 11.9 K/UL (ref 4.8–10.8)

## 2022-04-11 PROCEDURE — 85025 COMPLETE CBC W/AUTO DIFF WBC: CPT

## 2022-04-11 PROCEDURE — 83690 ASSAY OF LIPASE: CPT

## 2022-04-11 PROCEDURE — 80053 COMPREHEN METABOLIC PANEL: CPT

## 2022-04-11 PROCEDURE — 36415 COLL VENOUS BLD VENIPUNCTURE: CPT

## 2022-04-11 PROCEDURE — 82150 ASSAY OF AMYLASE: CPT

## 2022-04-11 PROCEDURE — 99214 OFFICE O/P EST MOD 30 MIN: CPT | Performed by: PHYSICIAN ASSISTANT

## 2022-04-11 RX ORDER — CHOLESTYRAMINE 4 G/9G
1 POWDER, FOR SUSPENSION ORAL DAILY
Qty: 10 EACH | Refills: 0 | Status: SHIPPED | OUTPATIENT
Start: 2022-04-11 | End: 2022-08-08 | Stop reason: SDUPTHER

## 2022-04-11 ASSESSMENT — FIBROSIS 4 INDEX: FIB4 SCORE: 1.18

## 2022-04-11 NOTE — PROGRESS NOTES
"Subjective:     CC: diarrhea x1 month    HPI:   Amy here today with    Pt reports on/off diarrhea for the past month. Most recent episode for the past 3 days. BM up every to every 2 hours, worse at night.   Drinking gatorade, eating bland diet. Taking immodium, last dose 9am, which was helping in previous episodes but now is not. Diarrhea in the form of liquid \"garlicky\" smelling stool    No known dietary triggers  No recent sick contacts, dx'd with covid in Jan  No recent camping or travel  No new medications or supplements    No hx of GI issue, GI surgery      ROS:  No fever, chills  No N/V at this time  No abd pain at this time, + feeling gassy, bloated  No dark or bloody diarrhea, no coffee ground stool    Current Outpatient Medications Ordered in Epic   Medication Sig Dispense Refill   • cholestyramine (QUESTRAN) 4 g packet Take 4 g by mouth every day for 10 days. 10 Each 0   • DULoxetine (CYMBALTA) 60 MG Cap DR Particles delayed-release capsule TAKE 1 CAPSULE BY MOUTH EVERY DAY 90 Capsule 1   • triamcinolone acetonide (KENALOG) 0.025 % Cream Apply 1 Application to affected area(s) 2 times a day. 45 g 2   • NON SPECIFIED Toilet seat lift.   Dx:  Achilles tendonitis 1 Each 0     No current [facility]-ordered facility-administered medications on file.     Objective:     Exam:  /78   Pulse 80   Temp 36.6 °C (97.9 °F)   Resp 16   Ht 1.727 m (5' 8\")   Wt 111 kg (245 lb)   LMP 12/27/2016   SpO2 97%   BMI 37.25 kg/m²  Body mass index is 37.25 kg/m².    Gen: Alert and oriented, No apparent distress.  Neck: Neck is supple without lymphadenopathy.  Lungs: Normal effort, CTA bilaterally, no wheezes, rhonchi, or rales  CV: Regular rate and rhythm. No murmurs, rubs, or gallops.  Ext: No clubbing, cyanosis, edema.  Abd: Soft, nontender, nondistended, hyperactive bowel sounds noted    Assessment & Plan:     62 y.o. female with the following -     Problem List Items Addressed This Visit    None     Visit Diagnoses "     Diarrhea, unspecified type        Relevant Medications    cholestyramine (QUESTRAN) 4 g packet    Other Relevant Orders    AMYLASE    H. PYLORI BREATH TEST    LIPASE    URINALYSIS,CULTURE IF INDICATED    CBC WITH DIFFERENTIAL    Comp Metabolic Panel    CULTURE STOOL          I spent a total of 15 minutes with record review, exam, communication with the patient, communication with other providers, and documentation of this encounter.      Return if symptoms worsen or fail to improve.    Please note that this dictation was created using voice recognition software. I have made every reasonable attempt to correct obvious errors, but there may be errors of grammar and possibly content that I did not discover before finalizing the note.

## 2022-04-12 ENCOUNTER — HOSPITAL ENCOUNTER (OUTPATIENT)
Facility: MEDICAL CENTER | Age: 62
End: 2022-04-12
Attending: PHYSICIAN ASSISTANT
Payer: MEDICARE

## 2022-04-12 PROCEDURE — 87899 AGENT NOS ASSAY W/OPTIC: CPT | Mod: 91

## 2022-04-12 PROCEDURE — 87045 FECES CULTURE AEROBIC BACT: CPT

## 2022-04-13 ENCOUNTER — HOSPITAL ENCOUNTER (OUTPATIENT)
Facility: MEDICAL CENTER | Age: 62
End: 2022-04-13
Attending: PHYSICIAN ASSISTANT
Payer: MEDICARE

## 2022-04-13 DIAGNOSIS — R19.7 DIARRHEA, UNSPECIFIED TYPE: ICD-10-CM

## 2022-04-13 LAB
APPEARANCE UR: ABNORMAL
BACTERIA #/AREA URNS HPF: NEGATIVE /HPF
BILIRUB UR QL STRIP.AUTO: NEGATIVE
CAOX CRY #/AREA URNS HPF: ABNORMAL /HPF
COLOR UR: YELLOW
EPI CELLS #/AREA URNS HPF: ABNORMAL /HPF
GLUCOSE UR STRIP.AUTO-MCNC: NEGATIVE MG/DL
HYALINE CASTS #/AREA URNS LPF: ABNORMAL /LPF
KETONES UR STRIP.AUTO-MCNC: NEGATIVE MG/DL
LEUKOCYTE ESTERASE UR QL STRIP.AUTO: NEGATIVE
MICRO URNS: ABNORMAL
MUCOUS THREADS #/AREA URNS HPF: ABNORMAL /HPF
NITRITE UR QL STRIP.AUTO: NEGATIVE
PH UR STRIP.AUTO: 5 [PH] (ref 5–8)
PROT UR QL STRIP: NEGATIVE MG/DL
RBC # URNS HPF: ABNORMAL /HPF
RBC UR QL AUTO: NEGATIVE
SP GR UR STRIP.AUTO: 1.02
UROBILINOGEN UR STRIP.AUTO-MCNC: 0.2 MG/DL
WBC #/AREA URNS HPF: ABNORMAL /HPF

## 2022-04-13 PROCEDURE — 81001 URINALYSIS AUTO W/SCOPE: CPT

## 2022-04-14 LAB
E COLI SXT1+2 STL IA: NORMAL
SIGNIFICANT IND 70042: NORMAL
SITE SITE: NORMAL
SOURCE SOURCE: NORMAL

## 2022-04-15 LAB
BACTERIA STL CULT: NORMAL
C JEJUNI+C COLI AG STL QL: NORMAL
E COLI SXT1+2 STL IA: NORMAL
SIGNIFICANT IND 70042: NORMAL
SITE SITE: NORMAL
SOURCE SOURCE: NORMAL

## 2022-05-05 ENCOUNTER — TELEPHONE (OUTPATIENT)
Dept: MEDICAL GROUP | Facility: LAB | Age: 62
End: 2022-05-05
Payer: MEDICARE

## 2022-05-05 NOTE — TELEPHONE ENCOUNTER
ESTABLISHED PATIENT PRE-VISIT PLANNING     Patient was NOT contacted to complete PVP.     Note: Patient will not be contacted if there is no indication to call.     1.  Reviewed notes from the last few office visits within the medical group: Yes    2.  If any orders were placed at last visit or intended to be done for this visit (i.e. 6 mos follow-up), do we have Results/Consult Notes?         •  Labs - Labs ordered, completed on 4/11/22,4/13/22 and results are in chart.  Note: If patient appointment is for lab review and patient did not complete labs, check with provider if OK to reschedule patient until labs completed.       •  Imaging - Imaging was not ordered at last office visit.       •  Referrals - No referrals were ordered at last office visit.    3. Is this appointment scheduled as a Hospital Follow-Up? No    4.  Immunizations were updated in Epic using Reconcile Outside Information activity? Yes    5.  Patient is due for the following Health Maintenance Topics:   Health Maintenance Due   Topic Date Due   • MAMMOGRAM  04/05/2022         6.  AHA (Pulse8) form printed for Provider? Email sent to [facility] requesting form

## 2022-05-13 ENCOUNTER — HOSPITAL ENCOUNTER (OUTPATIENT)
Facility: MEDICAL CENTER | Age: 62
End: 2022-05-13
Attending: NURSE PRACTITIONER
Payer: MEDICARE

## 2022-05-13 PROCEDURE — 83993 ASSAY FOR CALPROTECTIN FECAL: CPT

## 2022-05-13 PROCEDURE — 82705 FATS/LIPIDS FECES QUAL: CPT

## 2022-05-13 PROCEDURE — 87493 C DIFF AMPLIFIED PROBE: CPT

## 2022-05-13 PROCEDURE — 83630 LACTOFERRIN FECAL (QUAL): CPT

## 2022-05-13 PROCEDURE — 82653 EL-1 FECAL QUANTITATIVE: CPT

## 2022-05-15 LAB
C DIFF DNA SPEC QL NAA+PROBE: NEGATIVE
C DIFF TOX GENS STL QL NAA+PROBE: NEGATIVE

## 2022-05-17 ENCOUNTER — OFFICE VISIT (OUTPATIENT)
Dept: MEDICAL GROUP | Facility: LAB | Age: 62
End: 2022-05-17
Payer: MEDICARE

## 2022-05-17 VITALS
BODY MASS INDEX: 37.89 KG/M2 | HEART RATE: 68 BPM | HEIGHT: 68 IN | OXYGEN SATURATION: 95 % | WEIGHT: 250 LBS | SYSTOLIC BLOOD PRESSURE: 140 MMHG | DIASTOLIC BLOOD PRESSURE: 70 MMHG | RESPIRATION RATE: 16 BRPM | TEMPERATURE: 96.7 F

## 2022-05-17 DIAGNOSIS — E11.9 TYPE 2 DIABETES MELLITUS WITHOUT COMPLICATION, WITHOUT LONG-TERM CURRENT USE OF INSULIN (HCC): ICD-10-CM

## 2022-05-17 DIAGNOSIS — F33.42 MDD (MAJOR DEPRESSIVE DISORDER), RECURRENT, IN FULL REMISSION (HCC): ICD-10-CM

## 2022-05-17 PROBLEM — R73.03 PREDIABETES: Status: RESOLVED | Noted: 2017-04-27 | Resolved: 2022-05-17

## 2022-05-17 PROBLEM — R11.2 PONV (POSTOPERATIVE NAUSEA AND VOMITING): Status: RESOLVED | Noted: 2022-03-01 | Resolved: 2022-05-17

## 2022-05-17 PROBLEM — Z98.890 PONV (POSTOPERATIVE NAUSEA AND VOMITING): Status: RESOLVED | Noted: 2022-03-01 | Resolved: 2022-05-17

## 2022-05-17 LAB
FAT STL QL: NORMAL
HBA1C MFR BLD: 6.6 % (ref 0–5.6)
INT CON NEG: NEGATIVE
INT CON POS: POSITIVE
NEUTRAL FAT STL QL: NORMAL

## 2022-05-17 PROCEDURE — 83036 HEMOGLOBIN GLYCOSYLATED A1C: CPT | Performed by: NURSE PRACTITIONER

## 2022-05-17 PROCEDURE — 99214 OFFICE O/P EST MOD 30 MIN: CPT | Performed by: NURSE PRACTITIONER

## 2022-05-17 ASSESSMENT — FIBROSIS 4 INDEX: FIB4 SCORE: 0.8

## 2022-05-17 NOTE — PATIENT INSTRUCTIONS
-get rid of all white carbohydrates such as white rice, white bread, white pasta, potatoes.  Focus on protein, vegetables, nuts, beans, berries.  Try to get at least 15-20 grams of fiber per day.  Do not drink sugary beverages such as juice or soda.   -limit carbs to 80 grams per day or less.  For example one slice of bread is typically 20 grams of carbs.  One cup of milk is 20 grams of carbs and one cup of pasta is 40 grams of carbs.  Fruit is fairly high carbs - if you like fruit - stick to small amounts of berries. All vegetables are fine except potatoes.  Avocado is great for diabetics - it's high fiber / fat and low carb.   -try to get at least 80 grams of protein. (Example:  all animal products such as eggs, chicken, fish, nuts)  -goal of 25 grams of fiber    -exercise:  30 minutes 5 d per week even if you split this up.    -try to loose about 50 lbs this year in the next 12 months.    Today, your Healthcare Provider may have discussed the following recommendations:    1. Exercise and Physical Activity  According to the American Heart Association, it is recommended to engage in physical activity regularly and to aim for 150 minutes of moderate-intensity aerobic activity per week.  Your Healthcare Provider may have recommended taking the stairs instead of the elevator, starting or maintaining a walking program or strength-training program.    2. Emotional Well-being  Mental and emotional well-being is essential to overall health.  Your Healthcare Provider may have encouraged you to build strong, positive relationships with family and friends, become more involved in your community (by volunteering or joining a spiritual community), or focus on self-care.    3. Fall and Injury Prevention  To prevent falls and injuries and also improve your balance, your Healthcare Provider may have suggested that you use a cane or walker, start an exercise of physical therapy program, or have your vision and/or hearing  tested.    4. Urinary Leakage (Urinary Incontinence)  To control or manage the leakage of urine, your Healthcare Provider may have recommended you start bladder training exercises (such as Kegel exercises), a trial of a medication or a referral to see a specialist to discuss surgical options.

## 2022-05-17 NOTE — ASSESSMENT & PLAN NOTE
Chronic issue.  Fluctuates in between prediabetic and diabetic.  A1c today is 6.6.  She is not exercising and struggles to stay away from sweets.  She has cut out bread.  She has not lost weight.  Her job is sedentary.  She is up-to-date with her eye doctor and denies eye disease.  Denies any foot problems.  Denies dysuria.

## 2022-05-17 NOTE — ASSESSMENT & PLAN NOTE
This is a chronic issue unfortunately she feels that her moods are very stable with 60 mg of duloxetine daily.  Denies acute depression.

## 2022-05-17 NOTE — PROGRESS NOTES
Subjective:     Amy Denson is a 62 y.o. female here today for a1c check and Annual Health Assessment.    Type 2 diabetes mellitus without complication, without long-term current use of insulin (HCC)  Chronic issue.  Fluctuates in between prediabetic and diabetic.  A1c today is 6.6.  She is not exercising and struggles to stay away from sweets.  She has cut out bread.  She has not lost weight.  Her job is sedentary.  She is up-to-date with her eye doctor and denies eye disease.  Denies any foot problems.  Denies dysuria.    MDD (major depressive disorder), recurrent, in full remission (CMS-HCC)  This is a chronic issue unfortunately she feels that her moods are very stable with 60 mg of duloxetine daily.  Denies acute depression.      Health Maintenance Summary          Overdue - MAMMOGRAM (Yearly) Overdue since 4/5/2022 04/05/2021  MA-SCREENING MAMMO BILAT W/TOMOSYNTHESIS W/CAD    03/05/2020  MA-SCREENING MAMMO BILAT W/TOMOSYNTHESIS W/CAD    05/08/2018  MA-MAMMO SCREENING BILAT W/LUCAS W/CAD    06/29/2015  MA-SCREEN MAMMO W/CAD-BILAT    02/01/2013  MA-SCREENING MAMMOGRAM W/ CAD    Only the first 5 history entries have been loaded, but more history exists.          Postponed - IMM ZOSTER VACCINES (1 of 2) Postponed until 7/17/2022    No completion history exists for this topic.          Postponed - IMM INFLUENZA (Season Ended) Postponed until 2/17/2023    No completion history exists for this topic.          Annual Wellness Visit (Every 366 Days) Next due on 2/18/2023 02/17/2022  Visit Dx: Medicare annual wellness visit, subsequent          COLORECTAL CANCER SCREENING (COLONOSCOPY - Every 5 Years) Next due on 12/7/2023 12/07/2018  REFERRAL TO GI FOR COLONOSCOPY    07/17/2015  AMB REFERRAL TO GI FOR COLONOSCOPY          PAP SMEAR (Every 3 Years) Next due on 2/17/2025 02/17/2022  Done - Dr. Fine    06/02/2015  Done - Dr. Boogie          IMM DTaP/Tdap/Td Vaccine (2 - Td or Tdap) Next due on  4/13/2028 04/13/2018  Imm Admin: Tdap Vaccine          COVID-19 Vaccine (Series Information) Completed    03/08/2022  Imm Admin: COVID-19 Vaccine, unspecified - HISTORICAL DATA    05/21/2021  Imm Admin: PFIZER PURPLE CAP SARS-COV-2 VACCINATION (12+)    04/27/2021  Imm Admin: PFIZER PURPLE CAP SARS-COV-2 VACCINATION (12+)          IMM HEP B VACCINE (Series Information) Aged Out    No completion history exists for this topic.          IMM MENINGOCOCCAL VACCINE (MCV4) (Series Information) Aged Out    No completion history exists for this topic.          IMM PNEUMOCOCCAL VACCINE: 0-64 Years (Series Information) Aged Out    No completion history exists for this topic.          Discontinued - HEPATITIS C SCREENING  Discontinued    No completion history exists for this topic.                 Annual Health Assessment Questions:     1.  Are you currently engaging in any exercise or physical activity? Yes    2.  How would you describe your mood or emotional well-being today? good    3.  Have you had any falls in the last year? No    4.  Have you noticed any problems with your balance or had difficulty walking? No    5.  In the last six months have you experienced any leakage of urine? Yes    6. DPA/Advanced Directive: Patient does not have an Advanced Directive.  A packet and workshop information was given on Advanced Directives.    Current medicines (including changes today)  Current Outpatient Medications   Medication Sig Dispense Refill   • DULoxetine (CYMBALTA) 60 MG Cap DR Particles delayed-release capsule TAKE 1 CAPSULE BY MOUTH EVERY DAY 90 Capsule 1   • triamcinolone acetonide (KENALOG) 0.025 % Cream Apply 1 Application to affected area(s) 2 times a day. 45 g 2   • NON SPECIFIED Toilet seat lift.   Dx:  Achilles tendonitis 1 Each 0     No current facility-administered medications for this visit.       She  has a past medical history of Anxiety, Arthritis, Depression, Fibroid uterus, Infectious disease (01/12/2022),  "and Obesity.    Augmentin [amoxicillin-pot clavulanate] and Sulfa drugs    She  reports that she quit smoking about 12 years ago. Her smoking use included cigarettes. She has never used smokeless tobacco. She reports current alcohol use. She reports current drug use.  Counseling given: Not Answered      ROS   No chest pain, no shortness of breath, no abdominal pain.     Objective:     Physical Exam:  BP (!) 140/70 (BP Location: Right arm, Patient Position: Sitting, BP Cuff Size: Large adult)   Pulse 68   Temp 35.9 °C (96.7 °F)   Resp 16   Ht 1.727 m (5' 8\")   Wt 113 kg (250 lb)   SpO2 95%  Body mass index is 38.01 kg/m².   Gen. appears healthy in no distress   Skin appropriate for sex and age   Neck trachea is midline  Lungs unlabored breathing  Heart regular rate  Neuro gait and station normal   Psych appropriate, calm, interactive, well-groomed    Assessment and Plan:     1. Type 2 diabetes mellitus without complication, without long-term current use of insulin (HCC)  -Unfortunately she has fallen back into the diabetic range from prediabetes.  A1c today 6.6.  We had a long discussion about treatment options.  She still prefers to refrain from prescription medication for diabetes and instead would like to try harder with diet and exercise.-I encouraged her to follow a strict diabetic diet and gave her guidelines in regards to counting carbs, protein and fiber.  Encouraged her to start an exercise program and lose weight.  I will see her back here in just 3 months to check her A1c to make sure we are making progress.  She may certainly feel free to email me with any dietary questions.  Discussed long-term repercussions of type 2 diabetes.  - POCT Hemoglobin A1C    2. MDD (major depressive disorder), recurrent, in full remission (HCC)  -Stable.  Continue duloxetine.    Discussion today about general wellness and lifestyle habits:    · Engage in regular physical activity and social activities.  · Prevent falls " and reduce trip hazards; using ambulatory aides, hearing and vision testing if appropriate.  · Steps to improve urinary incontinence.  · Advanced care planning.    Follow-up 3 months for A1c in the office         PLEASE NOTE: This dictation was created using voice recognition software. I have made every reasonable attempt to correct obvious errors, but I expect that there are errors of grammar and possibly content that I did not discover before finalizing the note.

## 2022-05-18 ENCOUNTER — HOSPITAL ENCOUNTER (OUTPATIENT)
Facility: MEDICAL CENTER | Age: 62
End: 2022-05-18
Attending: NURSE PRACTITIONER
Payer: MEDICARE

## 2022-05-18 LAB
CALPROTECTIN STL-MCNT: 21 UG/G
ELASTASE PANC STL-MCNT: >800 UG/G
G LAMBLIA+C PARVUM AG STL QL RAPID: NORMAL
LACTOFERRIN STL QL IA: NEGATIVE
SIGNIFICANT IND 70042: NORMAL
SITE SITE: NORMAL
SOURCE SOURCE: NORMAL

## 2022-05-18 PROCEDURE — 87328 CRYPTOSPORIDIUM AG IA: CPT

## 2022-05-18 PROCEDURE — 87045 FECES CULTURE AEROBIC BACT: CPT

## 2022-05-18 PROCEDURE — 87899 AGENT NOS ASSAY W/OPTIC: CPT

## 2022-05-18 PROCEDURE — 87329 GIARDIA AG IA: CPT

## 2022-05-20 LAB
BACTERIA STL CULT: NORMAL
C JEJUNI+C COLI AG STL QL: NORMAL
SIGNIFICANT IND 70042: NORMAL
SITE SITE: NORMAL
SOURCE SOURCE: NORMAL

## 2022-05-25 ENCOUNTER — TELEPHONE (OUTPATIENT)
Dept: MEDICAL GROUP | Facility: LAB | Age: 62
End: 2022-05-25
Payer: MEDICARE

## 2022-05-25 ENCOUNTER — TELEPHONE (OUTPATIENT)
Dept: HEALTH INFORMATION MANAGEMENT | Facility: OTHER | Age: 62
End: 2022-05-25

## 2022-05-25 NOTE — TELEPHONE ENCOUNTER
I lvm to cb to let me know if she has had an eye exam within the last year. I also left number to call to schedule Mammo.

## 2022-07-19 ENCOUNTER — HOSPITAL ENCOUNTER (OUTPATIENT)
Dept: RADIOLOGY | Facility: MEDICAL CENTER | Age: 62
End: 2022-07-19
Attending: INTERNAL MEDICINE
Payer: MEDICARE

## 2022-07-19 DIAGNOSIS — Z12.31 VISIT FOR SCREENING MAMMOGRAM: ICD-10-CM

## 2022-07-19 PROCEDURE — 77063 BREAST TOMOSYNTHESIS BI: CPT

## 2022-08-08 DIAGNOSIS — R19.7 DIARRHEA, UNSPECIFIED TYPE: ICD-10-CM

## 2022-08-08 RX ORDER — CHOLESTYRAMINE 4 G/9G
1 POWDER, FOR SUSPENSION ORAL DAILY
Qty: 10 EACH | Refills: 0 | Status: SHIPPED | OUTPATIENT
Start: 2022-08-08 | End: 2022-08-18

## 2022-08-17 ENCOUNTER — TELEPHONE (OUTPATIENT)
Dept: MEDICAL GROUP | Facility: LAB | Age: 62
End: 2022-08-17
Payer: MEDICARE

## 2022-08-17 RX ORDER — SODIUM PICOSULFATE, MAGNESIUM OXIDE, AND ANHYDROUS CITRIC ACID 10; 3.5; 12 MG/160ML; G/160ML; G/160ML
LIQUID ORAL
COMMUNITY
Start: 2022-06-13 | End: 2022-11-30

## 2022-08-17 NOTE — TELEPHONE ENCOUNTER
ESTABLISHED PATIENT PRE-VISIT PLANNING     Patient was NOT contacted to complete PVP.     Note: Patient will not be contacted if there is no indication to call.     1.  Reviewed notes from the last few office visits within the medical group: Yes    2.  If any orders were placed at last visit or intended to be done for this visit (i.e. 6 mos follow-up), do we have Results/Consult Notes?           Labs - Labs were not ordered at last office visit.  Note: If patient appointment is for lab review and patient did not complete labs, check with provider if OK to reschedule patient until labs completed.         Imaging - Imaging ordered, completed and results are in chart.         Referrals - No referrals were ordered at last office visit.    3. Is this appointment scheduled as a Hospital Follow-Up? No    4.  Immunizations were updated in Epic using Reconcile Outside Information activity? Yes    5.  Patient is due for the following Health Maintenance Topics:   Health Maintenance Due   Topic Date Due    IMM HEP B VACCINE (1 of 3 - 3-dose series) Never done    DIABETES MONOFILAMENT / LE EXAM  Never done    IMM PNEUMOCOCCAL VACCINE: 0-64 Years (1 - PCV) Never done    IMM ZOSTER VACCINES (1 of 2) Never done    RETINAL SCREENING  10/06/2017    URINE ACR / MICROALBUMIN  04/10/2020    COVID-19 Vaccine (4 - Booster for Pfizer series) 07/08/2022     6.  AHA (Pulse8) form printed for Provider? N/A

## 2022-08-24 ENCOUNTER — OFFICE VISIT (OUTPATIENT)
Dept: MEDICAL GROUP | Facility: LAB | Age: 62
End: 2022-08-24
Payer: MEDICARE

## 2022-08-24 VITALS
BODY MASS INDEX: 38.65 KG/M2 | HEART RATE: 76 BPM | DIASTOLIC BLOOD PRESSURE: 80 MMHG | HEIGHT: 68 IN | SYSTOLIC BLOOD PRESSURE: 136 MMHG | RESPIRATION RATE: 16 BRPM | TEMPERATURE: 97.2 F | WEIGHT: 255 LBS | OXYGEN SATURATION: 97 %

## 2022-08-24 DIAGNOSIS — E66.01 MORBID (SEVERE) OBESITY DUE TO EXCESS CALORIES (HCC): ICD-10-CM

## 2022-08-24 DIAGNOSIS — E11.9 TYPE 2 DIABETES MELLITUS WITHOUT COMPLICATION, WITHOUT LONG-TERM CURRENT USE OF INSULIN (HCC): ICD-10-CM

## 2022-08-24 LAB
HBA1C MFR BLD: 7.2 % (ref 0–5.6)
INT CON NEG: NEGATIVE
INT CON POS: POSITIVE

## 2022-08-24 PROCEDURE — 99214 OFFICE O/P EST MOD 30 MIN: CPT | Performed by: NURSE PRACTITIONER

## 2022-08-24 PROCEDURE — 83036 HEMOGLOBIN GLYCOSYLATED A1C: CPT | Performed by: NURSE PRACTITIONER

## 2022-08-24 RX ORDER — METFORMIN HYDROCHLORIDE 500 MG/1
500 TABLET, EXTENDED RELEASE ORAL DAILY
Qty: 30 TABLET | Refills: 5 | Status: SHIPPED | OUTPATIENT
Start: 2022-08-24 | End: 2022-08-26 | Stop reason: SDUPTHER

## 2022-08-24 ASSESSMENT — FIBROSIS 4 INDEX: FIB4 SCORE: 0.8

## 2022-08-24 NOTE — PROGRESS NOTES
"CC  F/u       HPI:  Amy is a 62-year-old established female here for A1c.  Last A1c was 10 months ago at 6.2, has been as high as 6.8 in 2017.  It has been her preference to stay off of diabetic medications and instead follow diet/exercise lifestyle treatment.   She is up about 10 lbs in weight from 4/2022.    She admits to struggling with a diabetic diet over the past several months and has indulged in frequent white carbs.  Stands at work - cut back to 15 hours.   Exercise:  none.    Sleeping well at night and moods stable.   Had full GI work up with all came back negative.        Exam:  /80 (BP Location: Right arm, Patient Position: Sitting, BP Cuff Size: Large adult)   Pulse 76   Temp 36.2 °C (97.2 °F)   Resp 16   Ht 1.727 m (5' 8\")   Wt 116 kg (255 lb)   SpO2 97%   Gen. appears healthy in no distress   Skin appropriate for sex and age   Neck trachea is midline  Lungs unlabored breathing  Heart regular rate  Neuro gait and station normal   Psych appropriate, calm, interactive, well-groomed      A/P:  \"  1. Type 2 diabetes mellitus without complication, without long-term current use of insulin (HCC)  metFORMIN ER (GLUCOPHAGE XR) 500 MG TABLET SR 24 HR    Comp Metabolic Panel    CBC WITH DIFFERENTIAL    Lipid Profile    HEMOGLOBIN A1C    TSH    MICROALBUMIN CREAT RATIO URINE      2. Morbid (severe) obesity due to excess calories (HCC)        3. Body mass index (BMI) 38.0-38.9, adult        \"  Unfortunately A1c is up to 7.2.  We had a good discussion about the importance of getting her diabetes under control, losing weight, following a diabetic diet and exercising.  I encouraged her to utilize the free gym membership provided to her by [facility] [employer] and to exercise for 30 minutes, 5 days a week even if she has to gradually work up to this.  We discussed avoiding white carbohydrates and following a very low-carb, high-fiber, lean protein diet.  I encouraged her to try to lose 50 pounds over the " next year.  She was started on metformin 500 mg XR once daily at night with dinner.  Discussed potential GI side effects and she will notify me if this is bothersome for her.  Discussed proper foot care and seeing her eye doctor.  Recommend a full updated lab panel and then following up with me in person in 3 months.

## 2022-08-26 DIAGNOSIS — E11.9 TYPE 2 DIABETES MELLITUS WITHOUT COMPLICATION, WITHOUT LONG-TERM CURRENT USE OF INSULIN (HCC): ICD-10-CM

## 2022-08-29 RX ORDER — DULOXETIN HYDROCHLORIDE 60 MG/1
60 CAPSULE, DELAYED RELEASE ORAL
Qty: 90 CAPSULE | Refills: 1 | Status: SHIPPED | OUTPATIENT
Start: 2022-08-29 | End: 2023-03-02 | Stop reason: SDUPTHER

## 2022-08-29 RX ORDER — METFORMIN HYDROCHLORIDE 500 MG/1
500 TABLET, EXTENDED RELEASE ORAL DAILY
Qty: 100 TABLET | Refills: 5 | Status: SHIPPED | OUTPATIENT
Start: 2022-08-29 | End: 2022-11-30

## 2022-09-21 ENCOUNTER — PATIENT MESSAGE (OUTPATIENT)
Dept: MEDICAL GROUP | Facility: LAB | Age: 62
End: 2022-09-21
Payer: MEDICARE

## 2022-09-21 DIAGNOSIS — E11.9 TYPE 2 DIABETES MELLITUS WITHOUT COMPLICATION, WITHOUT LONG-TERM CURRENT USE OF INSULIN (HCC): ICD-10-CM

## 2022-09-22 ENCOUNTER — PATIENT MESSAGE (OUTPATIENT)
Dept: MEDICAL GROUP | Facility: LAB | Age: 62
End: 2022-09-22
Payer: MEDICARE

## 2022-09-22 DIAGNOSIS — E11.9 TYPE 2 DIABETES MELLITUS WITHOUT COMPLICATION, WITHOUT LONG-TERM CURRENT USE OF INSULIN (HCC): ICD-10-CM

## 2022-09-22 RX ORDER — LANCETS 30 GAUGE
EACH MISCELLANEOUS
Qty: 100 EACH | Refills: 3 | Status: SHIPPED
Start: 2022-09-22

## 2022-09-22 RX ORDER — LANCETS 30 GAUGE
EACH MISCELLANEOUS
Qty: 100 EACH | Refills: 3 | Status: SHIPPED | OUTPATIENT
Start: 2022-09-22 | End: 2022-09-22 | Stop reason: SDUPTHER

## 2022-10-14 ENCOUNTER — OFFICE VISIT (OUTPATIENT)
Dept: MEDICAL GROUP | Facility: LAB | Age: 62
End: 2022-10-14
Payer: MEDICARE

## 2022-10-14 VITALS
BODY MASS INDEX: 37.44 KG/M2 | HEIGHT: 68 IN | WEIGHT: 247 LBS | OXYGEN SATURATION: 98 % | TEMPERATURE: 98.5 F | DIASTOLIC BLOOD PRESSURE: 70 MMHG | RESPIRATION RATE: 16 BRPM | SYSTOLIC BLOOD PRESSURE: 140 MMHG | HEART RATE: 72 BPM

## 2022-10-14 DIAGNOSIS — M25.571 CHRONIC PAIN OF BOTH ANKLES: ICD-10-CM

## 2022-10-14 DIAGNOSIS — J06.9 UPPER RESPIRATORY TRACT INFECTION, UNSPECIFIED TYPE: ICD-10-CM

## 2022-10-14 DIAGNOSIS — G89.29 CHRONIC PAIN OF BOTH ANKLES: ICD-10-CM

## 2022-10-14 DIAGNOSIS — R05.1 ACUTE COUGH: ICD-10-CM

## 2022-10-14 DIAGNOSIS — M25.572 CHRONIC PAIN OF BOTH ANKLES: ICD-10-CM

## 2022-10-14 LAB
EXTERNAL QUALITY CONTROL: NORMAL
FLUAV+FLUBV AG SPEC QL IA: NORMAL
INT CON NEG: NEGATIVE
INT CON NEG: NEGATIVE
INT CON POS: POSITIVE
INT CON POS: POSITIVE
SARS-COV+SARS-COV-2 AG RESP QL IA.RAPID: NEGATIVE

## 2022-10-14 PROCEDURE — 87804 INFLUENZA ASSAY W/OPTIC: CPT | Performed by: FAMILY MEDICINE

## 2022-10-14 PROCEDURE — 87426 SARSCOV CORONAVIRUS AG IA: CPT | Performed by: FAMILY MEDICINE

## 2022-10-14 PROCEDURE — 99214 OFFICE O/P EST MOD 30 MIN: CPT | Mod: CS | Performed by: FAMILY MEDICINE

## 2022-10-14 RX ORDER — MELOXICAM 15 MG/1
15 TABLET ORAL DAILY
Qty: 15 TABLET | Refills: 0 | Status: SHIPPED | OUTPATIENT
Start: 2022-10-14 | End: 2022-11-30

## 2022-10-14 RX ORDER — AMOXICILLIN AND CLAVULANATE POTASSIUM 875; 125 MG/1; MG/1
1 TABLET, FILM COATED ORAL 2 TIMES DAILY
Qty: 14 TABLET | Refills: 0 | Status: SHIPPED | OUTPATIENT
Start: 2022-10-14 | End: 2022-11-30

## 2022-10-14 ASSESSMENT — FIBROSIS 4 INDEX: FIB4 SCORE: 0.8

## 2022-10-14 NOTE — PROGRESS NOTES
Chief Complaint:   Chief Complaint   Patient presents with    Pharyngitis    Body Aches       HPI: Established patient  Amy Denson is a 62 y.o. female who presents for evaluation of upper respite tract symptoms.  And chronic bilateral ankle pain    1. Chronic pain of both ankles  Chronic, ongoing.  No recent injury or fall.  Has been experiencing more pain in her ankle both sides right more than the left.  Denies swelling.    2. Upper respiratory tract infection, unspecified / Acute cough      Symptoms started around 3 to 4 days ago, with fatigue tiredness body ache nasal congestion, cough with productive sputum with streaks of blood.  Denies shortness of breath or chest pain.  Denies fever.  History of contact with sick people with possible COVID.  Denies GI symptoms at this time.  Past medical history, family history, social history and medications reviewed and updated in the record.  Today  Current medications, problem list and allergies reviewed in [facility] today      Health maintenance topics are reviewed and updated.    Patient Active Problem List    Diagnosis Date Noted    Morbid (severe) obesity due to excess calories ([facility]) 08/24/2022    Achilles tendinitis of both lower extremities 02/17/2022    Type 2 diabetes mellitus without complication, without long-term current use of insulin ([facility]) 12/14/2017    MDD (major depressive disorder), recurrent, in full remission ([facility]) 01/05/2017    Thyroid nodule - neg biopsy 2014.  US 2018.   07/08/2015    Abnormal vaginal bleeding 07/08/2015    Obesity (BMI 30-39.9) 11/30/2011     Family History   Problem Relation Age of Onset    Cancer Mother         lung    Cancer Father         prostate    Dementia Father     Alcohol abuse Brother     Heart Disease Paternal Uncle     Depression Maternal Aunt     Bipolar disorder Cousin     Diabetes Neg Hx     Stroke Neg Hx      Social History     Socioeconomic History    Marital status:      Spouse name: Not on file     Number of children: Not on file    Years of education: Not on file    Highest education level: Associate degree: academic program   Occupational History    Not on file   Tobacco Use    Smoking status: Former     Types: Cigarettes     Quit date: 2010     Years since quittin.6    Smokeless tobacco: Never   Vaping Use    Vaping Use: Never used   Substance and Sexual Activity    Alcohol use: Yes     Comment: infrequently    Drug use: Yes     Comment: CBD balm for ankles    Sexual activity: Yes     Partners: Male     Comment: , 1 child (daughter - 4 grandkids),  not working   Other Topics Concern    Not on file   Social History Narrative    ** Merged History Encounter **         ** Merged History Encounter **          Social Determinants of Health     Financial Resource Strain: Low Risk     Difficulty of Paying Living Expenses: Not hard at all   Food Insecurity: No Food Insecurity    Worried About Running Out of Food in the Last Year: Never true    Ran Out of Food in the Last Year: Never true   Transportation Needs: No Transportation Needs    Lack of Transportation (Medical): No    Lack of Transportation (Non-Medical): No   Physical Activity: Insufficiently Active    Days of Exercise per Week: 1 day    Minutes of Exercise per Session: 20 min   Stress: No Stress Concern Present    Feeling of Stress : Not at all   Social Connections: Moderately Integrated    Frequency of Communication with Friends and Family: Twice a week    Frequency of Social Gatherings with Friends and Family: Once a week    Attends [employer] Services: 1 to 4 times per year    Active Member of Clubs or Organizations: No    Attends Club or Organization Meetings: Never    Marital Status:    Intimate Partner Violence: Not on file   Housing Stability: Low Risk     Unable to Pay for Housing in the Last Year: No    Number of Places Lived in the Last Year: 1    Unstable Housing in the Last Year: No       Current Outpatient Medications  "  Medication Sig Dispense Refill    meloxicam (MOBIC) 15 MG tablet Take 1 Tablet by mouth every day. 15 Tablet 0    amoxicillin-clavulanate (AUGMENTIN) 875-125 MG Tab Take 1 Tablet by mouth 2 times a day. 14 Tablet 0    Blood Glucose Test Strips Test strips order: Test strips for meter covered by insurance. Sig: use once daily in the morning prior to eating and prn ssx high or low sugar #100 RF x 3 100 Strip 5    Lancets Lancets order: Lancets for meter covered by insurance . Sig: use once daily in the morning and prn ssx high or low sugar. #100 RF x 3 100 Each 3    metFORMIN ER (GLUCOPHAGE XR) 500 MG TABLET SR 24 HR Take 1 Tablet by mouth every day. At night with dinner 100 Tablet 5    DULoxetine (CYMBALTA) 60 MG Cap DR Particles delayed-release capsule TAKE 1 CAPSULE BY MOUTH EVERY DAY 90 Capsule 1    CLENPIQ 10-3.5-12 MG-GM -GM/160ML Solution USE 10 MG-3.5 GRAM -12 GRAM/160 ML PER INSTRUCTIONS PROVIDED TO PATIENT.      triamcinolone acetonide (KENALOG) 0.025 % Cream Apply 1 Application to affected area(s) 2 times a day. 45 g 2    NON SPECIFIED Toilet seat lift.   Dx:  Achilles tendonitis 1 Each 0     No current facility-administered medications for this visit.         Review Of Systems  As documented in HPI above  PHYSICAL EXAMINATION:    BP (!) 140/70 (BP Location: Right arm, Patient Position: Sitting, BP Cuff Size: Adult)   Pulse 72   Temp 36.9 °C (98.5 °F) (Temporal)   Resp 16   Ht 1.727 m (5' 8\")   Wt 112 kg (247 lb)   LMP 12/27/2016   SpO2 98%   BMI 37.56 kg/m²   Gen.: Well-developed, well-nourished, no apparent distress, pleasant and cooperative with the examination  HEENT: Normocephalic/atraumatic, sinuses nontender with palpation, TMs clear, nares patent with pink mucosa and clear rhinorrhea, oropharynx clear  Neck: No JVD or bruits, no adenopathy  Cor: Regular rate and rhythm without murmur gallop or rub  Lungs: Clear to auscultation with equal breath sounds bilaterally. No wheezes, " rhonchi.  Abdomen: Soft nontender without hepatosplenomegaly or masses appreciated, normoactive bowel sounds  Extremities: No cyanosis, clubbing or edema        ASSESSMENT/Plan:  1. Chronic pain of both ankles  Chronic, with acute exacerbation, advised to use meloxicam, follow-up with PCP for further evaluation.  Will possibly need x-rays.  meloxicam (MOBIC) 15 MG tablet      2. Upper respiratory tract infection, unspecified type  New concern, negative COVID and flu test at the office today, advised to do a chest x-ray because of the bloody sputum streak, continue supportive care only with Tylenol every 6 hours and pushing fluids for 5 days if not improved to start antibiotics.  Follow-up with PCP for further assessment if not improved  POCT SARS-COV Antigen HARLEY (Symptomatic Only)    POCT Influenza A/B      3. Acute cough  DX-CHEST-2 VIEWS    amoxicillin-clavulanate (AUGMENTIN) 875-125 MG Tab

## 2022-11-15 ENCOUNTER — TELEPHONE (OUTPATIENT)
Dept: MEDICAL GROUP | Facility: LAB | Age: 62
End: 2022-11-15
Payer: MEDICARE

## 2022-11-16 NOTE — TELEPHONE ENCOUNTER
I spoke with Amy and she said she is going to find out when she had last eye exam and get us records. I gave her fax number to have faxed to us.

## 2022-11-21 ENCOUNTER — HOSPITAL ENCOUNTER (OUTPATIENT)
Dept: LAB | Facility: MEDICAL CENTER | Age: 62
End: 2022-11-21
Attending: NURSE PRACTITIONER
Payer: MEDICARE

## 2022-11-21 DIAGNOSIS — E11.9 TYPE 2 DIABETES MELLITUS WITHOUT COMPLICATION, WITHOUT LONG-TERM CURRENT USE OF INSULIN (HCC): ICD-10-CM

## 2022-11-21 LAB
ALBUMIN SERPL BCP-MCNC: 4.4 G/DL (ref 3.2–4.9)
ALBUMIN/GLOB SERPL: 1.3 G/DL
ALP SERPL-CCNC: 73 U/L (ref 30–99)
ALT SERPL-CCNC: 19 U/L (ref 2–50)
ANION GAP SERPL CALC-SCNC: 11 MMOL/L (ref 7–16)
AST SERPL-CCNC: 20 U/L (ref 12–45)
BASOPHILS # BLD AUTO: 1.1 % (ref 0–1.8)
BASOPHILS # BLD: 0.07 K/UL (ref 0–0.12)
BILIRUB SERPL-MCNC: 0.6 MG/DL (ref 0.1–1.5)
BUN SERPL-MCNC: 17 MG/DL (ref 8–22)
CALCIUM SERPL-MCNC: 9.4 MG/DL (ref 8.5–10.5)
CHLORIDE SERPL-SCNC: 101 MMOL/L (ref 96–112)
CHOLEST SERPL-MCNC: 169 MG/DL (ref 100–199)
CO2 SERPL-SCNC: 24 MMOL/L (ref 20–33)
CREAT SERPL-MCNC: 0.69 MG/DL (ref 0.5–1.4)
CREAT UR-MCNC: 107.19 MG/DL
EOSINOPHIL # BLD AUTO: 0.29 K/UL (ref 0–0.51)
EOSINOPHIL NFR BLD: 4.6 % (ref 0–6.9)
ERYTHROCYTE [DISTWIDTH] IN BLOOD BY AUTOMATED COUNT: 44.2 FL (ref 35.9–50)
EST. AVERAGE GLUCOSE BLD GHB EST-MCNC: 123 MG/DL
FASTING STATUS PATIENT QL REPORTED: NORMAL
GFR SERPLBLD CREATININE-BSD FMLA CKD-EPI: 99 ML/MIN/1.73 M 2
GLOBULIN SER CALC-MCNC: 3.3 G/DL (ref 1.9–3.5)
GLUCOSE SERPL-MCNC: 122 MG/DL (ref 65–99)
HBA1C MFR BLD: 5.9 % (ref 4–5.6)
HCT VFR BLD AUTO: 50 % (ref 37–47)
HDLC SERPL-MCNC: 41 MG/DL
HGB BLD-MCNC: 15.9 G/DL (ref 12–16)
IMM GRANULOCYTES # BLD AUTO: 0.02 K/UL (ref 0–0.11)
IMM GRANULOCYTES NFR BLD AUTO: 0.3 % (ref 0–0.9)
LDLC SERPL CALC-MCNC: 113 MG/DL
LYMPHOCYTES # BLD AUTO: 1.88 K/UL (ref 1–4.8)
LYMPHOCYTES NFR BLD: 29.5 % (ref 22–41)
MCH RBC QN AUTO: 30.3 PG (ref 27–33)
MCHC RBC AUTO-ENTMCNC: 31.8 G/DL (ref 33.6–35)
MCV RBC AUTO: 95.4 FL (ref 81.4–97.8)
MICROALBUMIN UR-MCNC: <1.2 MG/DL
MICROALBUMIN/CREAT UR: NORMAL MG/G (ref 0–30)
MONOCYTES # BLD AUTO: 0.46 K/UL (ref 0–0.85)
MONOCYTES NFR BLD AUTO: 7.2 % (ref 0–13.4)
NEUTROPHILS # BLD AUTO: 3.65 K/UL (ref 2–7.15)
NEUTROPHILS NFR BLD: 57.3 % (ref 44–72)
NRBC # BLD AUTO: 0 K/UL
NRBC BLD-RTO: 0 /100 WBC
PLATELET # BLD AUTO: 235 K/UL (ref 164–446)
PMV BLD AUTO: 9.1 FL (ref 9–12.9)
POTASSIUM SERPL-SCNC: 4.1 MMOL/L (ref 3.6–5.5)
PROT SERPL-MCNC: 7.7 G/DL (ref 6–8.2)
RBC # BLD AUTO: 5.24 M/UL (ref 4.2–5.4)
SODIUM SERPL-SCNC: 136 MMOL/L (ref 135–145)
TRIGL SERPL-MCNC: 77 MG/DL (ref 0–149)
TSH SERPL DL<=0.005 MIU/L-ACNC: 1.57 UIU/ML (ref 0.38–5.33)
WBC # BLD AUTO: 6.4 K/UL (ref 4.8–10.8)

## 2022-11-21 PROCEDURE — 85025 COMPLETE CBC W/AUTO DIFF WBC: CPT

## 2022-11-21 PROCEDURE — 82570 ASSAY OF URINE CREATININE: CPT

## 2022-11-21 PROCEDURE — 83036 HEMOGLOBIN GLYCOSYLATED A1C: CPT

## 2022-11-21 PROCEDURE — 82043 UR ALBUMIN QUANTITATIVE: CPT

## 2022-11-21 PROCEDURE — 80061 LIPID PANEL: CPT

## 2022-11-21 PROCEDURE — 36415 COLL VENOUS BLD VENIPUNCTURE: CPT

## 2022-11-21 PROCEDURE — 84443 ASSAY THYROID STIM HORMONE: CPT

## 2022-11-21 PROCEDURE — 80053 COMPREHEN METABOLIC PANEL: CPT

## 2022-11-29 ENCOUNTER — TELEPHONE (OUTPATIENT)
Dept: MEDICAL GROUP | Facility: LAB | Age: 62
End: 2022-11-29
Payer: MEDICARE

## 2022-11-29 NOTE — TELEPHONE ENCOUNTER
ESTABLISHED PATIENT PRE-VISIT PLANNING     Patient was NOT contacted to complete PVP.     Note: Patient will not be contacted if there is no indication to call.     1.  Reviewed notes from the last few office visits within the medical group: Yes    2.  If any orders were placed at last visit or intended to be done for this visit (i.e. 6 mos follow-up), do we have Results/Consult Notes?           Labs - Labs ordered, completed on 11/21/22 and results are in chart.  Note: If patient appointment is for lab review and patient did not complete labs, check with provider if OK to reschedule patient until labs completed.         Imaging - Imaging ordered, NOT completed. Patient advised to complete prior to next appointment.         Referrals - No referrals were ordered at last office visit.    3. Is this appointment scheduled as a Hospital Follow-Up? No    4.  Immunizations were updated in Epic using Reconcile Outside Information activity? Yes    5.  Patient is due for the following Health Maintenance Topics:   Health Maintenance Due   Topic Date Due    IMM HEP B VACCINE (1 of 3 - 3-dose series) Never done    DIABETES MONOFILAMENT / LE EXAM  Never done    IMM ZOSTER VACCINES (1 of 2) Never done    RETINAL SCREENING  10/06/2017    COVID-19 Vaccine (4 - Booster for Pfizer series) 05/03/2022    IMM INFLUENZA (1) Never done   6.  AHA (Pulse8) form printed for Provider? N/A

## 2022-11-30 ENCOUNTER — OFFICE VISIT (OUTPATIENT)
Dept: MEDICAL GROUP | Facility: LAB | Age: 62
End: 2022-11-30
Payer: MEDICARE

## 2022-11-30 VITALS
HEART RATE: 90 BPM | OXYGEN SATURATION: 93 % | HEIGHT: 68 IN | RESPIRATION RATE: 16 BRPM | SYSTOLIC BLOOD PRESSURE: 120 MMHG | TEMPERATURE: 99.5 F | DIASTOLIC BLOOD PRESSURE: 60 MMHG | WEIGHT: 231 LBS | BODY MASS INDEX: 35.01 KG/M2

## 2022-11-30 DIAGNOSIS — B35.1 TOENAIL FUNGUS: ICD-10-CM

## 2022-11-30 DIAGNOSIS — E11.9 TYPE 2 DIABETES MELLITUS WITHOUT COMPLICATION, WITHOUT LONG-TERM CURRENT USE OF INSULIN (HCC): ICD-10-CM

## 2022-11-30 PROCEDURE — 99214 OFFICE O/P EST MOD 30 MIN: CPT | Performed by: NURSE PRACTITIONER

## 2022-11-30 RX ORDER — CICLOPIROX 80 MG/ML
SOLUTION TOPICAL
Qty: 6 ML | Refills: 11 | Status: SHIPPED | OUTPATIENT
Start: 2022-11-30 | End: 2023-11-29

## 2022-11-30 ASSESSMENT — FIBROSIS 4 INDEX: FIB4 SCORE: 1.17

## 2022-11-30 NOTE — PROGRESS NOTES
"Chief Complaint   Patient presents with    Nail Problem     Big toenail problem       HPI:  Amy is a 62-year-old established female here with a couple of things:  #1-type 2 diabetes mellitus without complication, without long-term current use of insulin ([facility])  Chronic issue.  Metformin caused increased heart rate therefore she stopped it after a few months.  Has been exercising and eating healthier.  House work is her main exercise.  Has cut out white rice and is eating more nuts / berries / vegetables / protein.  Denies foot burning / tingling.  A1c dropped down to 5.9 on labs done prior to arrival today, was previously at 7.2%.    #2-has noticed thickening and elevation of both great toenails over the past few months.  Had a pedicure and was told to come here for possible fungal treatment.    Exam:  /60 (BP Location: Left arm, Patient Position: Sitting, BP Cuff Size: Large adult)   Pulse 90   Temp 37.5 °C (99.5 °F)   Resp 16   Ht 1.727 m (5' 8\")   Wt 105 kg (231 lb)   SpO2 93%   Gen. appears healthy in no distress   Skin appropriate for sex and age   Neck trachea is midline  Lungs unlabored breathing  Heart regular rate  Neuro gait and station normal   Psych appropriate, calm, interactive, well-groomed  Monofilament testing with a 10 gram force: sensation intact: intact bilaterally  Visual Inspection: Feet without maceration, ulcers, fissures.  She does have thickened great toenails bilaterally in terms of the distal aspect and underlying appearance as both nails are slightly raised off of the bed.  Both great toenails are painted with pink toenail polish.  Pedal pulses: intact bilaterally    A/P:  \"  1. Type 2 diabetes mellitus without complication, without long-term current use of insulin ([facility])        2. Toenail fungus  ciclopirox (PENLAC) 8 % solution        A1c excellent and down into a prediabetic range.  She prefers to remain off all medications for type 2 diabetes and instead treat with " diet/exercise.  Discussed proper foot care.  Eye appointment is up-to-date and she brought a copy of her retinal exam today.  Discussed the importance of strictly following a diabetic diet.  Follow-up 6 months for A1c in the office.    Discussed treatment of nail fungus.  Encouraged her to remove the polish from both great toenails and start using Penlac topically daily for the next 9 months.  Will recheck in 6 months, sooner with worsening.

## 2022-11-30 NOTE — ASSESSMENT & PLAN NOTE
Chronic issue.  Metformin caused increased heart rate therefore she stopped it after a few months.  Has been exercising and eating healthier.  House work is her main exercise.  Has cut out white rice and is eating more nuts / berries / vegetables / protein.  Denies foot burning / tingling.

## 2023-02-13 ENCOUNTER — HOSPITAL ENCOUNTER (OUTPATIENT)
Dept: RADIOLOGY | Facility: MEDICAL CENTER | Age: 63
End: 2023-02-13
Attending: COLON & RECTAL SURGERY
Payer: MEDICARE

## 2023-02-13 DIAGNOSIS — R11.2 NAUSEA AND VOMITING, UNSPECIFIED VOMITING TYPE: ICD-10-CM

## 2023-02-13 DIAGNOSIS — R19.7 DIARRHEA, UNSPECIFIED TYPE: ICD-10-CM

## 2023-02-13 DIAGNOSIS — R10.11 ABDOMINAL PAIN, RIGHT UPPER QUADRANT: ICD-10-CM

## 2023-02-13 PROCEDURE — 76700 US EXAM ABDOM COMPLETE: CPT

## 2023-03-02 RX ORDER — DULOXETIN HYDROCHLORIDE 60 MG/1
60 CAPSULE, DELAYED RELEASE ORAL
Qty: 90 CAPSULE | Refills: 1 | Status: SHIPPED | OUTPATIENT
Start: 2023-03-02 | End: 2023-11-19

## 2023-03-02 NOTE — TELEPHONE ENCOUNTER
Received request via: Patient    Was the patient seen in the last year in this department? Yes  11/30/22  Does the patient have an active prescription (recently filled or refills available) for medication(s) requested? No    Does the patient have jail Plus and need 100 day supply (blood pressure, diabetes and cholesterol meds only)? Medication is not for cholesterol, blood pressure or diabetes

## 2023-03-17 ENCOUNTER — APPOINTMENT (OUTPATIENT)
Dept: ADMISSIONS | Facility: MEDICAL CENTER | Age: 63
End: 2023-03-17
Attending: COLON & RECTAL SURGERY
Payer: MEDICARE

## 2023-03-27 ENCOUNTER — PRE-ADMISSION TESTING (OUTPATIENT)
Dept: ADMISSIONS | Facility: MEDICAL CENTER | Age: 63
End: 2023-03-27
Attending: COLON & RECTAL SURGERY
Payer: MEDICARE

## 2023-03-27 RX ORDER — OXYBUTYNIN CHLORIDE 5 MG/1
5 TABLET ORAL 2 TIMES DAILY
COMMUNITY
Start: 2023-03-10 | End: 2023-05-30

## 2023-03-31 ENCOUNTER — PRE-ADMISSION TESTING (OUTPATIENT)
Dept: ADMISSIONS | Facility: MEDICAL CENTER | Age: 63
End: 2023-03-31
Attending: COLON & RECTAL SURGERY
Payer: MEDICARE

## 2023-03-31 DIAGNOSIS — Z01.810 PRE-OPERATIVE CARDIOVASCULAR EXAMINATION: ICD-10-CM

## 2023-03-31 DIAGNOSIS — Z01.812 PRE-OPERATIVE LABORATORY EXAMINATION: ICD-10-CM

## 2023-03-31 LAB
ANION GAP SERPL CALC-SCNC: 11 MMOL/L (ref 7–16)
BUN SERPL-MCNC: 16 MG/DL (ref 8–22)
CALCIUM SERPL-MCNC: 9.3 MG/DL (ref 8.5–10.5)
CHLORIDE SERPL-SCNC: 106 MMOL/L (ref 96–112)
CO2 SERPL-SCNC: 23 MMOL/L (ref 20–33)
CREAT SERPL-MCNC: 0.77 MG/DL (ref 0.5–1.4)
EKG IMPRESSION: NORMAL
ERYTHROCYTE [DISTWIDTH] IN BLOOD BY AUTOMATED COUNT: 45.3 FL (ref 35.9–50)
EST. AVERAGE GLUCOSE BLD GHB EST-MCNC: 131 MG/DL
GFR SERPLBLD CREATININE-BSD FMLA CKD-EPI: 88 ML/MIN/1.73 M 2
GLUCOSE SERPL-MCNC: 181 MG/DL (ref 65–99)
HBA1C MFR BLD: 6.2 % (ref 4–5.6)
HCT VFR BLD AUTO: 48.6 % (ref 37–47)
HGB BLD-MCNC: 15.6 G/DL (ref 12–16)
MCH RBC QN AUTO: 30.1 PG (ref 27–33)
MCHC RBC AUTO-ENTMCNC: 32.1 G/DL (ref 33.6–35)
MCV RBC AUTO: 93.8 FL (ref 81.4–97.8)
PLATELET # BLD AUTO: 230 K/UL (ref 164–446)
PMV BLD AUTO: 8.7 FL (ref 9–12.9)
POTASSIUM SERPL-SCNC: 4.3 MMOL/L (ref 3.6–5.5)
RBC # BLD AUTO: 5.18 M/UL (ref 4.2–5.4)
SODIUM SERPL-SCNC: 140 MMOL/L (ref 135–145)
WBC # BLD AUTO: 7.9 K/UL (ref 4.8–10.8)

## 2023-03-31 PROCEDURE — 80048 BASIC METABOLIC PNL TOTAL CA: CPT

## 2023-03-31 PROCEDURE — 85027 COMPLETE CBC AUTOMATED: CPT

## 2023-03-31 PROCEDURE — 93010 ELECTROCARDIOGRAM REPORT: CPT | Performed by: INTERNAL MEDICINE

## 2023-03-31 PROCEDURE — 36415 COLL VENOUS BLD VENIPUNCTURE: CPT

## 2023-03-31 PROCEDURE — 93005 ELECTROCARDIOGRAM TRACING: CPT

## 2023-03-31 PROCEDURE — 83036 HEMOGLOBIN GLYCOSYLATED A1C: CPT

## 2023-04-04 ENCOUNTER — ANESTHESIA EVENT (OUTPATIENT)
Dept: SURGERY | Facility: MEDICAL CENTER | Age: 63
End: 2023-04-04
Payer: MEDICARE

## 2023-04-05 ENCOUNTER — HOSPITAL ENCOUNTER (OUTPATIENT)
Facility: MEDICAL CENTER | Age: 63
End: 2023-04-05
Attending: COLON & RECTAL SURGERY | Admitting: COLON & RECTAL SURGERY
Payer: MEDICARE

## 2023-04-05 ENCOUNTER — ANESTHESIA (OUTPATIENT)
Dept: SURGERY | Facility: MEDICAL CENTER | Age: 63
End: 2023-04-05
Payer: MEDICARE

## 2023-04-05 VITALS
SYSTOLIC BLOOD PRESSURE: 140 MMHG | DIASTOLIC BLOOD PRESSURE: 70 MMHG | BODY MASS INDEX: 36.32 KG/M2 | RESPIRATION RATE: 16 BRPM | OXYGEN SATURATION: 94 % | HEIGHT: 68 IN | WEIGHT: 239.64 LBS | TEMPERATURE: 97.5 F | HEART RATE: 70 BPM

## 2023-04-05 LAB
GLUCOSE BLD STRIP.AUTO-MCNC: 104 MG/DL (ref 65–99)
PATHOLOGY CONSULT NOTE: NORMAL

## 2023-04-05 PROCEDURE — A9270 NON-COVERED ITEM OR SERVICE: HCPCS | Performed by: ANESTHESIOLOGY

## 2023-04-05 PROCEDURE — 700105 HCHG RX REV CODE 258: Performed by: COLON & RECTAL SURGERY

## 2023-04-05 PROCEDURE — 700101 HCHG RX REV CODE 250: Performed by: ANESTHESIOLOGY

## 2023-04-05 PROCEDURE — 700111 HCHG RX REV CODE 636 W/ 250 OVERRIDE (IP): Performed by: ANESTHESIOLOGY

## 2023-04-05 PROCEDURE — 88304 TISSUE EXAM BY PATHOLOGIST: CPT

## 2023-04-05 PROCEDURE — 160025 RECOVERY II MINUTES (STATS): Performed by: COLON & RECTAL SURGERY

## 2023-04-05 PROCEDURE — 160041 HCHG SURGERY MINUTES - EA ADDL 1 MIN LEVEL 4: Performed by: COLON & RECTAL SURGERY

## 2023-04-05 PROCEDURE — 700111 HCHG RX REV CODE 636 W/ 250 OVERRIDE (IP)

## 2023-04-05 PROCEDURE — 00790 ANES IPER UPR ABD NOS: CPT | Performed by: ANESTHESIOLOGY

## 2023-04-05 PROCEDURE — 160035 HCHG PACU - 1ST 60 MINS PHASE I: Performed by: COLON & RECTAL SURGERY

## 2023-04-05 PROCEDURE — 700101 HCHG RX REV CODE 250: Performed by: COLON & RECTAL SURGERY

## 2023-04-05 PROCEDURE — 160029 HCHG SURGERY MINUTES - 1ST 30 MINS LEVEL 4: Performed by: COLON & RECTAL SURGERY

## 2023-04-05 PROCEDURE — 160002 HCHG RECOVERY MINUTES (STAT): Performed by: COLON & RECTAL SURGERY

## 2023-04-05 PROCEDURE — 82962 GLUCOSE BLOOD TEST: CPT

## 2023-04-05 PROCEDURE — 160009 HCHG ANES TIME/MIN: Performed by: COLON & RECTAL SURGERY

## 2023-04-05 PROCEDURE — 160046 HCHG PACU - 1ST 60 MINS PHASE II: Performed by: COLON & RECTAL SURGERY

## 2023-04-05 PROCEDURE — 160048 HCHG OR STATISTICAL LEVEL 1-5: Performed by: COLON & RECTAL SURGERY

## 2023-04-05 PROCEDURE — 700102 HCHG RX REV CODE 250 W/ 637 OVERRIDE(OP): Performed by: ANESTHESIOLOGY

## 2023-04-05 RX ORDER — LIDOCAINE HYDROCHLORIDE 10 MG/ML
INJECTION, SOLUTION EPIDURAL; INFILTRATION; INTRACAUDAL; PERINEURAL
Status: COMPLETED
Start: 2023-04-05 | End: 2023-04-05

## 2023-04-05 RX ORDER — OXYCODONE HCL 5 MG/5 ML
10 SOLUTION, ORAL ORAL
Status: COMPLETED | OUTPATIENT
Start: 2023-04-05 | End: 2023-04-05

## 2023-04-05 RX ORDER — ONDANSETRON 2 MG/ML
4 INJECTION INTRAMUSCULAR; INTRAVENOUS
Status: DISCONTINUED | OUTPATIENT
Start: 2023-04-05 | End: 2023-04-05 | Stop reason: HOSPADM

## 2023-04-05 RX ORDER — BUPIVACAINE HYDROCHLORIDE AND EPINEPHRINE 5; 5 MG/ML; UG/ML
INJECTION, SOLUTION EPIDURAL; INTRACAUDAL; PERINEURAL
Status: DISCONTINUED | OUTPATIENT
Start: 2023-04-05 | End: 2023-04-05 | Stop reason: HOSPADM

## 2023-04-05 RX ORDER — ACETAMINOPHEN 500 MG
1000 TABLET ORAL ONCE
Status: COMPLETED | OUTPATIENT
Start: 2023-04-05 | End: 2023-04-05

## 2023-04-05 RX ORDER — HALOPERIDOL 5 MG/ML
1 INJECTION INTRAMUSCULAR
Status: DISCONTINUED | OUTPATIENT
Start: 2023-04-05 | End: 2023-04-05 | Stop reason: HOSPADM

## 2023-04-05 RX ORDER — LIDOCAINE HYDROCHLORIDE 20 MG/ML
INJECTION, SOLUTION EPIDURAL; INFILTRATION; INTRACAUDAL; PERINEURAL PRN
Status: DISCONTINUED | OUTPATIENT
Start: 2023-04-05 | End: 2023-04-05 | Stop reason: SURG

## 2023-04-05 RX ORDER — SODIUM CHLORIDE, SODIUM LACTATE, POTASSIUM CHLORIDE, CALCIUM CHLORIDE 600; 310; 30; 20 MG/100ML; MG/100ML; MG/100ML; MG/100ML
INJECTION, SOLUTION INTRAVENOUS CONTINUOUS
Status: DISCONTINUED | OUTPATIENT
Start: 2023-04-05 | End: 2023-04-05 | Stop reason: HOSPADM

## 2023-04-05 RX ORDER — HYDROMORPHONE HYDROCHLORIDE 1 MG/ML
0.1 INJECTION, SOLUTION INTRAMUSCULAR; INTRAVENOUS; SUBCUTANEOUS
Status: DISCONTINUED | OUTPATIENT
Start: 2023-04-05 | End: 2023-04-05 | Stop reason: HOSPADM

## 2023-04-05 RX ORDER — HYDROMORPHONE HYDROCHLORIDE 1 MG/ML
0.2 INJECTION, SOLUTION INTRAMUSCULAR; INTRAVENOUS; SUBCUTANEOUS
Status: DISCONTINUED | OUTPATIENT
Start: 2023-04-05 | End: 2023-04-05 | Stop reason: HOSPADM

## 2023-04-05 RX ORDER — MIDAZOLAM HYDROCHLORIDE 1 MG/ML
INJECTION INTRAMUSCULAR; INTRAVENOUS PRN
Status: DISCONTINUED | OUTPATIENT
Start: 2023-04-05 | End: 2023-04-05 | Stop reason: SURG

## 2023-04-05 RX ORDER — EPHEDRINE SULFATE 50 MG/ML
INJECTION, SOLUTION INTRAVENOUS PRN
Status: DISCONTINUED | OUTPATIENT
Start: 2023-04-05 | End: 2023-04-05 | Stop reason: SURG

## 2023-04-05 RX ORDER — HYDRALAZINE HYDROCHLORIDE 20 MG/ML
5 INJECTION INTRAMUSCULAR; INTRAVENOUS
Status: DISCONTINUED | OUTPATIENT
Start: 2023-04-05 | End: 2023-04-05 | Stop reason: HOSPADM

## 2023-04-05 RX ORDER — ROCURONIUM BROMIDE 10 MG/ML
INJECTION, SOLUTION INTRAVENOUS PRN
Status: DISCONTINUED | OUTPATIENT
Start: 2023-04-05 | End: 2023-04-05 | Stop reason: SURG

## 2023-04-05 RX ORDER — OXYCODONE HCL 5 MG/5 ML
5 SOLUTION, ORAL ORAL
Status: COMPLETED | OUTPATIENT
Start: 2023-04-05 | End: 2023-04-05

## 2023-04-05 RX ORDER — IPRATROPIUM BROMIDE AND ALBUTEROL SULFATE 2.5; .5 MG/3ML; MG/3ML
3 SOLUTION RESPIRATORY (INHALATION)
Status: DISCONTINUED | OUTPATIENT
Start: 2023-04-05 | End: 2023-04-05 | Stop reason: HOSPADM

## 2023-04-05 RX ORDER — DEXMEDETOMIDINE HYDROCHLORIDE 100 UG/ML
INJECTION, SOLUTION INTRAVENOUS PRN
Status: DISCONTINUED | OUTPATIENT
Start: 2023-04-05 | End: 2023-04-05 | Stop reason: SURG

## 2023-04-05 RX ORDER — DEXAMETHASONE SODIUM PHOSPHATE 4 MG/ML
INJECTION, SOLUTION INTRA-ARTICULAR; INTRALESIONAL; INTRAMUSCULAR; INTRAVENOUS; SOFT TISSUE PRN
Status: DISCONTINUED | OUTPATIENT
Start: 2023-04-05 | End: 2023-04-05 | Stop reason: SURG

## 2023-04-05 RX ORDER — ONDANSETRON 2 MG/ML
INJECTION INTRAMUSCULAR; INTRAVENOUS PRN
Status: DISCONTINUED | OUTPATIENT
Start: 2023-04-05 | End: 2023-04-05 | Stop reason: SURG

## 2023-04-05 RX ORDER — HYDROMORPHONE HYDROCHLORIDE 1 MG/ML
0.4 INJECTION, SOLUTION INTRAMUSCULAR; INTRAVENOUS; SUBCUTANEOUS
Status: DISCONTINUED | OUTPATIENT
Start: 2023-04-05 | End: 2023-04-05 | Stop reason: HOSPADM

## 2023-04-05 RX ORDER — DIPHENHYDRAMINE HYDROCHLORIDE 50 MG/ML
12.5 INJECTION INTRAMUSCULAR; INTRAVENOUS
Status: DISCONTINUED | OUTPATIENT
Start: 2023-04-05 | End: 2023-04-05 | Stop reason: HOSPADM

## 2023-04-05 RX ORDER — CEFAZOLIN SODIUM 1 G/3ML
INJECTION, POWDER, FOR SOLUTION INTRAMUSCULAR; INTRAVENOUS PRN
Status: DISCONTINUED | OUTPATIENT
Start: 2023-04-05 | End: 2023-04-05 | Stop reason: SURG

## 2023-04-05 RX ORDER — MEPERIDINE HYDROCHLORIDE 25 MG/ML
12.5 INJECTION INTRAMUSCULAR; INTRAVENOUS; SUBCUTANEOUS
Status: DISCONTINUED | OUTPATIENT
Start: 2023-04-05 | End: 2023-04-05 | Stop reason: HOSPADM

## 2023-04-05 RX ORDER — SCOLOPAMINE TRANSDERMAL SYSTEM 1 MG/1
1 PATCH, EXTENDED RELEASE TRANSDERMAL
Status: DISCONTINUED | OUTPATIENT
Start: 2023-04-05 | End: 2023-04-05 | Stop reason: HOSPADM

## 2023-04-05 RX ADMIN — CEFAZOLIN 2 G: 330 INJECTION, POWDER, FOR SOLUTION INTRAMUSCULAR; INTRAVENOUS at 08:52

## 2023-04-05 RX ADMIN — FENTANYL CITRATE 50 MCG: 0.05 INJECTION, SOLUTION INTRAMUSCULAR; INTRAVENOUS at 10:15

## 2023-04-05 RX ADMIN — EPHEDRINE SULFATE 10 MG: 50 INJECTION, SOLUTION INTRAVENOUS at 09:07

## 2023-04-05 RX ADMIN — FENTANYL CITRATE 50 MCG: 0.05 INJECTION, SOLUTION INTRAMUSCULAR; INTRAVENOUS at 10:01

## 2023-04-05 RX ADMIN — Medication 0.5 ML: at 07:45

## 2023-04-05 RX ADMIN — DEXAMETHASONE SODIUM PHOSPHATE 8 MG: 4 INJECTION, SOLUTION INTRA-ARTICULAR; INTRALESIONAL; INTRAMUSCULAR; INTRAVENOUS; SOFT TISSUE at 09:00

## 2023-04-05 RX ADMIN — LIDOCAINE HYDROCHLORIDE 0.5 ML: 10 INJECTION, SOLUTION EPIDURAL; INFILTRATION; INTRACAUDAL; PERINEURAL at 07:45

## 2023-04-05 RX ADMIN — SUGAMMADEX 200 MG: 100 INJECTION, SOLUTION INTRAVENOUS at 09:28

## 2023-04-05 RX ADMIN — SCOPALAMINE 1 PATCH: 1 PATCH, EXTENDED RELEASE TRANSDERMAL at 08:04

## 2023-04-05 RX ADMIN — DEXMEDETOMIDINE 20 MCG: 200 INJECTION, SOLUTION INTRAVENOUS at 09:02

## 2023-04-05 RX ADMIN — FENTANYL CITRATE 50 MCG: 50 INJECTION, SOLUTION INTRAMUSCULAR; INTRAVENOUS at 09:03

## 2023-04-05 RX ADMIN — ONDANSETRON 4 MG: 2 INJECTION INTRAMUSCULAR; INTRAVENOUS at 09:34

## 2023-04-05 RX ADMIN — FENTANYL CITRATE 50 MCG: 50 INJECTION, SOLUTION INTRAMUSCULAR; INTRAVENOUS at 09:34

## 2023-04-05 RX ADMIN — LIDOCAINE HYDROCHLORIDE 80 MG: 20 INJECTION, SOLUTION EPIDURAL; INFILTRATION; INTRACAUDAL at 08:52

## 2023-04-05 RX ADMIN — SODIUM CHLORIDE, POTASSIUM CHLORIDE, SODIUM LACTATE AND CALCIUM CHLORIDE: 600; 310; 30; 20 INJECTION, SOLUTION INTRAVENOUS at 07:56

## 2023-04-05 RX ADMIN — SODIUM CHLORIDE, POTASSIUM CHLORIDE, SODIUM LACTATE AND CALCIUM CHLORIDE: 600; 310; 30; 20 INJECTION, SOLUTION INTRAVENOUS at 09:39

## 2023-04-05 RX ADMIN — MIDAZOLAM HYDROCHLORIDE 2 MG: 1 INJECTION, SOLUTION INTRAMUSCULAR; INTRAVENOUS at 08:49

## 2023-04-05 RX ADMIN — OXYCODONE HYDROCHLORIDE 10 MG: 5 SOLUTION ORAL at 10:01

## 2023-04-05 RX ADMIN — ROCURONIUM BROMIDE 60 MG: 10 INJECTION, SOLUTION INTRAVENOUS at 08:52

## 2023-04-05 RX ADMIN — FENTANYL CITRATE 50 MCG: 50 INJECTION, SOLUTION INTRAMUSCULAR; INTRAVENOUS at 08:49

## 2023-04-05 RX ADMIN — PROPOFOL 150 MG: 10 INJECTION, EMULSION INTRAVENOUS at 08:52

## 2023-04-05 RX ADMIN — ACETAMINOPHEN 1000 MG: 500 TABLET ORAL at 07:45

## 2023-04-05 ASSESSMENT — PAIN DESCRIPTION - PAIN TYPE
TYPE: ACUTE PAIN
TYPE: SURGICAL PAIN

## 2023-04-05 ASSESSMENT — PAIN SCALES - GENERAL: PAIN_LEVEL: 3

## 2023-04-05 ASSESSMENT — FIBROSIS 4 INDEX: FIB4 SCORE: 1.22

## 2023-04-05 NOTE — OR NURSING
1057 Report received from PACU Rn and patient arrived to phase 2. AOx 4.     1105 Vital signs stable. Pain level 3/10, no complaints of n/v.  Dressing to abdomen lap sites x 4 c/d/i.      1140 Patient states ready to go home.  VSS, patient has all belongings. IV removed.  Patient dressed.  IS provided and instructed on use, good effort, demonstrates understanding. Discharge instructions discussed with patient and .  Questions answered. Patient and  verbalized understanding.  Rx for zofran and hydrocodone electronically sent to patient's pharmacy and verified that it is available for . Patient taken out with wheelchair and all belongings and ice pack.

## 2023-04-05 NOTE — OP REPORT
NAME:  Amy Denson  MRN:  [id number]  :  1960      DATE OF OPERATION: 2023    PREOPERATIVE DIAGNOSIS: Biliary colic    POSTOPERATIVE DIAGNOSIS: Cholecystitis. cholelithiasisis    OPERATION PERFORMED: Laparoscopic cholecystectomy    SURGEON: Oswaldo Smith MD    ASSISTANT:  Jef Loomis PA-C, PA-C    ANESTHESIOLOGIST:  Anesthesiologist: Chen Jasso M.D.    ANESTHESIA: General endotracheal anesthesia.     FINDINGS: The gallbladder showed signs of large gallstone and cholecystitis.     SPECIMEN: Gallbladder.    ESTIMATED BLOOD LOSS: <10 cc.     INDICATIONS: The patient is a 63 y.o. female with a history of symptomatic cholelithiasis. She is taken to the operating room today for laparoscopic cholecystectomy.     PROCEDURE: Following informed consent, the patient was properly identified, taken to the operating room, and placed in the supine position where general endotracheal anesthesia was administered. Intravenous antibiotics were administered by the anesthesiologist in the correct time interval. Sequential compression devices were employed. The abdomen was prepped and draped into a sterile field.     A bladeless optical entry trocar was carefully inserted into the abdomen and a pneumoperitoneum was established in the usual fashion.  A 5 mm separator port was introduced. A 5 mm lens/camera was passed into the peritoneal cavity.  An 11 mm port was placed in the epigastric midline under direct vision. Two 5 mm right subcostal ports were placed under direct vision.     Careful examination of the gallbladder and liver were performed.  Adhesions to the gallbladder serosa were lysed. 60cc of bile were aspirated from the gallbladder which contained a large stone. The gallbladder was then grasped and elevated upward toward the right shoulder.  Dissection was carried out in hepatocystic triangle definitively identifying the cystic duct and cystic   artery. These structures were multiply clipped  proximally, once distally and   divided. The gallbladder was dissected free from the undersurface of the   liver using electrocautery and placed within an EndoCatch bag. It was delivered intact from the abdominal cavity through the epigastric port site. The gallbladder fossa was irrigated. All excess irrigant was evacuated from the abdominal cavity. Hemostasis was meticulous.     The ports were removed and the abdomen desufflated. The enlarged epigastric port site fascia was approximated with a 0 Vicryl suture. The port site skin incisions were closed with interrupted 4-0 Vicryl subcuticular sutures.  Steri-Strips and Benzoin were applied beneath sterile Band-Aids.     The patient tolerated the procedure well and there were no apparent complications. All sponge, needle, and instrument counts were correct on 2 separate occasions. She was awakened, extubated, and transferred to the recovery room in satisfactory condition.       ____________________________________   Oswaldo Smith MD  DD: 4/5/2023  4:09 PM    CC:  [facility];

## 2023-04-05 NOTE — DISCHARGE INSTRUCTIONS
HOME CARE INSTRUCTIONS    ACTIVITY: Rest and take it easy for the first 24 hours.  A responsible adult is recommended to remain with you during that time.  It is normal to feel sleepy.  We encourage you to not do anything that requires balance, judgment or coordination.    FOR 24 HOURS DO NOT:  Drive, operate machinery or run household appliances.  Drink beer or alcoholic beverages.  Make important decisions or sign legal documents.    SPECIAL INSTRUCTIONS: Discharge instructions:    1. DIET: Upon discharge from the hospital you may resume your normal preoperative diet. Depending on how you are feeling and whether you have nausea or not, you may wish to stay with a bland diet for the first few days. However, you can advance this as quickly as you feel ready.    2. ACTIVITIES: After discharge from the hospital, you may resume full routine activities. However, there should be no heavy lifting (greater than 20 pounds) and no strenuous activities until after your follow-up visit. Otherwise, routine activities of daily living are acceptable.    3. DRIVING: You may drive whenever you are off pain medications and are able to perform the activities needed to drive, i.e. turning, bending, twisting, etc.    4. BATHING: You may get the wound wet 2 days after surgery. You may shower, but do not submerge in a bath for at least a week. Dressings may come off after 48 hours. You have steri-strips under your dressings. These steri-strips should stay in place. They will fall off over 5-7 days.     5. BOWEL FUNCTION: Constipation is common after an operation, especially with pain medications. The combination of pain medication and decreased activity level can cause constipation in otherwise normal patients. If you feel this is occurring, take a laxative (Miralax, Milk of Magnesia, Ex-Lax, Senokot, etc.) until the problem has resolved.    6. PAIN MEDICATION: You will be given a prescription for pain medication at discharge. Please  take these as directed. It is important to remember not to take medications on an empty stomach as this may cause nausea.    7.CALL IF YOU HAVE: (1) Fevers to more than 101.0 F, (2) Unusual chest or leg pain, (3) Drainage or fluid from incision that may be foul smelling, increased tenderness or soreness at the wound or the wound edges are no longer together, redness or swelling at the incision site. Please do not hesitate to call with any other questions.     8. APPOINTMENT: Contact our office at [phone] for a follow-up appointment in 1-2 weeks following your procedure.    If you have any additional questions, please do not hesitate to call the office and speak to either myself or the physician on call.    Office address:  [address]  [address]  [place] [address]     DIET: To avoid nausea, slowly advance diet as tolerated, avoiding spicy or greasy foods for the first day.  Add more substantial food to your diet according to your physician's instructions.  Babies can be fed formula or breast milk as soon as they are hungry.  INCREASE FLUIDS AND FIBER TO AVOID CONSTIPATION.    MEDICATIONS: Resume taking daily medication.  Take prescribed pain medication with food.  If no medication is prescribed, you may take non-aspirin pain medication if needed.  PAIN MEDICATION CAN BE VERY CONSTIPATING.  Take a stool softener or laxative such as senokot, pericolace, or milk of magnesia if needed.    Prescription given for ___________.  Last pain medication given at 10:01.    A follow-up appointment should be arranged with your doctor in 1-2 weeks; call to schedule.    You should CALL YOUR PHYSICIAN if you develop:  Fever greater than 101 degrees F.  Pain not relieved by medication, or persistent nausea or vomiting.  Excessive bleeding (blood soaking through dressing) or unexpected drainage from the wound.  Extreme redness or swelling around the incision site, drainage of pus or foul smelling  drainage.  Inability to urinate or empty your bladder within 8 hours.  Problems with breathing or chest pain.    You should call 911 if you develop problems with breathing or chest pain.  If you are unable to contact your doctor or surgical center, you should go to the nearest emergency room or urgent care center.  Physician's telephone #: [phone]    MILD FLU-LIKE SYMPTOMS ARE NORMAL.  YOU MAY EXPERIENCE GENERALIZED MUSCLE ACHES, THROAT IRRITATION, HEADACHE AND/OR SOME NAUSEA.    If any questions arise, call your doctor.  If your doctor is not available, please feel free to call the Surgical Center at [phone].  The Center is open Monday through Friday from 7AM to 7PM.      A registered nurse may call you a few days after your surgery to see how you are doing after your procedure.    You may also receive a survey in the mail within the next two weeks and we ask that you take a few moments to complete the survey and return it to us.  Our goal is to provide you with very good care and we value your comments.     Depression / Suicide Risk    As you are discharged from this [facility] facility, it is important to learn how to keep safe from harming yourself.    Recognize the warning signs:  Abrupt changes in personality, positive or negative- including increase in energy   Giving away possessions  Change in eating patterns- significant weight changes-  positive or negative  Change in sleeping patterns- unable to sleep or sleeping all the time   Unwillingness or inability to communicate  Depression  Unusual sadness, discouragement and loneliness  Talk of wanting to die  Neglect of personal appearance   Rebelliousness- reckless behavior  Withdrawal from people/activities they love  Confusion- inability to concentrate     If you or a loved one observes any of these behaviors or has concerns about self-harm, here's what you can do:  Talk about it- your feelings and reasons for harming yourself  Remove any  means that you might use to hurt yourself (examples: pills, rope, extension cords, firearm)  Get professional help from the community (Mental Health, Substance Abuse, psychological counseling)  Do not be alone:Call your Safe Contact- someone whom you trust who will be there for you.  Call your local CRISIS HOTLINE 450-5078 or 181-284-0035  Call your local Children's Mobile Crisis Response Team [place] (576) 058-5308 or www.easyOwn.it  Call the toll free National Suicide Prevention Hotlines   National Suicide Prevention Lifeline 769-226-SDMN (7979)  National [place] Line Network 800-SUICIDE (072-0246)    I acknowledge receipt and understanding of these Home Care instructions.

## 2023-04-05 NOTE — ANESTHESIA POSTPROCEDURE EVALUATION
Patient: Amy Denosn    Procedure Summary     Date: 04/05/23 Room / Location: [place] [id number] / SURGERY [facility]    Anesthesia Start: 0847 Anesthesia Stop: 0947    Procedure: LAPAROSCOPIC CHOLECYSTECTOMY. (Abdomen) Diagnosis: (CHOLELITHIASIS)    Surgeons: Oswaldo Smith M.D. Responsible Provider: Chen Jasso M.D.    Anesthesia Type: general ASA Status: 2          Final Anesthesia Type: general  Last vitals  BP   Blood Pressure: (!) 140/70    Temp   36.4 °C (97.5 °F)    Pulse   70   Resp   16    SpO2   94 %      Anesthesia Post Evaluation    Patient location during evaluation: PACU  Patient participation: complete - patient participated  Level of consciousness: awake and alert  Pain score: 3    Airway patency: patent  Anesthetic complications: no  Cardiovascular status: hemodynamically stable  Respiratory status: acceptable  Hydration status: euvolemic    PONV: none          No notable events documented.     Nurse Pain Score: 3 (NPRS)

## 2023-04-05 NOTE — OR NURSING
Patient arrived to PACU in stable condition.  Surgical lap sites to abdomen closed with steri-strips and bandaids, minimal drainage to superior incision. Incision with drainage was redressed  Medicated for pain per MAR  Voicemail left  Daniel updated on patient condition   Patient tolerated clears without nausea or vomiting  Report given to Olena FERRARI. Patient transferred to phase 2

## 2023-04-05 NOTE — ANESTHESIA PREPROCEDURE EVALUATION
Case: [id number] Date/Time: 04/05/23 0830    Procedure: LAPAROSCOPIC CHOLECYSTECTOMY. (Abdomen)    Anesthesia type: General    Pre-op diagnosis: CHOLELITHIASIS    Location: [facility] OR 10 / SURGERY [facility]    Surgeons: Oswaldo Smith M.D.          Relevant Problems   ENDO   (positive) Type 2 diabetes mellitus without complication, without long-term current use of insulin (HCC)   obesity  Anxiety  Depression  snoring    Physical Exam    Airway   Mallampati: II  TM distance: >3 FB  Neck ROM: full       Cardiovascular - normal exam  Rhythm: regular  Rate: normal  (-) murmur     Dental - normal exam           Pulmonary - normal exam  Breath sounds clear to auscultation     Abdominal    Neurological - normal exam                 Anesthesia Plan    ASA 2       Plan - general       Airway plan will be ETT          Induction: intravenous    Postoperative Plan: Postoperative administration of opioids is intended.    Pertinent diagnostic labs and testing reviewed    Informed Consent:    Anesthetic plan and risks discussed with patient.    Use of blood products discussed with: patient whom consented to blood products.

## 2023-04-05 NOTE — ANESTHESIA TIME REPORT
Anesthesia Start and Stop Event Times     Date Time Event    4/5/2023 0834 Ready for Procedure     0847 Anesthesia Start     0947 Anesthesia Stop        Responsible Staff  04/05/23    Name Role Begin End    Chen Jasso M.D. Anesth [id number]        Overtime Reason:  no overtime (within assigned shift)    Comments:

## 2023-04-05 NOTE — ANESTHESIA PROCEDURE NOTES
Airway    Date/Time: 4/5/2023 8:53 AM  Performed by: Chen Jasso M.D.  Authorized by: Chen Jasso M.D.     Location:  OR  Urgency:  Elective  Difficult Airway: No    Indications for Airway Management:  Anesthesia      Spontaneous Ventilation: absent    Sedation Level:  Deep  Preoxygenated: Yes    Patient Position:  Sniffing  Mask Difficulty Assessment:  1 - vent by mask  Final Airway Type:  Endotracheal airway  Final Endotracheal Airway:  ETT  Cuffed: Yes    Technique Used for Successful ETT Placement:  Direct laryngoscopy    Insertion Site:  Oral  Blade Type:  Glide  Laryngoscope Blade/Videolaryngoscope Blade Size:  3  ETT Size (mm):  7.5  Measured from:  Teeth  ETT to Teeth (cm):  21  Placement Verified by: auscultation and capnometry    Cormack-Lehane Classification:  Grade I - full view of glottis  Number of Attempts at Approach:  1   Pt was a Grade III with a DL and grade 2a with a glidescope

## 2023-04-12 ENCOUNTER — PATIENT MESSAGE (OUTPATIENT)
Dept: HEALTH INFORMATION MANAGEMENT | Facility: OTHER | Age: 63
End: 2023-04-12

## 2023-04-12 ENCOUNTER — DOCUMENTATION (OUTPATIENT)
Dept: HEALTH INFORMATION MANAGEMENT | Facility: OTHER | Age: 63
End: 2023-04-12
Payer: MEDICARE

## 2023-05-22 ENCOUNTER — TELEPHONE (OUTPATIENT)
Dept: MEDICAL GROUP | Facility: LAB | Age: 63
End: 2023-05-22
Payer: MEDICARE

## 2023-05-22 NOTE — TELEPHONE ENCOUNTER
ESTABLISHED PATIENT PRE-VISIT PLANNING     Patient was NOT contacted to complete PVP.     Note: Patient will not be contacted if there is no indication to call.     1.  Reviewed notes from the last few office visits within the medical group: Yes    2.  If any orders were placed at last visit or intended to be done for this visit (i.e. 6 mos follow-up), do we have Results/Consult Notes?           Labs - Labs were not ordered at last office visit.  Note: If patient appointment is for lab review and patient did not complete labs, check with provider if OK to reschedule patient until labs completed.         Imaging - Imaging was not ordered at last office visit.         Referrals - No referrals were ordered at last office visit.    3. Is this appointment scheduled as a Hospital Follow-Up? No    4.  Immunizations were updated in Epic using Reconcile Outside Information activity? Yes    5.  Patient is due for the following Health Maintenance Topics:   Health Maintenance Due   Topic Date Due    IMM ZOSTER VACCINES (1 of 2) Never done    COVID-19 Vaccine (4 - Booster for Pfizer series) 05/03/2022    RETINAL SCREENING  07/14/2022    Annual Wellness Visit  02/18/2023     6.  AHA (Pulse8) form printed for Provider? N/A

## 2023-05-24 ENCOUNTER — TELEPHONE (OUTPATIENT)
Dept: MEDICAL GROUP | Facility: LAB | Age: 63
End: 2023-05-24
Payer: MEDICARE

## 2023-05-30 ENCOUNTER — OFFICE VISIT (OUTPATIENT)
Dept: MEDICAL GROUP | Facility: LAB | Age: 63
End: 2023-05-30
Payer: MEDICARE

## 2023-05-30 VITALS
DIASTOLIC BLOOD PRESSURE: 70 MMHG | OXYGEN SATURATION: 95 % | WEIGHT: 241 LBS | SYSTOLIC BLOOD PRESSURE: 118 MMHG | HEART RATE: 86 BPM | HEIGHT: 68 IN | RESPIRATION RATE: 16 BRPM | TEMPERATURE: 97.1 F | BODY MASS INDEX: 36.53 KG/M2

## 2023-05-30 DIAGNOSIS — Z90.49 STATUS POST CHOLECYSTECTOMY: ICD-10-CM

## 2023-05-30 DIAGNOSIS — E66.9 OBESITY (BMI 30-39.9): ICD-10-CM

## 2023-05-30 DIAGNOSIS — E11.9 TYPE 2 DIABETES MELLITUS WITHOUT COMPLICATION, WITHOUT LONG-TERM CURRENT USE OF INSULIN (HCC): ICD-10-CM

## 2023-05-30 PROCEDURE — 99214 OFFICE O/P EST MOD 30 MIN: CPT | Performed by: NURSE PRACTITIONER

## 2023-05-30 PROCEDURE — 3078F DIAST BP <80 MM HG: CPT | Performed by: NURSE PRACTITIONER

## 2023-05-30 PROCEDURE — 3074F SYST BP LT 130 MM HG: CPT | Performed by: NURSE PRACTITIONER

## 2023-05-30 ASSESSMENT — PATIENT HEALTH QUESTIONNAIRE - PHQ9
1. LITTLE INTEREST OR PLEASURE IN DOING THINGS: NOT AT ALL
8. MOVING OR SPEAKING SO SLOWLY THAT OTHER PEOPLE COULD HAVE NOTICED. OR THE OPPOSITE, BEING SO FIGETY OR RESTLESS THAT YOU HAVE BEEN MOVING AROUND A LOT MORE THAN USUAL: NOT AT ALL
SUM OF ALL RESPONSES TO PHQ9 QUESTIONS 1 AND 2: 0
7. TROUBLE CONCENTRATING ON THINGS, SUCH AS READING THE NEWSPAPER OR WATCHING TELEVISION: NOT AT ALL
2. FEELING DOWN, DEPRESSED, IRRITABLE, OR HOPELESS: NOT AT ALL
5. POOR APPETITE OR OVEREATING: NOT AT ALL
SUM OF ALL RESPONSES TO PHQ QUESTIONS 1-9: 0
3. TROUBLE FALLING OR STAYING ASLEEP OR SLEEPING TOO MUCH: NOT AT ALL
9. THOUGHTS THAT YOU WOULD BE BETTER OFF DEAD, OR OF HURTING YOURSELF: NOT AT ALL
4. FEELING TIRED OR HAVING LITTLE ENERGY: NOT AT ALL
6. FEELING BAD ABOUT YOURSELF - OR THAT YOU ARE A FAILURE OR HAVE LET YOURSELF OR YOUR FAMILY DOWN: NOT AL ALL

## 2023-05-30 ASSESSMENT — FIBROSIS 4 INDEX: FIB4 SCORE: 1.22

## 2023-05-30 NOTE — PROGRESS NOTES
".  Chief Complaint   Patient presents with    Diabetes Follow-up       HPI:  Amy is a 63-year-old established female here for follow-up and overall doing really well.  #1- s/p choly:  done b/c of numerous stones / diarrhea - now resolved / improved.  Had her gallbladder removed in April.  #2- Type 2 diabetes mellitus without complication, without long-term current use of insulin (HCC): Unfortunately she has had about a 10 pound weight gain over the past 6 months.  Has been physically inactive during the winter cold months but plans on restarting water aerobics.  [facility] - [facility] - told that she doesn't have retinopathy.   Denies burning or pain in feet.  Not exercising at all.   Bowels moving fine.  Denies dysuria.    Wants to stay off meds diabetes.        Exam:  /70 (BP Location: Left arm, Patient Position: Sitting, BP Cuff Size: Large adult)   Pulse 86   Temp 36.2 °C (97.1 °F)   Resp 16   Ht 1.727 m (5' 8\")   Wt 109 kg (241 lb)   SpO2 95%     Exam:  Gen. appears healthy in no distress   Skin appropriate for sex and age   Neck trachea is midline  Lungs unlabored breathing  Heart regular rate  Neuro gait and station normal   Psych appropriate, calm, interactive, well-groomed    A/P:  \"  1. Type 2 diabetes mellitus without complication, without long-term current use of insulin ([facility])  HEMOGLOBIN A1C    Basic Metabolic Panel      2. Obesity (BMI 30-39.9)        3. Status post cholecystectomy        DM stable.  Weight worsening.  Doing well s/p choly.    A1c on labs at the end of March stable at 6.2.  She has had a 10 pound weight gain.  We had a good discussion about a diabetic diet and the importance of daily physical exercise.  Encouraged her to try to lose about 25 pounds over the next 3 to 6 months.  Offered GLP-1 agonist such as Trulicity or Ozempic as she does have a diagnosis of type 2 diabetes but she declines this.  She prefers to work harder on a diabetic diet, portion " control and exercise.  Recheck A1c in July and then full lab panel in late November before her wellness check.  She is up-to-date with her eye doctor.  She is doing well in terms of lack of neuropathy symptoms in her feet.  Discussed long-term repercussions of type 2 diabetes.  Doing well after having her gallbladder removed.

## 2023-05-30 NOTE — ASSESSMENT & PLAN NOTE
Just saw eye [facility] - [facility] - told that she doesn't have retinopathy.   Denies burning or pain in feet.  Not exercising at all.   Bowels moving fine.  Denies dysuria.    Wants to stay off meds diabetes.

## 2023-05-31 ENCOUNTER — TELEPHONE (OUTPATIENT)
Dept: MEDICAL GROUP | Facility: LAB | Age: 63
End: 2023-05-31
Payer: MEDICARE

## 2023-05-31 NOTE — TELEPHONE ENCOUNTER
I called [facility] and asked for records from 5/26/23 appt be faxed to [phone].    Rcrds in chart

## 2023-06-12 ENCOUNTER — DOCUMENTATION (OUTPATIENT)
Dept: HEALTH INFORMATION MANAGEMENT | Facility: OTHER | Age: 63
End: 2023-06-12
Payer: MEDICARE

## 2023-07-11 ENCOUNTER — TELEPHONE (OUTPATIENT)
Dept: MEDICAL GROUP | Facility: LAB | Age: 63
End: 2023-07-11

## 2023-07-11 NOTE — LETTER
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      If you have any questions or concerns, please don't hesitate to call [phone]          Sincerely,          DISHA [email or web address].[email or web address].  Electronically Signed

## 2023-07-12 NOTE — TELEPHONE ENCOUNTER
Please ask her how often she typically needs to sit down and how often she needs a break.  I have to be very specific on letters.  Thanks!

## 2023-07-12 NOTE — TELEPHONE ENCOUNTER
Patient called and LVM stating that her employer is giving her a hard time regarding special accommodations for her due to her ankles flaring up and swelling. She states that she needs a letter from you allowing her to take breaks as needed, sit instead of standing at the register and allowing her to bring her own chair.   Please advise

## 2023-07-12 NOTE — TELEPHONE ENCOUNTER
Needs to be allowed to bring in her own chair to sit during her 4 hour shift approximately 3 times for approx 5 minutes as needed. Will need to take her 15 minute break alf through the shift

## 2023-07-17 ENCOUNTER — TELEPHONE (OUTPATIENT)
Dept: MEDICAL GROUP | Facility: LAB | Age: 63
End: 2023-07-17
Payer: MEDICARE

## 2023-07-17 NOTE — TELEPHONE ENCOUNTER
Patient called and is asking for a letter. She's stating that her employer has been harassing her to get this letter completed.

## 2023-07-17 NOTE — TELEPHONE ENCOUNTER
Patient cannot see the letter on [employer]. Will call patient and see if she can  the letter.

## 2023-07-31 ENCOUNTER — TELEPHONE (OUTPATIENT)
Dept: HEALTH INFORMATION MANAGEMENT | Facility: OTHER | Age: 63
End: 2023-07-31
Payer: MEDICARE

## 2023-08-16 ENCOUNTER — HOSPITAL ENCOUNTER (OUTPATIENT)
Dept: LAB | Facility: MEDICAL CENTER | Age: 63
End: 2023-08-16
Attending: NURSE PRACTITIONER
Payer: MEDICARE

## 2023-08-16 DIAGNOSIS — E11.9 TYPE 2 DIABETES MELLITUS WITHOUT COMPLICATION, WITHOUT LONG-TERM CURRENT USE OF INSULIN (HCC): ICD-10-CM

## 2023-08-16 LAB
ANION GAP SERPL CALC-SCNC: 14 MMOL/L (ref 7–16)
BUN SERPL-MCNC: 15 MG/DL (ref 8–22)
CALCIUM SERPL-MCNC: 10.1 MG/DL (ref 8.5–10.5)
CHLORIDE SERPL-SCNC: 104 MMOL/L (ref 96–112)
CO2 SERPL-SCNC: 22 MMOL/L (ref 20–33)
CREAT SERPL-MCNC: 0.7 MG/DL (ref 0.5–1.4)
EST. AVERAGE GLUCOSE BLD GHB EST-MCNC: 128 MG/DL
GFR SERPLBLD CREATININE-BSD FMLA CKD-EPI: 98 ML/MIN/1.73 M 2
GLUCOSE SERPL-MCNC: 96 MG/DL (ref 65–99)
HBA1C MFR BLD: 6.1 % (ref 4–5.6)
POTASSIUM SERPL-SCNC: 4.1 MMOL/L (ref 3.6–5.5)
SODIUM SERPL-SCNC: 140 MMOL/L (ref 135–145)

## 2023-08-16 PROCEDURE — 80048 BASIC METABOLIC PNL TOTAL CA: CPT

## 2023-08-16 PROCEDURE — 83036 HEMOGLOBIN GLYCOSYLATED A1C: CPT

## 2023-08-16 PROCEDURE — 36415 COLL VENOUS BLD VENIPUNCTURE: CPT

## 2023-09-28 ENCOUNTER — HOSPITAL ENCOUNTER (OUTPATIENT)
Dept: RADIOLOGY | Facility: MEDICAL CENTER | Age: 63
End: 2023-09-28
Attending: NURSE PRACTITIONER
Payer: MEDICARE

## 2023-09-28 DIAGNOSIS — Z12.31 VISIT FOR SCREENING MAMMOGRAM: ICD-10-CM

## 2023-09-28 PROCEDURE — 77063 BREAST TOMOSYNTHESIS BI: CPT

## 2023-11-19 RX ORDER — DULOXETIN HYDROCHLORIDE 60 MG/1
60 CAPSULE, DELAYED RELEASE ORAL
Qty: 90 CAPSULE | Refills: 1 | Status: SHIPPED | OUTPATIENT
Start: 2023-11-19

## 2023-11-19 NOTE — TELEPHONE ENCOUNTER
Received request via: Pharmacy    Was the patient seen in the last year in this department? Yes    Does the patient have an active prescription (recently filled or refills available) for medication(s) requested? yes    Does the patient have MCFP Plus and need 100 day supply (blood pressure, diabetes and cholesterol meds only)? Medication is not for cholesterol, blood pressure or diabetes   Requested Prescriptions     Pending Prescriptions Disp Refills    DULoxetine (CYMBALTA) 60 MG Cap DR Particles delayed-release capsule [Pharmacy Med Name: DULOXETINE HCL DR 60 MG CAP] 90 Capsule 1     Sig: TAKE 1 CAPSULE BY MOUTH EVERY DAY

## 2023-11-28 SDOH — ECONOMIC STABILITY: FOOD INSECURITY: WITHIN THE PAST 12 MONTHS, YOU WORRIED THAT YOUR FOOD WOULD RUN OUT BEFORE YOU GOT MONEY TO BUY MORE.: NEVER TRUE

## 2023-11-28 SDOH — ECONOMIC STABILITY: FOOD INSECURITY: WITHIN THE PAST 12 MONTHS, THE FOOD YOU BOUGHT JUST DIDN'T LAST AND YOU DIDN'T HAVE MONEY TO GET MORE.: NEVER TRUE

## 2023-11-28 SDOH — ECONOMIC STABILITY: HOUSING INSECURITY: IN THE LAST 12 MONTHS, HOW MANY PLACES HAVE YOU LIVED?: 1

## 2023-11-28 SDOH — HEALTH STABILITY: PHYSICAL HEALTH: ON AVERAGE, HOW MANY DAYS PER WEEK DO YOU ENGAGE IN MODERATE TO STRENUOUS EXERCISE (LIKE A BRISK WALK)?: 3 DAYS

## 2023-11-28 SDOH — ECONOMIC STABILITY: INCOME INSECURITY: IN THE LAST 12 MONTHS, WAS THERE A TIME WHEN YOU WERE NOT ABLE TO PAY THE MORTGAGE OR RENT ON TIME?: NO

## 2023-11-28 SDOH — HEALTH STABILITY: PHYSICAL HEALTH: ON AVERAGE, HOW MANY MINUTES DO YOU ENGAGE IN EXERCISE AT THIS LEVEL?: 30 MIN

## 2023-11-28 SDOH — ECONOMIC STABILITY: INCOME INSECURITY: HOW HARD IS IT FOR YOU TO PAY FOR THE VERY BASICS LIKE FOOD, HOUSING, MEDICAL CARE, AND HEATING?: NOT HARD AT ALL

## 2023-11-28 ASSESSMENT — LIFESTYLE VARIABLES
HOW OFTEN DO YOU HAVE SIX OR MORE DRINKS ON ONE OCCASION: NEVER
SKIP TO QUESTIONS 9-10: 1
AUDIT-C TOTAL SCORE: 1
HOW MANY STANDARD DRINKS CONTAINING ALCOHOL DO YOU HAVE ON A TYPICAL DAY: 1 OR 2
HOW OFTEN DO YOU HAVE A DRINK CONTAINING ALCOHOL: MONTHLY OR LESS

## 2023-11-28 ASSESSMENT — SOCIAL DETERMINANTS OF HEALTH (SDOH)
DO YOU BELONG TO ANY CLUBS OR ORGANIZATIONS SUCH AS CHURCH GROUPS UNIONS, FRATERNAL OR ATHLETIC GROUPS, OR SCHOOL GROUPS?: NO
HOW OFTEN DO YOU ATTEND CHURCH OR RELIGIOUS SERVICES?: 1 TO 4 TIMES PER YEAR
WITHIN THE PAST 12 MONTHS, YOU WORRIED THAT YOUR FOOD WOULD RUN OUT BEFORE YOU GOT THE MONEY TO BUY MORE: NEVER TRUE
HOW HARD IS IT FOR YOU TO PAY FOR THE VERY BASICS LIKE FOOD, HOUSING, MEDICAL CARE, AND HEATING?: NOT HARD AT ALL
HOW OFTEN DO YOU ATTENT MEETINGS OF THE CLUB OR ORGANIZATION YOU BELONG TO?: NEVER
HOW OFTEN DO YOU GET TOGETHER WITH FRIENDS OR RELATIVES?: ONCE A WEEK
IN A TYPICAL WEEK, HOW MANY TIMES DO YOU TALK ON THE PHONE WITH FAMILY, FRIENDS, OR NEIGHBORS?: ONCE A WEEK
HOW OFTEN DO YOU GET TOGETHER WITH FRIENDS OR RELATIVES?: ONCE A WEEK
HOW OFTEN DO YOU ATTEND CHURCH OR RELIGIOUS SERVICES?: 1 TO 4 TIMES PER YEAR
HOW MANY DRINKS CONTAINING ALCOHOL DO YOU HAVE ON A TYPICAL DAY WHEN YOU ARE DRINKING: 1 OR 2
HOW OFTEN DO YOU ATTENT MEETINGS OF THE CLUB OR ORGANIZATION YOU BELONG TO?: NEVER
DO YOU BELONG TO ANY CLUBS OR ORGANIZATIONS SUCH AS CHURCH GROUPS UNIONS, FRATERNAL OR ATHLETIC GROUPS, OR SCHOOL GROUPS?: NO
HOW OFTEN DO YOU HAVE A DRINK CONTAINING ALCOHOL: MONTHLY OR LESS
IN A TYPICAL WEEK, HOW MANY TIMES DO YOU TALK ON THE PHONE WITH FAMILY, FRIENDS, OR NEIGHBORS?: ONCE A WEEK
HOW OFTEN DO YOU HAVE SIX OR MORE DRINKS ON ONE OCCASION: NEVER

## 2023-11-29 ENCOUNTER — OFFICE VISIT (OUTPATIENT)
Dept: MEDICAL GROUP | Facility: LAB | Age: 63
End: 2023-11-29
Payer: MEDICARE

## 2023-11-29 VITALS
WEIGHT: 233 LBS | SYSTOLIC BLOOD PRESSURE: 138 MMHG | HEART RATE: 68 BPM | TEMPERATURE: 96.3 F | HEIGHT: 68 IN | RESPIRATION RATE: 16 BRPM | BODY MASS INDEX: 35.31 KG/M2 | OXYGEN SATURATION: 96 % | DIASTOLIC BLOOD PRESSURE: 72 MMHG

## 2023-11-29 DIAGNOSIS — E66.01 MORBID (SEVERE) OBESITY DUE TO EXCESS CALORIES (HCC): ICD-10-CM

## 2023-11-29 DIAGNOSIS — Z90.49 STATUS POST CHOLECYSTECTOMY: ICD-10-CM

## 2023-11-29 DIAGNOSIS — E11.9 TYPE 2 DIABETES MELLITUS WITHOUT COMPLICATION, WITHOUT LONG-TERM CURRENT USE OF INSULIN (HCC): ICD-10-CM

## 2023-11-29 DIAGNOSIS — Z23 NEED FOR PNEUMOCOCCAL VACCINE: ICD-10-CM

## 2023-11-29 DIAGNOSIS — F33.42 MDD (MAJOR DEPRESSIVE DISORDER), RECURRENT, IN FULL REMISSION (HCC): ICD-10-CM

## 2023-11-29 DIAGNOSIS — Z00.00 MEDICARE ANNUAL WELLNESS VISIT, SUBSEQUENT: ICD-10-CM

## 2023-11-29 DIAGNOSIS — E78.00 ELEVATED LDL CHOLESTEROL LEVEL: ICD-10-CM

## 2023-11-29 DIAGNOSIS — E55.9 VITAMIN D DEFICIENCY: ICD-10-CM

## 2023-11-29 DIAGNOSIS — Z78.0 ASYMPTOMATIC MENOPAUSE: ICD-10-CM

## 2023-11-29 DIAGNOSIS — E04.1 THYROID NODULE: ICD-10-CM

## 2023-11-29 DIAGNOSIS — L60.8 TOENAIL DEFORMITY: ICD-10-CM

## 2023-11-29 PROBLEM — M76.61 ACHILLES TENDINITIS OF BOTH LOWER EXTREMITIES: Status: RESOLVED | Noted: 2022-02-17 | Resolved: 2023-11-29

## 2023-11-29 PROBLEM — M76.62 ACHILLES TENDINITIS OF BOTH LOWER EXTREMITIES: Status: RESOLVED | Noted: 2022-02-17 | Resolved: 2023-11-29

## 2023-11-29 PROCEDURE — 3078F DIAST BP <80 MM HG: CPT | Performed by: NURSE PRACTITIONER

## 2023-11-29 PROCEDURE — 3075F SYST BP GE 130 - 139MM HG: CPT | Performed by: NURSE PRACTITIONER

## 2023-11-29 PROCEDURE — 90677 PCV20 VACCINE IM: CPT | Performed by: NURSE PRACTITIONER

## 2023-11-29 PROCEDURE — G0009 ADMIN PNEUMOCOCCAL VACCINE: HCPCS | Performed by: NURSE PRACTITIONER

## 2023-11-29 PROCEDURE — G0439 PPPS, SUBSEQ VISIT: HCPCS | Mod: 25 | Performed by: NURSE PRACTITIONER

## 2023-11-29 ASSESSMENT — ENCOUNTER SYMPTOMS: GENERAL WELL-BEING: GOOD

## 2023-11-29 ASSESSMENT — PATIENT HEALTH QUESTIONNAIRE - PHQ9: CLINICAL INTERPRETATION OF PHQ2 SCORE: 0

## 2023-11-29 ASSESSMENT — FIBROSIS 4 INDEX: FIB4 SCORE: 1.22

## 2023-11-29 ASSESSMENT — ACTIVITIES OF DAILY LIVING (ADL): BATHING_REQUIRES_ASSISTANCE: 0

## 2023-11-29 NOTE — PROGRESS NOTES
Chief Complaint   Patient presents with    Medicare Annual Wellness       HPI:  Amy Denson is a 63 y.o. here for Medicare Annual Wellness Visit.  She is feeling great and denies any specific complaints or concerns.  She continues to follow a diabetic diet, avoiding bread and ice cream.  Has lost about 8 pounds in the past 6 months.  Prefers to stay off medications for type 2 diabetes.  Moods are stable on duloxetine.  Penlac did not help with her raised, deformed toenails and she would like to see podiatry.  She is interested in a bone density.  Her mammogram and colonoscopy are up-to-date.    Patient Active Problem List    Diagnosis Date Noted    Status post cholecystectomy -April 2023- 05/30/2023    Morbid (severe) obesity due to excess calories ([facility]) 08/24/2022    Achilles tendinitis of both lower extremities 02/17/2022    Type 2 diabetes mellitus without complication, without long-term current use of insulin ([facility]) 12/14/2017    MDD (major depressive disorder), recurrent, in full remission ([facility]) 01/05/2017    Thyroid nodule - neg biopsy 2014.   2018.   07/08/2015    Obesity (BMI 30-39.9) 11/30/2011       Current Outpatient Medications   Medication Sig Dispense Refill    DULoxetine (CYMBALTA) 60 MG Cap DR Particles delayed-release capsule TAKE 1 CAPSULE BY MOUTH EVERY DAY 90 Capsule 1    ciclopirox (PENLAC) 8 % solution Apply to nail daily x 9 months 6 mL 11    Blood Glucose Test Strips Test strips order: Test strips for meter covered by insurance. Sig: use once daily in the morning prior to eating and prn ssx high or low sugar #100 RF x 3 100 Strip 5    Lancets Lancets order: Lancets for meter covered by insurance . Sig: use once daily in the morning and prn ssx high or low sugar. #100 RF x 3 100 Each 3     No current facility-administered medications for this visit.          Current supplements as per medication list.     Allergies: Augmentin [amoxicillin-pot clavulanate] and Sulfa  drugs    Current social contact/activities: social media / dinner/ friends/      She  reports that she quit smoking about 13 years ago. Her smoking use included cigarettes. She started smoking about 46 years ago. She has a 16.5 pack-year smoking history. She has never used smokeless tobacco. She reports current alcohol use. She reports that she does not currently use drugs.  Counseling given: Not Answered      ROS:    Gait: Uses no assistive device  Ostomy: No  Other tubes: No  Amputations: No  Chronic oxygen use: No  Last eye exam: 2023  Wears hearing aids: No   : Denies any urinary leakage during the last 6 months    Screening:    Depression Screening  Little interest or pleasure in doing things?  0 - not at all  Feeling down, depressed , or hopeless? 0 - not at all  Patient Health Questionnaire Score: 0     If depressive symptoms identified deferred to follow up visit unless specifically addressed in assessment and plan.    Interpretation of PHQ-9 Total Score   Score Severity   1-4 No Depression   5-9 Mild Depression   10-14 Moderate Depression   15-19 Moderately Severe Depression   20-27 Severe Depression    Screening for Cognitive Impairment  Do you or any of your friends or family members have any concern about your memory? No  Three Minute Recall (Banana, Sunrise, Chair) 2/3    Yuri clock face with all 12 numbers and set the hands to show 20 past 8.  Yes    Cognitive concerns identified deferred for follow up unless specifically addressed in assessment and plan.    Fall Risk Assessment  Has the patient had two or more falls in the last year or any fall with injury in the last year?  No    Safety Assessment  Do you always wear your seatbelt?  Yes  Any changes to home needed to function safely? No  Difficulty hearing.  No  Patient counseled about all safety risks that were identified.    Functional Assessment ADLs  Are there any barriers preventing you from cooking for yourself or meeting nutritional needs?   No.    Are there any barriers preventing you from driving safely or obtaining transportation?  No.    Are there any barriers preventing you from using a telephone or calling for help?  No    Are there any barriers preventing you from shopping?  No.    Are there any barriers preventing you from taking care of your own finances?  No    Are there any barriers preventing you from managing your medications?  No    Are there any barriers preventing you from showering, bathing or dressing yourself? No    Are there any barriers preventing you from doing housework or laundry? No  Are there any barriers preventing you from using the toilet?No  Are you currently engaging in any exercise or physical activity?  Yes.      Self-Assessment of Health  What is your perception of your health? Good  Do you sleep more than six hours a night? Yes  In the past 7 days, how much did pain keep you from doing your normal work? Some  Do you spend quality time with family or friends (virtually or in person)? Yes  Do you usually eat a heart healthy diet that constists of a variety of fruits, vegetables, whole grains and fiber? Yes  Do you eat foods high in fat and/or Fast Food more than three times per week? No    Advance Care Planning  Do you have an Advance Directive, Living Will, Durable Power of , or POLST? No                 Health Maintenance Summary            Overdue - HIV Screening (Once) Overdue - never done      No completion history exists for this topic.              Overdue - Pneumococcal Vaccine: 0-64 Years (1 - PCV) Overdue - never done      No completion history exists for this topic.              Overdue - Hepatitis B Vaccine (Hep B) (1 of 3 - Risk 3-dose series) Overdue - never done      No completion history exists for this topic.              Overdue - COVID-19 Vaccine (4 - Pfizer series) Overdue since 5/3/2022      03/08/2022  Imm Admin: COVID-19 Vaccine, unspecified - HISTORICAL DATA    05/21/2021  Imm Admin:  PFIZER PURPLE CAP SARS-COV-2 VACCINATION (12+)    04/27/2021  Imm Admin: PFIZER PURPLE CAP SARS-COV-2 VACCINATION (12+)              Overdue - Annual Wellness Visit (Every 366 Days) Overdue since 2/18/2023 02/17/2022  Visit Dx: Medicare annual wellness visit, subsequent              Overdue - Fasting Lipid Profile (Yearly) Overdue since 11/21/2023 11/21/2022  Lipid Profile    11/04/2021  Lipid Profile    04/10/2019  Lipid Profile    12/04/2017  LIPID PROFILE    11/04/2016  LIPID PROFILE    Only the first 5 history entries have been loaded, but more history exists.              Overdue - Diabetes: Urine Protein Screening (Yearly) Overdue since 11/21/2023 11/21/2022  MICROALBUMIN CREAT RATIO URINE    04/10/2019  MICROALBUMIN CREAT RATIO URINE              Diabetes: Monofilament / LE Exam (Yearly) Due soon on 11/30/2023 11/30/2022  Smart[employer]: WORKFLOW - DIABETES - DIABETIC FOOT EXAM PERFORMED              Postponed - Zoster (Shingles) Vaccines (1 of 2) Postponed until 4/29/2024      No completion history exists for this topic.              Postponed - Influenza Vaccine (1) Postponed until 11/29/2024      No completion history exists for this topic.              A1c Screening (Every 6 Months) Next due on 2/16/2024 08/16/2023  HEMOGLOBIN A1C    03/31/2023  Hemoglobin A1c    11/21/2022  HEMOGLOBIN A1C    08/24/2022  POCT Hemoglobin A1C    05/17/2022  POCT Hemoglobin A1C    Only the first 5 history entries have been loaded, but more history exists.              Diabetes: Retinopathy Screening (Yearly) Next due on 5/25/2024 05/25/2023  AMB EXTERNAL RETINAL SCREENING RESULTS    07/14/2021  RETINAL SCREENING RESULTS    10/06/2016  REFERRAL FOR RETINAL SCREENING EXAM              SERUM CREATININE (Yearly) Next due on 8/16/2024 08/16/2023  Basic Metabolic Panel    03/31/2023  Basic Metabolic Panel    11/21/2022  Comp Metabolic Panel    04/11/2022  Comp Metabolic Panel    02/14/2022  Basic  Metabolic Panel    Only the first 5 history entries have been loaded, but more history exists.              Cervical Cancer Screening (Every 3 Years) Next due on 2/17/2025 02/17/2022  Done - Dr. Fine    06/02/2015  Done - Dr. Boogie              Mammogram (Every 2 Years) Next due on 9/28/2025 09/28/2023  MA-SCREENING MAMMO BILAT W/TOMOSYNTHESIS W/CAD    07/19/2022  MA-SCREENING MAMMO BILAT W/TOMOSYNTHESIS W/CAD    04/05/2021  MA-SCREENING MAMMO BILAT W/TOMOSYNTHESIS W/CAD    03/05/2020  MA-SCREENING MAMMO BILAT W/TOMOSYNTHESIS W/CAD    05/08/2018  MA-MAMMO SCREENING BILAT W/LUCAS W/CAD    Only the first 5 history entries have been loaded, but more history exists.              IMM DTaP/Tdap/Td Vaccine (2 - Td or Tdap) Next due on 4/13/2028 04/13/2018  Imm Admin: Tdap Vaccine              Colorectal Cancer Screening (Colonoscopy - Preferred) Next due on 6/15/2032      06/15/2022  REFERRAL TO GI FOR COLONOSCOPY    06/15/2022  REFERRAL TO GI FOR COLONOSCOPY    12/07/2018  REFERRAL TO GI FOR COLONOSCOPY    07/17/2015  AMB REFERRAL TO GI FOR COLONOSCOPY              Hepatitis A Vaccine (Hep A) (Series Information) Aged Out      No completion history exists for this topic.              HPV Vaccines (Series Information) Aged Out      No completion history exists for this topic.              Polio Vaccine (Inactivated Polio) (Series Information) Aged Out      No completion history exists for this topic.              Meningococcal Immunization (Series Information) Aged Out      No completion history exists for this topic.              Discontinued - Hepatitis C Screening  Discontinued      No completion history exists for this topic.                    Patient Care Team:  CLAUDIO Kovacs as PCP - General (Family Medicine)  [facility] as Consulting Physician (Ophthalmology)  Roberto Fine D.O. (Obstetrics & Gynecology)  Daylin Childs C.N.A. as [facility]         Social  History     Tobacco Use    Smoking status: Former     Current packs/day: 0.00     Average packs/day: 0.5 packs/day for 33.0 years (16.5 ttl pk-yrs)     Types: Cigarettes     Start date: 1977     Quit date: 2010     Years since quittin.7    Smokeless tobacco: Never   Vaping Use    Vaping Use: Never used   Substance Use Topics    Alcohol use: Yes     Comment: Very rarely.    Drug use: Not Currently     Comment: CBD balm for ankles - once in awhile.     Family History   Problem Relation Age of Onset    Cancer Mother         Lung cancer    Cancer Father         Prostate Cancer    Dementia Father     Alcohol abuse Brother     Heart Disease Paternal Uncle     Depression Maternal Aunt     Bipolar disorder Cousin     Diabetes Neg Hx     Stroke Neg Hx      She  has a past medical history of Anxiety, Arthritis, Depression, Diabetes (HCC) (Aug 2022), Fibroid uterus, Infectious disease (2022), Obesity, PONV (postoperative nausea and vomiting), and Snoring.    She has no past medical history of Acute nasopharyngitis, Anesthesia, Anginal syndrome (HCC), Arrhythmia, Asthma, Blood clotting disorder (HCC), Bowel habit changes, Breath shortness, Bronchitis, Cancer (HCC), Carcinoma in situ of respiratory system, Cataract, Congestive heart failure (HCC), Continuous ambulatory peritoneal dialysis status (HCC), Coughing blood, Dental disorder, Dialysis patient (HCC), Disorder of thyroid, Emphysema of lung (HCC), Glaucoma, Heart burn, Heart murmur, Heart valve disease, Hemorrhagic disorder (HCC), Hepatitis A, Hepatitis B, Hepatitis C, Hiatus hernia syndrome, High cholesterol, Hypertension, Indigestion, Jaundice, Myocardial infarct (HCC), Pacemaker, Pain, Pneumonia, Pregnant, Renal disorder, Rheumatic fever, Seizure (HCC), Sleep apnea, Stroke (HCC), Tuberculosis, Urinary bladder disorder, or Urinary incontinence.   Past Surgical History:   Procedure Laterality Date    KAM BY LAPAROSCOPY N/A 2023    Procedure:  "LAPAROSCOPIC CHOLECYSTECTOMY.;  Surgeon: Oswaldo Smith M.D.;  Location: SURGERY [facility];  Service: General    HYSTEROSCOPY WITH MYOSURE N/A 03/01/2022    Procedure: HYSTEROSCOPY, WITH TISSUE REMOVAL, USING HYSTEROSCOPIC ROTATING CUTTER BLADE - POLYPECTOMY;  Surgeon: Roberto Fine D.O.;  Location: SURGERY SAME DAY [facility];  Service: Obstetrics    DILATION AND CURETTAGE N/A 03/01/2022    Procedure: DILATION AND CURETTAGE;  Surgeon: Roberto Fine D.O.;  Location: SURGERY SAME DAY [facility];  Service: Obstetrics    HYSTEROSCOPY WITH VIDEO DIAGNOSTIC      OTHER  March 2022    Spotting since 2016 -- D&C       Exam:   /72 (BP Location: Right arm, Patient Position: Sitting, BP Cuff Size: Large adult)   Pulse 68   Temp (!) 35.7 °C (96.3 °F)   Resp 16   Ht 1.727 m (5' 8\")   Wt 106 kg (233 lb)   SpO2 96%  Body mass index is 35.43 kg/m².    Hearing excellent.    Dentition good  Alert, oriented in no acute distress.  Eye contact is good, speech goal directed, affect calm  CV: Regular rate and rhythm  Respiratory: Clear to auscultation bilaterally    Assessment and Plan. The following treatment and monitoring plan is recommended:    \"  1. Medicare annual wellness visit, subsequent        2. Asymptomatic menopause  DS-BONE DENSITY STUDY (DEXA)      3. Need for pneumococcal vaccine  Pneumococcal Conjugate Vaccine 20-Valent (6 wks+)      4. Type 2 diabetes mellitus without complication, without long-term current use of insulin (HCC)  CBC WITH DIFFERENTIAL    Comp Metabolic Panel    HEMOGLOBIN A1C    MICROALBUMIN CREAT RATIO URINE      5. Elevated LDL cholesterol level  Comp Metabolic Panel    Lipid Profile    TSH WITH REFLEX TO FT4      6. Vitamin D deficiency  Comp Metabolic Panel    VITAMIN D,25 HYDROXY (DEFICIENCY)      7. Toenail deformity  Referral to Podiatry      8. Thyroid nodule - neg biopsy 2014.  US 2018.          9. Morbid (severe) obesity due to excess calories (HCC)        10. Body mass index " (BMI) 35.0-35.9, adult        11. Status post cholecystectomy -April 2023-        12. MDD (major depressive disorder), recurrent, in full remission (HCC)        All conditions stable.   Updated PCV 20 and counseled regarding the importance of this vaccine as well as potential side effects and benefits.  Recommend a full updated lab panel in February.  Recommend updated bone density.  Mammogram up-to-date and reviewed.  Referred to podiatry for toenails, failed topical Penlac.  Encouraged her to continue to work on portion control, exercise and weight loss.  Depression very well-controlled with duloxetine.  She has an appoint with dermatology for skin check.  Follow-up 6 months regarding type 2 diabetes.    Services suggested: No services needed at this time  Health Care Screening: Age-appropriate preventive services recommended by USPTF and ACIP covered by Medicare were discussed today. Services ordered if indicated and agreed upon by the patient.  Referrals offered: Community-based lifestyle interventions to reduce health risks and promote self-management and wellness, fall prevention, nutrition, physical activity, tobacco-use cessation, weight loss, and mental health services as per orders if indicated.    Discussion today about general wellness and lifestyle habits:    Prevent falls and reduce trip hazards; Cautioned about securing or removing rugs.  Have a working fire alarm and carbon monoxide detector;   Engage in regular physical activity and social activities     Follow-up: 6 mo.

## 2023-12-28 ENCOUNTER — OFFICE VISIT (OUTPATIENT)
Dept: DERMATOLOGY | Facility: IMAGING CENTER | Age: 63
End: 2023-12-28
Payer: MEDICARE

## 2023-12-28 DIAGNOSIS — D22.9 MULTIPLE NEVI: ICD-10-CM

## 2023-12-28 DIAGNOSIS — L82.1 SK (SEBORRHEIC KERATOSIS): ICD-10-CM

## 2023-12-28 DIAGNOSIS — L81.4 LENTIGINES: ICD-10-CM

## 2023-12-28 DIAGNOSIS — L91.8 ACROCHORDON: ICD-10-CM

## 2023-12-28 DIAGNOSIS — Z12.83 SKIN CANCER SCREENING: ICD-10-CM

## 2023-12-28 DIAGNOSIS — D23.9 DERMATOFIBROMA: ICD-10-CM

## 2023-12-28 DIAGNOSIS — D18.01 CHERRY ANGIOMA: ICD-10-CM

## 2023-12-28 DIAGNOSIS — E11.9 TYPE 2 DIABETES MELLITUS WITHOUT COMPLICATION, WITHOUT LONG-TERM CURRENT USE OF INSULIN (HCC): ICD-10-CM

## 2023-12-28 PROCEDURE — 99213 OFFICE O/P EST LOW 20 MIN: CPT | Performed by: NURSE PRACTITIONER

## 2023-12-28 NOTE — PROGRESS NOTES
DERMATOLOGY NOTE  NEW VISIT       Chief complaint: Establish Care (CHASIDY)     Skin tags on axillas aware could be cosmetic,.    HPI/location: lt leg  pink lesions  Time present:  Painful lesion: No  Itching lesion: No  Enlarging lesion: No  Anything make it better or worse?      History of skin cancer: No  History of precancers/actinic keratoses: No  History of biopsies:No  History of blistering/severe sunburns:Yes, Details: teens   Family history of skin cancer:Yes, Details: father unknown type   Family history of atypical moles:No    Allergies   Allergen Reactions    Augmentin [Amoxicillin-Pot Clavulanate] Diarrhea    Sulfa Drugs      Rash     MEDICATIONS:  Medications relevant to specialty reviewed.     REVIEW OF SYSTEMS:   Positive for skin (see HPI)  Negative for fevers and chills     EXAM:  LMP 12/27/2016   Constitutional: Well-developed, well-nourished, and in no distress.     A total body skin exam was performed excluding the genitals per patient preference and including the following areas: head (including face), neck, chest, abdomen, groin/buttocks, back, bilateral upper extremities, and bilateral lower extremities with the following pertinent findings listed below and/or in assessment/plan.     -sun exposed skin of trunk and b/l upper, lower extremities and face with scattered clinically benign light brown reticulated macules all of which were morphologically similar and none of which were suspicious for skin cancer today on exam  -Several scattered 1-3mm bright red macules and thin papules on the trunk and extremities  -Multiple tan and medium brown skin-colored macules papules scattered over the trunk >> extremities--All with benign-appearing pigment network patterns on dermoscopy  -Several medium brown stuck-on waxy papules scattered on the trunk and extremities  -skin tags bilateral axilla   -DF right lateral thigh   -Monofilament testing with a 10 gram force: sensation intact: intact  bilaterally  Visual Inspection: Feet without maceration, ulcers, fissures.  Pedal pulses: intact bilaterally      IMPRESSION / PLAN:    1. Lentigines  - Benign-appearing nature of lesions discussed during exam.   - Advised to continue to monitor for any return to clinic for new or concerning changes.      2. Cherry angioma  - Benign-appearing nature of lesions discussed during exam.   - Advised to continue to monitor for any return to clinic for new or concerning changes.      3. Multiple nevi  - Benign-appearing nature of lesions discussed during exam.   - Advised to continue to monitor for any return to clinic for new or concerning changes.  - ABCDE's of melanoma discussed/handout given      4. SK (seborrheic keratosis)  - Benign-appearing nature of lesions discussed during exam.   - Advised to continue to monitor for any return to clinic for new or concerning changes.      5. Acrochordon  - Benign-appearing nature of lesions discussed during exam.   - courtesy LN2 applied to 2 tags bilateral axilla for cosmesis  - Advised to continue to monitor for any return to clinic for new or concerning changes.      6. Dermatofibroma  - Benign-appearing nature of lesions discussed during exam.   - Advised to continue to monitor for any return to clinic for new or concerning changes.      7. Type 2 diabetes mellitus without complication, without long-term current use of insulin (HCC)  Continue care per PCP  - Diabetic Monofilament Lower Extremity Exam    8. Skin cancer screening  Skin cancer education  discussed importance of sun protective clothing, eyewear in addition to the use of broad spectrum sunscreen with SPF 30 or greater, as well as need for reapplication ~every 2 hours when exposed to UVR/handout given  discussed importance following up for any new or changing lesions as noted in handout given, but every 12 months exams in clinic in the setting of dermatologic history  ABCDE's of melanoma discussed/handout  given        Discussed risks associated with LN2, Patient verbalized understanding and agrees with plan regarding the above        Please note that this dictation was created using voice recognition software. I have made every reasonable attempt to correct obvious errors, but I expect that there are errors of grammar and possibly content that I did not discover before finalizing the note.      Return to clinic in: Return in about 1 year (around 12/28/2024) for CHASIDY. and as needed for any new or changing skin lesions.        no

## 2024-04-16 ENCOUNTER — PATIENT MESSAGE (OUTPATIENT)
Dept: MEDICAL GROUP | Facility: LAB | Age: 64
End: 2024-04-16
Payer: MEDICARE

## 2024-04-16 DIAGNOSIS — L30.9 ECZEMA, UNSPECIFIED TYPE: ICD-10-CM

## 2024-04-16 NOTE — PROGRESS NOTES
Received request via: Patient    Was the patient seen in the last year in this department? Yes    Does the patient have an active prescription (recently filled or refills available) for medication(s) requested? No    Pharmacy Name: [facility] [address] [facility] [address]     Does the patient have jail Plus and need 100 day supply (blood pressure, diabetes and cholesterol meds only)? Medication is not for cholesterol, blood pressure or diabetes

## 2024-04-17 RX ORDER — TRIAMCINOLONE ACETONIDE 0.25 MG/G
1 CREAM TOPICAL 2 TIMES DAILY
Qty: 45 G | Refills: 2 | Status: SHIPPED | OUTPATIENT
Start: 2024-04-17

## 2024-08-15 RX ORDER — DULOXETIN HYDROCHLORIDE 60 MG/1
60 CAPSULE, DELAYED RELEASE ORAL
Qty: 90 CAPSULE | Refills: 1 | Status: SHIPPED | OUTPATIENT
Start: 2024-08-15

## 2024-08-15 NOTE — TELEPHONE ENCOUNTER
Received request via: Pharmacy    Was the patient seen in the last year in this department? Yes11/29/23    Does the patient have an active prescription (recently filled or refills available) for medication(s) requested? No    [facility]    Does the patient have alf Plus and need 100-day supply? (This applies to ALL medications)

## 2024-08-19 ENCOUNTER — OFFICE VISIT (OUTPATIENT)
Dept: MEDICAL GROUP | Facility: LAB | Age: 64
End: 2024-08-19
Payer: MEDICARE

## 2024-08-19 VITALS
SYSTOLIC BLOOD PRESSURE: 140 MMHG | BODY MASS INDEX: 36.68 KG/M2 | HEIGHT: 68 IN | DIASTOLIC BLOOD PRESSURE: 82 MMHG | WEIGHT: 242 LBS | RESPIRATION RATE: 16 BRPM | OXYGEN SATURATION: 97 % | TEMPERATURE: 98 F | HEART RATE: 76 BPM

## 2024-08-19 DIAGNOSIS — G89.29 CHRONIC BILATERAL LOW BACK PAIN WITH BILATERAL SCIATICA: ICD-10-CM

## 2024-08-19 DIAGNOSIS — M54.41 CHRONIC BILATERAL LOW BACK PAIN WITH BILATERAL SCIATICA: ICD-10-CM

## 2024-08-19 DIAGNOSIS — E66.01 MORBID (SEVERE) OBESITY DUE TO EXCESS CALORIES (HCC): ICD-10-CM

## 2024-08-19 DIAGNOSIS — M54.42 CHRONIC BILATERAL LOW BACK PAIN WITH BILATERAL SCIATICA: ICD-10-CM

## 2024-08-19 DIAGNOSIS — F33.42 MDD (MAJOR DEPRESSIVE DISORDER), RECURRENT, IN FULL REMISSION (HCC): ICD-10-CM

## 2024-08-19 PROCEDURE — 99214 OFFICE O/P EST MOD 30 MIN: CPT | Performed by: NURSE PRACTITIONER

## 2024-08-19 PROCEDURE — 3077F SYST BP >= 140 MM HG: CPT | Performed by: NURSE PRACTITIONER

## 2024-08-19 PROCEDURE — 3079F DIAST BP 80-89 MM HG: CPT | Performed by: NURSE PRACTITIONER

## 2024-08-19 ASSESSMENT — PATIENT HEALTH QUESTIONNAIRE - PHQ9: CLINICAL INTERPRETATION OF PHQ2 SCORE: 0

## 2024-08-19 ASSESSMENT — FIBROSIS 4 INDEX: FIB4 SCORE: 1.24

## 2024-08-19 NOTE — PATIENT INSTRUCTIONS
ALEK DAO  [address] [place] [id number]  Phone: [phone]    Fast for 8-10 hrs for lab work.  Orders are in the computer - ok to go to [facility] - your orders will  2024.

## 2024-08-19 NOTE — PROGRESS NOTES
"Verbal consent was acquired by the patient to use [employer] ambient listening note generation during this visit Yes      Subjective   Amy Denson is a 64 y.o. female who presents for back pain  History of Present Illness  The patient is a 64-year-old established female who presents for evaluation of back pain.     Her primary concern today is low back pain that radiates down her buttocks into her leg, particularly when standing or stretching. She reports intermittent pain that radiates down her buttocks, the back of her thigh, and into her calf or foot, especially noticeable in the morning. She finds it difficult to get up and down, often needing to use her arms for support. She has previously undergone physical therapy with [facility] years ago and uses orthopedically approved Vionic shoes for mobility. She also mentions occasional left hip pain, which she attributes to menopause. She does not experience shooting pain from her spine to her knee. Additionally, she reports right knee pain.  She has sought chiropractic treatment in the past and is currently on [facility] due to her SSDI status. She maintains an active lifestyle, walking daily and attempting water exercises.  She stopped working in march.     She continues to struggle with a fungal infection and plans to revisit a podiatrist. She is not currently taking any pain medication.    She has not yet had her blood work done due to difficulties in scheduling. She is making efforts to manage her weight and maintain a healthy lifestyle.    SOCIAL HISTORY  She is not working anymore. She stopped working in 03/2024.    Review of Systems  Neg except hpi  Objective   BP (!) 140/82 (BP Location: Left arm, Patient Position: Sitting, BP Cuff Size: Large adult)   Pulse 76   Temp 36.7 °C (98 °F)   Resp 16   Ht 1.727 m (5' 8\")   Wt 110 kg (242 lb)   SpO2 97%   Physical Exam  Gen: NAD  Resp: nonlabored.  Able to speak in full sentences  Psy: " pleasant / cooperative.   Neuro:  Alert and oriented x 3         Assessment & Plan  1.  Worsening on chronic low back pain:  Recommend x-ray of her lumbar spine, reestablishing with physical therapy, weight loss, trunk strengthening, stretches, proper lifting techniques.  I would like her to follow-up here in 3 months after she has had several weeks of PT.  I will follow-up with her after her x-ray returns.  Discussed a healthy diet such as a high-fiber, vegetable based, lean protein diet.  We should certainly consider MRI and physiatry in the future if pain is not improving with physical therapy alone.    2.  Toe fungal infection.  Referral to podiatry was approved in November and she was provided the number so that she can set up an appointment.    3. Prediabetes.  Last A1c was 6.1 last November.  I encouraged her to update her labs.  Discussed a diabetic diet, weight loss and exercise.    Follow-up  The patient will follow up in 3 months for her annual Medicare wellness visit and to monitor her back condition.       HCC Gap Form    Diagnosis: E66.01 - Morbid (severe) obesity due to excess calories ([facility])  Z68.36 - Body mass index (BMI) 36.0-36.9, adult  Comorbidity Diagnosis: Type 2 diabetes mellitus without complication, without long-term current use of insulin (HCC)  The current BMI is 36.80 kg/m2 as of 08/19/24 14:59 PDT  Assessment and plan: Chronic, worsening. Encouraged healthy diet and physical activity changes with a goal of weight loss. Follow up at least annually. The comorbid condition is worsening  based on today's assessment. Continue current treatment plan including control of risk factors. Follow up in 3 months.  Diagnosis: F33.42 - MDD (major depressive disorder), recurrent, in full remission (HCC)  Assessment and plan: Chronic, stable. Continue with current defined treatment plan: stable. Follow-up at least annually.  Last edited 08/19/24 14:59 PDT by CLAUDIO Kovacs                Please note that this dictation was created using voice recognition software. I have made every reasonable attempt to correct obvious errors, but I expect that there are errors of grammar and possibly content that I did not discover before finalizing the note.

## 2024-08-20 ENCOUNTER — APPOINTMENT (OUTPATIENT)
Dept: RADIOLOGY | Facility: MEDICAL CENTER | Age: 64
End: 2024-08-20
Attending: NURSE PRACTITIONER
Payer: MEDICARE

## 2024-08-20 DIAGNOSIS — G89.29 CHRONIC BILATERAL LOW BACK PAIN WITH BILATERAL SCIATICA: ICD-10-CM

## 2024-08-20 DIAGNOSIS — M54.42 CHRONIC BILATERAL LOW BACK PAIN WITH BILATERAL SCIATICA: ICD-10-CM

## 2024-08-20 DIAGNOSIS — M54.41 CHRONIC BILATERAL LOW BACK PAIN WITH BILATERAL SCIATICA: ICD-10-CM

## 2024-08-20 PROCEDURE — 72100 X-RAY EXAM L-S SPINE 2/3 VWS: CPT

## 2024-09-04 ENCOUNTER — PATIENT MESSAGE (OUTPATIENT)
Dept: HEALTH INFORMATION MANAGEMENT | Facility: OTHER | Age: 64
End: 2024-09-04

## 2024-10-03 ENCOUNTER — PATIENT MESSAGE (OUTPATIENT)
Dept: HEALTH INFORMATION MANAGEMENT | Facility: OTHER | Age: 64
End: 2024-10-03

## 2024-10-10 ENCOUNTER — PATIENT MESSAGE (OUTPATIENT)
Dept: HEALTH INFORMATION MANAGEMENT | Facility: OTHER | Age: 64
End: 2024-10-10

## 2024-10-11 ENCOUNTER — HOSPITAL ENCOUNTER (OUTPATIENT)
Dept: RADIOLOGY | Facility: MEDICAL CENTER | Age: 64
End: 2024-10-11
Attending: NURSE PRACTITIONER
Payer: MEDICARE

## 2024-10-11 DIAGNOSIS — Z12.31 VISIT FOR SCREENING MAMMOGRAM: ICD-10-CM

## 2024-10-11 PROCEDURE — 77067 SCR MAMMO BI INCL CAD: CPT

## 2024-10-14 ENCOUNTER — HOSPITAL ENCOUNTER (OUTPATIENT)
Dept: LAB | Facility: MEDICAL CENTER | Age: 64
End: 2024-10-14
Attending: NURSE PRACTITIONER
Payer: MEDICARE

## 2024-10-14 DIAGNOSIS — E11.9 TYPE 2 DIABETES MELLITUS WITHOUT COMPLICATION, WITHOUT LONG-TERM CURRENT USE OF INSULIN (HCC): ICD-10-CM

## 2024-10-14 DIAGNOSIS — E55.9 VITAMIN D DEFICIENCY: ICD-10-CM

## 2024-10-14 DIAGNOSIS — E78.00 ELEVATED LDL CHOLESTEROL LEVEL: ICD-10-CM

## 2024-10-14 LAB
25(OH)D3 SERPL-MCNC: 17 NG/ML (ref 30–100)
ALBUMIN SERPL BCP-MCNC: 4.2 G/DL (ref 3.2–4.9)
ALBUMIN/GLOB SERPL: 1.2 G/DL
ALP SERPL-CCNC: 78 U/L (ref 30–99)
ALT SERPL-CCNC: 18 U/L (ref 2–50)
ANION GAP SERPL CALC-SCNC: 13 MMOL/L (ref 7–16)
AST SERPL-CCNC: 21 U/L (ref 12–45)
BASOPHILS # BLD AUTO: 1.1 % (ref 0–1.8)
BASOPHILS # BLD: 0.07 K/UL (ref 0–0.12)
BILIRUB SERPL-MCNC: 0.5 MG/DL (ref 0.1–1.5)
BUN SERPL-MCNC: 18 MG/DL (ref 8–22)
CALCIUM ALBUM COR SERPL-MCNC: 9.7 MG/DL (ref 8.5–10.5)
CALCIUM SERPL-MCNC: 9.9 MG/DL (ref 8.5–10.5)
CHLORIDE SERPL-SCNC: 101 MMOL/L (ref 96–112)
CHOLEST SERPL-MCNC: 192 MG/DL (ref 100–199)
CO2 SERPL-SCNC: 24 MMOL/L (ref 20–33)
CREAT SERPL-MCNC: 0.72 MG/DL (ref 0.5–1.4)
EOSINOPHIL # BLD AUTO: 0.29 K/UL (ref 0–0.51)
EOSINOPHIL NFR BLD: 4.6 % (ref 0–6.9)
ERYTHROCYTE [DISTWIDTH] IN BLOOD BY AUTOMATED COUNT: 42.8 FL (ref 35.9–50)
EST. AVERAGE GLUCOSE BLD GHB EST-MCNC: 126 MG/DL
GFR SERPLBLD CREATININE-BSD FMLA CKD-EPI: 94 ML/MIN/1.73 M 2
GLOBULIN SER CALC-MCNC: 3.4 G/DL (ref 1.9–3.5)
GLUCOSE SERPL-MCNC: 117 MG/DL (ref 65–99)
HBA1C MFR BLD: 6 % (ref 4–5.6)
HCT VFR BLD AUTO: 50.4 % (ref 37–47)
HDLC SERPL-MCNC: 43 MG/DL
HGB BLD-MCNC: 15.9 G/DL (ref 12–16)
IMM GRANULOCYTES # BLD AUTO: 0.01 K/UL (ref 0–0.11)
IMM GRANULOCYTES NFR BLD AUTO: 0.2 % (ref 0–0.9)
LDLC SERPL CALC-MCNC: 127 MG/DL
LYMPHOCYTES # BLD AUTO: 2.25 K/UL (ref 1–4.8)
LYMPHOCYTES NFR BLD: 35.3 % (ref 22–41)
MCH RBC QN AUTO: 29.6 PG (ref 27–33)
MCHC RBC AUTO-ENTMCNC: 31.5 G/DL (ref 32.2–35.5)
MCV RBC AUTO: 93.9 FL (ref 81.4–97.8)
MONOCYTES # BLD AUTO: 0.45 K/UL (ref 0–0.85)
MONOCYTES NFR BLD AUTO: 7.1 % (ref 0–13.4)
NEUTROPHILS # BLD AUTO: 3.3 K/UL (ref 1.82–7.42)
NEUTROPHILS NFR BLD: 51.7 % (ref 44–72)
NRBC # BLD AUTO: 0 K/UL
NRBC BLD-RTO: 0 /100 WBC (ref 0–0.2)
PLATELET # BLD AUTO: 287 K/UL (ref 164–446)
PMV BLD AUTO: 8.9 FL (ref 9–12.9)
POTASSIUM SERPL-SCNC: 4.4 MMOL/L (ref 3.6–5.5)
PROT SERPL-MCNC: 7.6 G/DL (ref 6–8.2)
RBC # BLD AUTO: 5.37 M/UL (ref 4.2–5.4)
SODIUM SERPL-SCNC: 138 MMOL/L (ref 135–145)
TRIGL SERPL-MCNC: 112 MG/DL (ref 0–149)
TSH SERPL DL<=0.005 MIU/L-ACNC: 1.85 UIU/ML (ref 0.38–5.33)
WBC # BLD AUTO: 6.4 K/UL (ref 4.8–10.8)

## 2024-10-14 PROCEDURE — 36415 COLL VENOUS BLD VENIPUNCTURE: CPT

## 2024-10-14 PROCEDURE — 80053 COMPREHEN METABOLIC PANEL: CPT

## 2024-10-14 PROCEDURE — 82043 UR ALBUMIN QUANTITATIVE: CPT

## 2024-10-14 PROCEDURE — 84443 ASSAY THYROID STIM HORMONE: CPT

## 2024-10-14 PROCEDURE — 80061 LIPID PANEL: CPT

## 2024-10-14 PROCEDURE — 82570 ASSAY OF URINE CREATININE: CPT

## 2024-10-14 PROCEDURE — 85025 COMPLETE CBC W/AUTO DIFF WBC: CPT

## 2024-10-14 PROCEDURE — 83036 HEMOGLOBIN GLYCOSYLATED A1C: CPT

## 2024-10-14 PROCEDURE — 82306 VITAMIN D 25 HYDROXY: CPT

## 2024-10-15 LAB
CREAT UR-MCNC: 175 MG/DL
MICROALBUMIN UR-MCNC: <1.2 MG/DL
MICROALBUMIN/CREAT UR: NORMAL MG/G (ref 0–30)

## 2024-10-31 ENCOUNTER — TELEPHONE (OUTPATIENT)
Dept: HEALTH INFORMATION MANAGEMENT | Facility: OTHER | Age: 64
End: 2024-10-31
Payer: MEDICARE

## 2024-10-31 PROBLEM — M54.40 CHRONIC LOW BACK PAIN WITH SCIATICA: Status: ACTIVE | Noted: 2024-10-31

## 2024-10-31 PROBLEM — G89.29 CHRONIC LOW BACK PAIN WITH SCIATICA: Status: ACTIVE | Noted: 2024-10-31

## 2024-10-31 PROBLEM — E78.2 MIXED HYPERLIPIDEMIA: Status: ACTIVE | Noted: 2024-10-31

## 2024-10-31 ASSESSMENT — PATIENT HEALTH QUESTIONNAIRE - PHQ9
2. FEELING DOWN, DEPRESSED, IRRITABLE, OR HOPELESS: NOT AT ALL
1. LITTLE INTEREST OR PLEASURE IN DOING THINGS: NOT AT ALL

## 2024-10-31 ASSESSMENT — ENCOUNTER SYMPTOMS: GENERAL WELL-BEING: GOOD

## 2024-10-31 ASSESSMENT — ACTIVITIES OF DAILY LIVING (ADL): BATHING_REQUIRES_ASSISTANCE: 0

## 2024-11-01 ENCOUNTER — APPOINTMENT (OUTPATIENT)
Dept: FAMILY PLANNING/WOMEN'S HEALTH CLINIC | Facility: PHYSICIAN GROUP | Age: 64
End: 2024-11-01
Payer: MEDICARE

## 2024-11-01 VITALS
DIASTOLIC BLOOD PRESSURE: 72 MMHG | SYSTOLIC BLOOD PRESSURE: 120 MMHG | HEIGHT: 68 IN | WEIGHT: 231 LBS | BODY MASS INDEX: 35.01 KG/M2

## 2024-11-01 DIAGNOSIS — E66.812 OBESITY, CLASS II, BMI 35-39.9: ICD-10-CM

## 2024-11-01 DIAGNOSIS — F33.42 MDD (MAJOR DEPRESSIVE DISORDER), RECURRENT, IN FULL REMISSION (HCC): ICD-10-CM

## 2024-11-01 DIAGNOSIS — G89.29 CHRONIC BILATERAL LOW BACK PAIN WITH SCIATICA, SCIATICA LATERALITY UNSPECIFIED: ICD-10-CM

## 2024-11-01 DIAGNOSIS — R73.03 PREDIABETES: ICD-10-CM

## 2024-11-01 DIAGNOSIS — M54.40 CHRONIC BILATERAL LOW BACK PAIN WITH SCIATICA, SCIATICA LATERALITY UNSPECIFIED: ICD-10-CM

## 2024-11-01 DIAGNOSIS — E66.01 MORBID (SEVERE) OBESITY DUE TO EXCESS CALORIES (HCC): ICD-10-CM

## 2024-11-01 DIAGNOSIS — E78.2 MIXED HYPERLIPIDEMIA: ICD-10-CM

## 2024-11-01 PROCEDURE — 3074F SYST BP LT 130 MM HG: CPT

## 2024-11-01 PROCEDURE — 3078F DIAST BP <80 MM HG: CPT

## 2024-11-01 PROCEDURE — 1126F AMNT PAIN NOTED NONE PRSNT: CPT

## 2024-11-01 PROCEDURE — G0439 PPPS, SUBSEQ VISIT: HCPCS

## 2024-11-01 RX ORDER — VITAMIN B COMPLEX
1000 TABLET ORAL DAILY
COMMUNITY

## 2024-11-01 SDOH — ECONOMIC STABILITY: FOOD INSECURITY: WITHIN THE PAST 12 MONTHS, YOU WORRIED THAT YOUR FOOD WOULD RUN OUT BEFORE YOU GOT THE MONEY TO BUY MORE.: NEVER TRUE

## 2024-11-01 SDOH — ECONOMIC STABILITY: FOOD INSECURITY: WITHIN THE PAST 12 MONTHS, THE FOOD YOU BOUGHT JUST DIDN'T LAST AND YOU DIDN'T HAVE MONEY TO GET MORE.: NEVER TRUE

## 2024-11-01 SDOH — ECONOMIC STABILITY: FOOD INSECURITY: HOW HARD IS IT FOR YOU TO PAY FOR THE VERY BASICS LIKE FOOD, HOUSING, MEDICAL CARE, AND HEATING?: NOT HARD AT ALL

## 2024-11-01 SDOH — ECONOMIC STABILITY: HOUSING INSECURITY: AT ANY TIME IN THE PAST 12 MONTHS, WERE YOU HOMELESS OR LIVING IN A SHELTER (INCLUDING NOW)?: NO

## 2024-11-01 SDOH — ECONOMIC STABILITY: HOUSING INSECURITY: IN THE PAST 12 MONTHS, HOW MANY TIMES HAVE YOU MOVED WHERE YOU WERE LIVING?: 1

## 2024-11-01 SDOH — ECONOMIC STABILITY: HOUSING INSECURITY: IN THE LAST 12 MONTHS, WAS THERE A TIME WHEN YOU WERE NOT ABLE TO PAY THE MORTGAGE OR RENT ON TIME?: NO

## 2024-11-01 SDOH — ECONOMIC STABILITY: TRANSPORTATION INSECURITY: IN THE PAST 12 MONTHS, HAS LACK OF TRANSPORTATION KEPT YOU FROM MEDICAL APPOINTMENTS OR FROM GETTING MEDICATIONS?: NO

## 2024-11-01 ASSESSMENT — ACTIVITIES OF DAILY LIVING (ADL): LACK_OF_TRANSPORTATION: NO

## 2024-11-01 ASSESSMENT — PATIENT HEALTH QUESTIONNAIRE - PHQ9: CLINICAL INTERPRETATION OF PHQ2 SCORE: 0

## 2024-11-01 ASSESSMENT — FIBROSIS 4 INDEX: FIB4 SCORE: 1.07

## 2024-11-01 ASSESSMENT — PAIN SCALES - GENERAL: PAINLEVEL: NO PAIN

## 2024-11-01 NOTE — ASSESSMENT & PLAN NOTE
Chronic, stable. PHQ-9 in office today is ____. Currently taking cymbalta 60 mg daily. States she feels this has well controlled her mood. She sees psych?? Therapy?? Follow up with PCP as needed.

## 2024-11-01 NOTE — ASSESSMENT & PLAN NOTE
Chronic, decompensated. She saw her pcp in August who recommended PT, stretching, and proper body mechanics. Discussed physical activity. Currently does __ for pain. Did she do PT?

## 2024-11-01 NOTE — PROGRESS NOTES
Comprehensive Health Assessment Program     Amy Denson is a 64 y.o. here for her comprehensive health assessment.    Patient Active Problem List    Diagnosis Date Noted    Mixed hyperlipidemia 10/31/2024    Chronic low back pain with sciatica 10/31/2024    Status post cholecystectomy -2023- 2023    Obesity, Class II, BMI 35-39.9 2022    Prediabetes 2017    MDD (major depressive disorder), recurrent, in full remission (HCC) 2017    Thyroid nodule - neg biopsy .  US .   2015       Current Outpatient Medications   Medication Sig Dispense Refill    vitamin D3 (CHOLECALCIFEROL) 1000 Unit (25 mcg) Tab Take 1,000 Units by mouth every day.      DULoxetine (CYMBALTA) 60 MG Cap DR Particles delayed-release capsule TAKE 1 CAPSULE BY MOUTH EVERY DAY 90 Capsule 1    Blood Glucose Test Strips Test strips order: Test strips for meter covered by insurance. Sig: use once daily in the morning prior to eating and prn ssx high or low sugar #100 RF x 3 100 Strip 5    Lancets Lancets order: Lancets for meter covered by insurance . Sig: use once daily in the morning and prn ssx high or low sugar. #100 RF x 3 100 Each 3    triamcinolone acetonide (KENALOG) 0.025 % Cream Apply 1 Application  topically 2 times a day. 45 g 2     No current facility-administered medications for this visit.          Current supplements as per medication list.     Allergies:   Augmentin [amoxicillin-pot clavulanate] and Sulfa drugs  Social History     Tobacco Use    Smoking status: Former     Current packs/day: 0.00     Average packs/day: 0.5 packs/day for 33.0 years (16.5 ttl pk-yrs)     Types: Cigarettes     Start date: 1977     Quit date: 2010     Years since quittin.7    Smokeless tobacco: Never   Vaping Use    Vaping status: Never Used   Substance Use Topics    Alcohol use: Yes     Comment: Very rarely.    Drug use: Not Currently     Comment: CBD balm for ankles - once in awhile.      Family History   Problem Relation Age of Onset    Cancer Mother         Lung cancer    Cancer Father         Prostate Cancer    Dementia Father     Alcohol abuse Brother     Heart Disease Paternal Uncle     Depression Maternal Aunt     Bipolar disorder Cousin     Diabetes Neg Hx     Stroke Neg Hx      Amy  has a past medical history of Anxiety, Arthritis, Depression, Diabetes (HCC) (Aug 2022), Fibroid uterus, Infectious disease (01/12/2022), Obesity, PONV (postoperative nausea and vomiting), and Snoring.    She has no past medical history of Acute nasopharyngitis, Anesthesia, Anginal syndrome (HCC), Arrhythmia, Asthma, Blood clotting disorder (HCC), Bowel habit changes, Breath shortness, Bronchitis, Cancer (HCC), Carcinoma in situ of respiratory system, Cataract, Congestive heart failure (HCC), Continuous ambulatory peritoneal dialysis status (HCC), Coughing blood, Dental disorder, Dialysis patient (HCC), Emphysema of lung (HCC), Glaucoma, Heart burn, Heart murmur, Heart valve disease, Hemorrhagic disorder (HCC), Hepatitis A, Hepatitis B, Hepatitis C, Hiatus hernia syndrome, High cholesterol, Hypertension, Indigestion, Jaundice, Myocardial infarct (HCC), Pacemaker, Pain, Pneumonia, Pregnant, Renal disorder, Rheumatic fever, Seizure (HCC), Sleep apnea, Stroke (HCC), Tuberculosis, Urinary bladder disorder, or Urinary incontinence.   Past Surgical History:   Procedure Laterality Date    KAM BY LAPAROSCOPY N/A 4/5/2023    Procedure: LAPAROSCOPIC CHOLECYSTECTOMY.;  Surgeon: Oswaldo Smith M.D.;  Location: SURGERY [facility];  Service: General    HYSTEROSCOPY WITH MYOSURE N/A 03/01/2022    Procedure: HYSTEROSCOPY, WITH TISSUE REMOVAL, USING HYSTEROSCOPIC ROTATING CUTTER BLADE - POLYPECTOMY;  Surgeon: Roberto Fine D.O.;  Location: SURGERY SAME DAY [facility];  Service: Obstetrics    DILATION AND CURETTAGE N/A 03/01/2022    Procedure: DILATION AND CURETTAGE;  Surgeon: Roberto Fine D.O.;  Location: SURGERY  SAME DAY [facility];  Service: Obstetrics    HYSTEROSCOPY WITH VIDEO DIAGNOSTIC      OTHER  March 2022    Spotting since 2016 -- D&C       Screening:  In the last six months have you experienced any leakage of urine? Yes does not have to wear a pad all the time.     Depression Screening  Little interest or pleasure in doing things?  0 - not at all  Feeling down, depressed , or hopeless? 0 - not at all  Patient Health Questionnaire Score: 0     If depressive symptoms identified deferred to follow up visit unless specifically addressed in assessment and plan.    Interpretation of PHQ-9 Total Score   Score Severity   1-4 No Depression   5-9 Mild Depression   10-14 Moderate Depression   15-19 Moderately Severe Depression   20-27 Severe Depression    Screening for Cognitive Impairment  Do you or any of your friends or family members have any concern about your memory? No  Three Minute Recall (Leader, Season, Table) 3/3    Yuri clock face with all 12 numbers and set the hands to show 10 minutes after 11.  Yes    Cognitive concerns identified deferred for follow up unless specifically addressed in assessment and plan.    Fall Risk Assessment  Has the patient had two or more falls in the last year or any fall with injury in the last year?  No    Safety Assessment  Do you always wear your seatbelt?  Yes  Any changes to home needed to function safely? No  Difficulty hearing.  No  Patient counseled about all safety risks that were identified.    Functional Assessment ADLs  Are there any barriers preventing you from cooking for yourself or meeting nutritional needs?  No.    Are there any barriers preventing you from driving safely or obtaining transportation?  No.    Are there any barriers preventing you from using a telephone or calling for help?  No    Are there any barriers preventing you from shopping?  No.    Are there any barriers preventing you from taking care of your own finances?  No    Are there any barriers preventing  you from managing your medications?  No    Are there any barriers preventing you from showering, bathing or dressing yourself? No    Are there any barriers preventing you from doing housework or laundry? No  Are there any barriers preventing you from using the toilet?No  Are you currently engaging in any exercise or physical activity?  Yes.  Water aerobics. Walking. Yoga.    Self-Assessment of Health  What is your perception of your health? Good    Do you sleep more than six hours a night? Yes    In the past 7 days, how much did pain keep you from doing your normal work? Some    Do you spend quality time with family or friends (virtually or in person)? Yes    Do you usually eat a heart healthy diet that constists of a variety of fruits, vegetables, whole grains and fiber? Yes    Do you eat foods high in fat and/or Fast Food more than three times per week? No    How concerned are you that your medical conditions are not being well managed? Not at all    Are you worried that in the next 2 months, you may not have stable housing that you own, rent, or stay in as part of a household? Yes   She owns her home.     Advance Care Planning  Do you have an Advance Directive, Living Will, Durable Power of , or POLST? No            Has advanced directive packet at home.      Health Maintenance Summary            Overdue - COVID-19 Vaccine (4 - 2023-24 season) Overdue since 9/1/2024 03/08/2022  Imm Admin: COVID-19 Vaccine, unspecified - HISTORICAL DATA    05/21/2021  Imm Admin: PFIZER PURPLE CAP SARS-COV-2 VACCINATION (12+)    04/27/2021  Imm Admin: PFIZER PURPLE CAP SARS-COV-2 VACCINATION (12+)              Postponed - Zoster (Shingles) Vaccines (2 of 2) Postponed until 1/19/2025 05/20/2024  Imm Admin: Zoster Vaccine Recombinant (RZV) (SHINGRIX)              Postponed - Diabetes: Retinopathy Screening (Yearly) Postponed until 8/19/2025 05/25/2023  AMB EXTERNAL RETINAL SCREENING RESULTS    07/14/2021   RETINAL SCREENING RESULTS    10/06/2016  REFERRAL FOR RETINAL SCREENING EXAM              Postponed - Influenza Vaccine (1) Postponed until 11/1/2025      No completion history exists for this topic.              Diabetes: Monofilament / LE Exam (Yearly) Next due on 12/28/2024 12/28/2023  Diabetic Monofilament Lower Extremity Exam    12/28/2023  SmartData: WORKFLOW - DIABETES - DIABETIC FOOT EXAM PERFORMED    11/30/2022  SmartData: WORKFLOW - DIABETES - DIABETIC FOOT EXAM PERFORMED              Cervical Cancer Screening (Every 3 Years) Next due on 2/17/2025 02/17/2022  Done - Dr. Fine    06/02/2015  Done - Dr. Boogie              A1c Screening (Every 6 Months) Next due on 4/14/2025      10/14/2024  HEMOGLOBIN A1C    08/16/2023  HEMOGLOBIN A1C    03/31/2023  Hemoglobin A1c    11/21/2022  HEMOGLOBIN A1C    08/24/2022  POCT Hemoglobin A1C    Only the first 5 history entries have been loaded, but more history exists.              Mammogram (Yearly) Next due on 10/11/2025      10/11/2024  MA-SCREENING MAMMO BILAT W/TOMOSYNTHESIS W/CAD    09/28/2023  MA-SCREENING MAMMO BILAT W/TOMOSYNTHESIS W/CAD    07/19/2022  MA-SCREENING MAMMO BILAT W/TOMOSYNTHESIS W/CAD    04/05/2021  MA-SCREENING MAMMO BILAT W/TOMOSYNTHESIS W/CAD    03/05/2020  MA-SCREENING MAMMO BILAT W/TOMOSYNTHESIS W/CAD    Only the first 5 history entries have been loaded, but more history exists.              Fasting Lipid Profile (Yearly) Next due on 10/14/2025      10/14/2024  Lipid Profile    11/21/2022  Lipid Profile    11/04/2021  Lipid Profile    04/10/2019  Lipid Profile    12/04/2017  LIPID PROFILE    Only the first 5 history entries have been loaded, but more history exists.              Diabetes: Urine Protein Screening (Yearly) Next due on 10/14/2025      10/14/2024  MICROALBUMIN CREAT RATIO URINE    11/21/2022  MICROALBUMIN CREAT RATIO URINE    04/10/2019  MICROALBUMIN CREAT RATIO URINE              SERUM CREATININE (Yearly) Next due on  10/14/2025      10/14/2024  Comp Metabolic Panel    08/16/2023  Basic Metabolic Panel    03/31/2023  Basic Metabolic Panel    11/21/2022  Comp Metabolic Panel    04/11/2022  Comp Metabolic Panel    Only the first 5 history entries have been loaded, but more history exists.              Annual Wellness Visit (Yearly) Next due on 11/1/2025 11/01/2024  Level of Service: [place] ANNUAL WELLNESS VISIT-INCLUDES PPPS SUBSEQUE*    11/29/2023  Visit Dx: Medicare annual wellness visit, subsequent    02/17/2022  Visit Dx: Medicare annual wellness visit, subsequent              IMM DTaP/Tdap/Td Vaccine (2 - Td or Tdap) Next due on 4/13/2028 04/13/2018  Imm Admin: Tdap Vaccine              Colorectal Cancer Screening (Colonoscopy - Preferred) Next due on 6/15/2032      06/15/2022  REFERRAL TO GI FOR COLONOSCOPY    06/15/2022  REFERRAL TO GI FOR COLONOSCOPY    12/07/2018  REFERRAL TO GI FOR COLONOSCOPY    07/17/2015  AMB REFERRAL TO GI FOR COLONOSCOPY              Pneumococcal Vaccine: 0-64 Years (Series Information) Completed      11/29/2023  Imm Admin: Pneumococcal Conjugate Vaccine (PCV20)              Hepatitis A Vaccine (Hep A) (Series Information) Aged Out      No completion history exists for this topic.              HPV Vaccines (Series Information) Aged Out      No completion history exists for this topic.              Polio Vaccine (Inactivated Polio) (Series Information) Aged Out      No completion history exists for this topic.              Meningococcal Immunization (Series Information) Aged Out      No completion history exists for this topic.              Discontinued - HIV Screening  Discontinued      No completion history exists for this topic.              Discontinued - Hepatitis B Vaccine (Hep B)  Discontinued      No completion history exists for this topic.              Discontinued - Hepatitis C Screening  Discontinued      No completion history exists for this topic.                    Patient Care  "Team:  CLAUDIO Kovacs as PCP - General (Family Medicine)  Eye [facility]ic as Consulting Physician (Ophthalmology)  Roberto Fine D.O. (Obstetrics & Gynecology)  Daylin Childs C.N.A. as [facility]       Financial Resource Strain: Low Risk  (11/1/2024)    Overall Financial Resource Strain (CARDIA)     Difficulty of Paying Living Expenses: Not hard at all      Transportation Needs: No Transportation Needs (11/1/2024)    PRAPARE - Transportation     Lack of Transportation (Medical): No     Lack of Transportation (Non-Medical): No      Food Insecurity: No Food Insecurity (11/1/2024)    Hunger Vital Sign     Worried About Running Out of Food in the Last Year: Never true     Ran Out of Food in the Last Year: Never true        Encounter Vitals  Blood Pressure: 120/72  Weight: 105 kg (231 lb)  Height: 172.7 cm (5' 8\")  BMI (Calculated): 35.12  Pain Score: No pain     Physical Exam:  Constitutional: NAD  Cardiovascular: RRR, No m/r/g  Lungs: CTAB, no w/r/r  Neurologic: Alert & oriented x3, CN II-XII grossly intact    Assessment and Plan. The following treatment and monitoring plan is recommended:  Chronic low back pain with sciatica  Chronic, decompensated. She saw her pcp in August who recommended PT, stretching, and proper body mechanics. Discussed physical activity. She needs to make herself an appointment for PT. Follow up with PCP.     MDD (major depressive disorder), recurrent, in full remission (CMS-HCC)  Chronic, stable. PHQ-9 in office today is 0. Currently taking cymbalta 60 mg daily. States she feels this has well controlled her mood. She has seen psychiatry in the past. States she is currently going through some difficult things in life and would be interested in returning to talk therapy, however she declines referral. Would like to find her own therapist which is understandable. Follow up with PCP as needed.      Mixed hyperlipidemia  Chronic, stable. Last lipid panel in Oct " 2024 below. She is not currently on any medications for this.  We discussed her dietary/lifestyle regimen. Follow up with PCP for continued monitoring and management.  Lab Results   Component Value Date/Time    CHOLSTRLTOT 192 10/14/2024 09:28 AM    TRIGLYCERIDE 112 10/14/2024 09:28 AM    HDL 43 10/14/2024 09:28 AM     (H) 10/14/2024 09:28 AM         Obesity, Class II, BMI 35-39.9  Chronic, stable. Weight in office today is 231 lbs. BMI 35.12 kg/m2. We discussed her current diet/exercise regimen. Encouraged to remain physically active as well as monitor intake of excess calories. Follow up with PCP for continued monitoring.      Prediabetes  Chronic, stable. We discussed her dietary/lifestyle regimen. Follow up with PCP for continued monitoring.  Lab Results   Component Value Date/Time    HBA1C 6.0 (H) 10/14/2024 0928    AVGLUC 126 10/14/2024 0928         Services suggested: No services needed at this time  Health Care Screening: Age-appropriate preventive services recommended by USPTF and ACIP covered by Medicare were discussed today. Services ordered if indicated and agreed upon by the patient.  Referrals offered: Community-based lifestyle interventions to reduce health risks and promote self-management and wellness, fall prevention, nutrition, physical activity, tobacco-use cessation, weight loss, and mental health services as per orders if indicated.    Discussion today about general wellness and lifestyle habits:    Prevent falls and reduce trip hazards; Cautioned about securing or removing rugs.  Have a working fire alarm and carbon monoxide detector.  Engage in regular physical activity and social activities.    Follow-up: No follow-ups on file.

## 2024-11-01 NOTE — ASSESSMENT & PLAN NOTE
Chronic, stable. Last lipid panel in Oct 2024 below. She is not currently on any medications for this.  We discussed her dietary/lifestyle regimen. Follow up with PCP for continued monitoring and management.  Lab Results   Component Value Date/Time    CHOLSTRLTOT 192 10/14/2024 09:28 AM    TRIGLYCERIDE 112 10/14/2024 09:28 AM    HDL 43 10/14/2024 09:28 AM     (H) 10/14/2024 09:28 AM

## 2024-11-01 NOTE — ASSESSMENT & PLAN NOTE
Chronic, stable. We discussed her dietary/lifestyle regimen. Follow up with PCP for continued monitoring.  Lab Results   Component Value Date/Time    HBA1C 6.0 (H) 10/14/2024 0928    AVGLUC 126 10/14/2024 0928

## 2024-12-09 SDOH — ECONOMIC STABILITY: INCOME INSECURITY: IN THE LAST 12 MONTHS, WAS THERE A TIME WHEN YOU WERE NOT ABLE TO PAY THE MORTGAGE OR RENT ON TIME?: NO

## 2024-12-09 SDOH — ECONOMIC STABILITY: FOOD INSECURITY: WITHIN THE PAST 12 MONTHS, THE FOOD YOU BOUGHT JUST DIDN'T LAST AND YOU DIDN'T HAVE MONEY TO GET MORE.: SOMETIMES TRUE

## 2024-12-09 SDOH — ECONOMIC STABILITY: FOOD INSECURITY: WITHIN THE PAST 12 MONTHS, YOU WORRIED THAT YOUR FOOD WOULD RUN OUT BEFORE YOU GOT MONEY TO BUY MORE.: SOMETIMES TRUE

## 2024-12-09 SDOH — HEALTH STABILITY: PHYSICAL HEALTH: ON AVERAGE, HOW MANY DAYS PER WEEK DO YOU ENGAGE IN MODERATE TO STRENUOUS EXERCISE (LIKE A BRISK WALK)?: 3 DAYS

## 2024-12-09 SDOH — ECONOMIC STABILITY: INCOME INSECURITY: HOW HARD IS IT FOR YOU TO PAY FOR THE VERY BASICS LIKE FOOD, HOUSING, MEDICAL CARE, AND HEATING?: SOMEWHAT HARD

## 2024-12-09 SDOH — HEALTH STABILITY: PHYSICAL HEALTH: ON AVERAGE, HOW MANY MINUTES DO YOU ENGAGE IN EXERCISE AT THIS LEVEL?: 30 MIN

## 2024-12-09 ASSESSMENT — SOCIAL DETERMINANTS OF HEALTH (SDOH)
HOW OFTEN DO YOU GET TOGETHER WITH FRIENDS OR RELATIVES?: ONCE A WEEK
HOW MANY DRINKS CONTAINING ALCOHOL DO YOU HAVE ON A TYPICAL DAY WHEN YOU ARE DRINKING: 1 OR 2
HOW OFTEN DO YOU HAVE A DRINK CONTAINING ALCOHOL: MONTHLY OR LESS
DO YOU BELONG TO ANY CLUBS OR ORGANIZATIONS SUCH AS CHURCH GROUPS UNIONS, FRATERNAL OR ATHLETIC GROUPS, OR SCHOOL GROUPS?: YES
HOW OFTEN DO YOU ATTENT MEETINGS OF THE CLUB OR ORGANIZATION YOU BELONG TO?: 1 TO 4 TIMES PER YEAR
HOW OFTEN DO YOU ATTENT MEETINGS OF THE CLUB OR ORGANIZATION YOU BELONG TO?: 1 TO 4 TIMES PER YEAR
WITHIN THE PAST 12 MONTHS, YOU WORRIED THAT YOUR FOOD WOULD RUN OUT BEFORE YOU GOT THE MONEY TO BUY MORE: SOMETIMES TRUE
HOW OFTEN DO YOU ATTEND CHURCH OR RELIGIOUS SERVICES?: 1 TO 4 TIMES PER YEAR
DO YOU BELONG TO ANY CLUBS OR ORGANIZATIONS SUCH AS CHURCH GROUPS UNIONS, FRATERNAL OR ATHLETIC GROUPS, OR SCHOOL GROUPS?: YES
IN THE PAST 12 MONTHS, HAS THE ELECTRIC, GAS, OIL, OR WATER COMPANY THREATENED TO SHUT OFF SERVICE IN YOUR HOME?: NO
HOW OFTEN DO YOU ATTEND CHURCH OR RELIGIOUS SERVICES?: 1 TO 4 TIMES PER YEAR
IN A TYPICAL WEEK, HOW MANY TIMES DO YOU TALK ON THE PHONE WITH FAMILY, FRIENDS, OR NEIGHBORS?: ONCE A WEEK
HOW OFTEN DO YOU GET TOGETHER WITH FRIENDS OR RELATIVES?: ONCE A WEEK
HOW OFTEN DO YOU HAVE SIX OR MORE DRINKS ON ONE OCCASION: NEVER
HOW HARD IS IT FOR YOU TO PAY FOR THE VERY BASICS LIKE FOOD, HOUSING, MEDICAL CARE, AND HEATING?: SOMEWHAT HARD
IN A TYPICAL WEEK, HOW MANY TIMES DO YOU TALK ON THE PHONE WITH FAMILY, FRIENDS, OR NEIGHBORS?: ONCE A WEEK

## 2024-12-09 ASSESSMENT — LIFESTYLE VARIABLES
HOW MANY STANDARD DRINKS CONTAINING ALCOHOL DO YOU HAVE ON A TYPICAL DAY: 1 OR 2
HOW OFTEN DO YOU HAVE A DRINK CONTAINING ALCOHOL: MONTHLY OR LESS
AUDIT-C TOTAL SCORE: 1
HOW OFTEN DO YOU HAVE SIX OR MORE DRINKS ON ONE OCCASION: NEVER
SKIP TO QUESTIONS 9-10: 1

## 2024-12-10 ENCOUNTER — OFFICE VISIT (OUTPATIENT)
Dept: MEDICAL GROUP | Facility: LAB | Age: 64
End: 2024-12-10
Payer: MEDICARE

## 2024-12-10 VITALS
WEIGHT: 229 LBS | TEMPERATURE: 96.7 F | HEIGHT: 68 IN | SYSTOLIC BLOOD PRESSURE: 120 MMHG | OXYGEN SATURATION: 97 % | BODY MASS INDEX: 34.71 KG/M2 | RESPIRATION RATE: 16 BRPM | HEART RATE: 86 BPM | DIASTOLIC BLOOD PRESSURE: 72 MMHG

## 2024-12-10 DIAGNOSIS — R73.03 PREDIABETES: ICD-10-CM

## 2024-12-10 DIAGNOSIS — M54.42 CHRONIC BILATERAL LOW BACK PAIN WITH BILATERAL SCIATICA: ICD-10-CM

## 2024-12-10 DIAGNOSIS — G89.29 CHRONIC BILATERAL LOW BACK PAIN WITH BILATERAL SCIATICA: ICD-10-CM

## 2024-12-10 DIAGNOSIS — M54.41 CHRONIC BILATERAL LOW BACK PAIN WITH BILATERAL SCIATICA: ICD-10-CM

## 2024-12-10 DIAGNOSIS — Z90.49 STATUS POST CHOLECYSTECTOMY: ICD-10-CM

## 2024-12-10 DIAGNOSIS — Z00.00 WELL ADULT EXAM: ICD-10-CM

## 2024-12-10 DIAGNOSIS — Z78.0 ASYMPTOMATIC MENOPAUSE: ICD-10-CM

## 2024-12-10 DIAGNOSIS — E78.2 MIXED HYPERLIPIDEMIA: ICD-10-CM

## 2024-12-10 DIAGNOSIS — F33.42 MDD (MAJOR DEPRESSIVE DISORDER), RECURRENT, IN FULL REMISSION (HCC): ICD-10-CM

## 2024-12-10 DIAGNOSIS — E04.1 THYROID NODULE: ICD-10-CM

## 2024-12-10 PROBLEM — E66.812 OBESITY, CLASS II, BMI 35-39.9: Status: RESOLVED | Noted: 2022-08-24 | Resolved: 2024-12-10

## 2024-12-10 PROCEDURE — 3074F SYST BP LT 130 MM HG: CPT | Performed by: NURSE PRACTITIONER

## 2024-12-10 PROCEDURE — 3078F DIAST BP <80 MM HG: CPT | Performed by: NURSE PRACTITIONER

## 2024-12-10 PROCEDURE — 99396 PREV VISIT EST AGE 40-64: CPT | Performed by: NURSE PRACTITIONER

## 2024-12-10 ASSESSMENT — FIBROSIS 4 INDEX: FIB4 SCORE: 1.07

## 2024-12-10 NOTE — PROGRESS NOTES
Verbal consent was acquired by the patient to use [employer] ambient listening note generation during this visit Yes      Mouna Denson is a 64 y.o. female who presents for annual exam.    History of Present Illness  The patient is a 64-year-old established female here for an annual exam. She does have [facility] and has already had her comprehensive health assessment in the past 12 months/2024. Her healthcare maintenance is up to date in regards to Pap smear, mammogram, and colonoscopy.      She has been engaging in water aerobics at [employer] as a substitute for physical therapy, reporting a positive impact on her overall well-being. She also utilizes the steam room and maintains an active lifestyle, including walking her dog for approximately 30 minutes daily. She has expressed interest in undergoing a bone density test, which she has not previously had. She reports regular bowel movements, typically twice daily, and no bladder issues. She experiences occasional urinary leakage during sneezing or coughing. She reports no chest pain, respiratory difficulties, nausea, vomiting, or heartburn. She has consulted a dermatologist once for mole examination. She has a history of fibroid removal and D & C, performed by Dr. Roberto Rubio, with whom she has a scheduled appointment in 10/2025. She experiences intermittent back pain radiating down her legs, which alternates sides. She also reports occasional ankle pain flare-ups. She is currently unemployed and has not undergone any surgeries in 2024. She reports no dysphagia but mentions mild swelling and occasional insomnia, which she manages with magnesium supplements.    She has been supplementing her diet with vitamin D due to a previously identified deficiency.    She continues to take duloxetine and reports stable mood. She is currently  from her  and is considering counseling. She does not see a psychiatrist.  "She has joined a support group and a Sabianist.    SOCIAL HISTORY  The patient has not smoked since  and drinks alcohol very rarely, only socially.    FAMILY HISTORY  The patient's mother  from lung cancer in  and was a smoker, but not a heavy one. The patient's father had cancer, survived it, and lived to be 88 years old. He also had dementia, Parkinson's, and Alzheimer's. The patient's brother is a heavy drinker. The patient's granddaughter has a chromosome deletion.    MEDICATIONS  Current: duloxetine, vitamin D supplement    Review of Systems  Negative except for HPI  Objective   /72 (BP Location: Left arm, Patient Position: Sitting, BP Cuff Size: Large adult)   Pulse 86   Temp 35.9 °C (96.7 °F)   Resp 16   Ht 1.727 m (5' 8\")   Wt 104 kg (229 lb)   SpO2 97%   Physical Exam  Gen: NAD  Resp: nonlabored.  Able to speak in full sentences.  CTA bilaterally.   CV RRR  Abd: soft / nontender.    Neck: No thyromegaly.  No significantly enlarged cervical lymphadenopathy.  Psy: pleasant / cooperative.   Neuro:  Alert and oriented x 3        Assessment & Plan  1. Annual examination.  Her overall health status is commendable, with the exception of prediabetes and vitamin D deficiency. She has experienced a weight loss of approximately 15 pounds. She is encouraged to incorporate weightlifting into her exercise regimen to enhance bone health and manage prediabetes. A bone density test will be ordered. If the results indicate normal bone health, a repeat test will not be necessary for another 5 years. However, if signs of osteopenia or osteoporosis are detected, a follow-up test will be scheduled in 2 years.  Colon cancer screening is up-to-date.  She is up-to-date with her gynecologist.  Encouraged her to schedule yearly dermatology consults.    2. Prediabetes.  Her prediabetes is being managed through lifestyle modifications, including water aerobics and a healthy diet. She is advised to continue these " activities and consider adding weightlifting to further improve her condition. A re-evaluation of her prediabetes will be conducted in 6 months, with an A1c test to be performed in the office.  If her A1c is climbing instead of improving, will consider adding on metformin and possibly GLP-1 agonist therapy particularly if she falls into a diabetic state.  Encouraged her to avoid white carbohydrates.    3. Vitamin D deficiency.  Her vitamin D level was significantly low at 17, and she is currently taking a supplement. She is advised to continue her current vitamin D supplementation.    4.  Major depressive disorder in remission:  Stable.  She is currently taking duloxetine and reports that her moods have been stable. She is advised to continue her current medication regimen.    5.  Chronic low back pain: Improved with water aerobics.    Follow-up  The patient is scheduled for a follow-up visit on 04/07/2025 for an A1c here in the office..                 Please note that this dictation was created using voice recognition software. I have made every reasonable attempt to correct obvious errors, but I expect that there are errors of grammar and possibly content that I did not discover before finalizing the note.

## 2025-02-25 ENCOUNTER — PATIENT MESSAGE (OUTPATIENT)
Dept: MEDICAL GROUP | Facility: LAB | Age: 65
End: 2025-02-25
Payer: MEDICARE

## 2025-03-03 RX ORDER — DULOXETIN HYDROCHLORIDE 60 MG/1
60 CAPSULE, DELAYED RELEASE ORAL
Qty: 90 CAPSULE | Refills: 1 | Status: SHIPPED | OUTPATIENT
Start: 2025-03-03

## 2025-04-07 ENCOUNTER — OFFICE VISIT (OUTPATIENT)
Dept: MEDICAL GROUP | Facility: LAB | Age: 65
End: 2025-04-07
Payer: MEDICARE

## 2025-04-07 VITALS
WEIGHT: 241 LBS | DIASTOLIC BLOOD PRESSURE: 72 MMHG | BODY MASS INDEX: 36.53 KG/M2 | OXYGEN SATURATION: 96 % | HEART RATE: 66 BPM | SYSTOLIC BLOOD PRESSURE: 128 MMHG | HEIGHT: 68 IN | TEMPERATURE: 97.8 F | RESPIRATION RATE: 16 BRPM

## 2025-04-07 DIAGNOSIS — E78.2 MIXED HYPERLIPIDEMIA: ICD-10-CM

## 2025-04-07 DIAGNOSIS — E04.1 THYROID NODULE: ICD-10-CM

## 2025-04-07 DIAGNOSIS — E66.01 SEVERE OBESITY (HCC): ICD-10-CM

## 2025-04-07 DIAGNOSIS — E11.9 TYPE 2 DIABETES MELLITUS WITHOUT COMPLICATION, WITHOUT LONG-TERM CURRENT USE OF INSULIN (HCC): ICD-10-CM

## 2025-04-07 DIAGNOSIS — E55.9 VITAMIN D DEFICIENCY: ICD-10-CM

## 2025-04-07 LAB
HBA1C MFR BLD: 6.2 % (ref ?–5.8)
POCT INT CON NEG: NEGATIVE
POCT INT CON POS: POSITIVE

## 2025-04-07 PROCEDURE — 3078F DIAST BP <80 MM HG: CPT | Performed by: NURSE PRACTITIONER

## 2025-04-07 PROCEDURE — 83036 HEMOGLOBIN GLYCOSYLATED A1C: CPT | Performed by: NURSE PRACTITIONER

## 2025-04-07 PROCEDURE — 99214 OFFICE O/P EST MOD 30 MIN: CPT | Performed by: NURSE PRACTITIONER

## 2025-04-07 PROCEDURE — 3074F SYST BP LT 130 MM HG: CPT | Performed by: NURSE PRACTITIONER

## 2025-04-07 ASSESSMENT — FIBROSIS 4 INDEX: FIB4 SCORE: 1.09

## 2025-04-07 ASSESSMENT — PATIENT HEALTH QUESTIONNAIRE - PHQ9
5. POOR APPETITE OR OVEREATING: NOT AT ALL
SUM OF ALL RESPONSES TO PHQ QUESTIONS 1-9: 0
SUM OF ALL RESPONSES TO PHQ9 QUESTIONS 1 AND 2: 0
2. FEELING DOWN, DEPRESSED, IRRITABLE, OR HOPELESS: NOT AT ALL
6. FEELING BAD ABOUT YOURSELF - OR THAT YOU ARE A FAILURE OR HAVE LET YOURSELF OR YOUR FAMILY DOWN: NOT AL ALL
9. THOUGHTS THAT YOU WOULD BE BETTER OFF DEAD, OR OF HURTING YOURSELF: NOT AT ALL
8. MOVING OR SPEAKING SO SLOWLY THAT OTHER PEOPLE COULD HAVE NOTICED. OR THE OPPOSITE, BEING SO FIGETY OR RESTLESS THAT YOU HAVE BEEN MOVING AROUND A LOT MORE THAN USUAL: NOT AT ALL
7. TROUBLE CONCENTRATING ON THINGS, SUCH AS READING THE NEWSPAPER OR WATCHING TELEVISION: NOT AT ALL
3. TROUBLE FALLING OR STAYING ASLEEP OR SLEEPING TOO MUCH: NOT AT ALL
1. LITTLE INTEREST OR PLEASURE IN DOING THINGS: NOT AT ALL
4. FEELING TIRED OR HAVING LITTLE ENERGY: NOT AT ALL

## 2025-04-07 NOTE — PROGRESS NOTES
"Verbal consent was acquired by the patient to use [employer] ambient listening note generation during this visit Yes      Subjective   Amy Denson is a 65 y.o. female who presents for f/u  History of Present Illness  The patient is a 65-year-old established female here to follow up on type 2 diabetes. Her last A1c was 6.0 in the fall of 2024 and as high as 7.0 in 2022. She has struggled slightly x the past few months with sweets and gained 10 lbs.     She reports overall good health, with no numbness, tingling, or burning pain in her feet. She is not experiencing any dysuria or polyuria. However, she has been experiencing increased thirst recently. She maintains an active lifestyle, including regular gym attendance and participation in water fitness classes. Her sleep pattern is normal, and she primarily consumes water throughout the day. Her breakfast typically consists of oatmeal and fruit, which she tolerates well.     She was found to have low vitamin D levels on her last lab panel and is currently taking supplements.    Hyperlipidemia:  due for labs in the fall.  Exercising regularly.  Denies CP.  Last lipid panel LDL down to 127.     FAMILY HISTORY  Her brother was diagnosed with multiple sclerosis about a year ago.    MEDICATIONS  Current: vitamin D    Review of Systems  Neg except hpi  Objective   /72 (BP Location: Left arm, Patient Position: Sitting, BP Cuff Size: Large adult)   Pulse 66   Temp 36.6 °C (97.8 °F)   Resp 16   Ht 1.727 m (5' 8\")   Wt 109 kg (241 lb)   SpO2 96%   Physical Exam  Lungs were auscultated. CTA bilaterally   Heart sounds are normal. RRR  Monofilament exam was performed. Monofilament testing with a 10 gram force: sensation intact: intact bilaterally  Visual Inspection: Feet without maceration, ulcers, fissures.  Pedal pulses: intact bilaterally      Results  Laboratory Studies  A1c is 6.2.       Assessment & Plan  1. Type 2 Diabetes Mellitus  Her A1c level is " currently at 6.2, indicating good glycemic control. She reports no numbness, tingling, or burning pain in her feet, and no pain during urination or excessive urination. She has been experiencing increased thirst lately, which may be related to salt intake. She has gained approximately 10 pounds since the fall of 2024. A monofilament exam was performed, and her nerves are functioning well.  Treatment plan: She is advised to continue her diabetic diet and exercise regimen, including water fitness classes a couple of days a week. She is also advised to reduce her intake of sweet foods, particularly cinnamon rolls. A lab order for her annual labs has been provided, which she should complete prior to her next appointment.    2. Vitamin D Deficiency  She reports being low on vitamin D during her last full lab panel.  Treatment plan: She is currently taking vitamin D supplements.  Recheck level in fall 2025.     3.  Obesity: as in #1.  Discussed portion control, high fiber diet, limiting white carbs and continuing her exercise program.      Follow-up: The patient will follow up on 10/13/2025.       HCC Gap Form    Diagnosis: E66.01 - Severe obesity (HCC)  Z68.36 - Body mass index (BMI) of 36.0-36.9 in adult  Comorbidity Diagnosis: Mixed hyperlipidemia  The current BMI is 36.64 kg/m² as of 04/07/25.    Past BMI Values:  04/07/25 : 36.64 kg/m². 12/10/24 : 34.82 kg/m². 11/01/24 : 35.12 kg/m².   Assessment and plan: Chronic, worsening. Encouraged healthy diet and physical activity changes with a goal of weight loss. Follow up at least annually. The comorbid condition is worsening  based on today's assessment. Treatment plan as follows: eliminate white carbs / sugars / sweets. Follow up in 6 months.  Last edited 04/07/25 11:32 PDT by Emperatriz Kay, [email or web address].                Please note that this dictation was created using voice recognition software. I have made every reasonable attempt to correct obvious errors, but I expect  that there are errors of grammar and possibly content that I did not discover before finalizing the note.

## 2025-08-12 ENCOUNTER — HOSPITAL ENCOUNTER (OUTPATIENT)
Dept: LAB | Facility: MEDICAL CENTER | Age: 65
End: 2025-08-12
Attending: NURSE PRACTITIONER
Payer: MEDICARE

## 2025-08-12 DIAGNOSIS — E78.2 MIXED HYPERLIPIDEMIA: ICD-10-CM

## 2025-08-12 DIAGNOSIS — E11.9 TYPE 2 DIABETES MELLITUS WITHOUT COMPLICATION, WITHOUT LONG-TERM CURRENT USE OF INSULIN (HCC): ICD-10-CM

## 2025-08-12 DIAGNOSIS — E04.1 THYROID NODULE: ICD-10-CM

## 2025-08-12 DIAGNOSIS — E55.9 VITAMIN D DEFICIENCY: ICD-10-CM

## 2025-08-12 LAB
25(OH)D3 SERPL-MCNC: 32 NG/ML (ref 30–100)
ALBUMIN SERPL BCP-MCNC: 4.5 G/DL (ref 3.2–4.9)
ALBUMIN/GLOB SERPL: 1.4 G/DL
ALP SERPL-CCNC: 68 U/L (ref 30–99)
ALT SERPL-CCNC: 38 U/L (ref 2–50)
ANION GAP SERPL CALC-SCNC: 13 MMOL/L (ref 7–16)
AST SERPL-CCNC: 44 U/L (ref 12–45)
BASOPHILS # BLD AUTO: 1.1 % (ref 0–1.8)
BASOPHILS # BLD: 0.07 K/UL (ref 0–0.12)
BILIRUB SERPL-MCNC: 0.7 MG/DL (ref 0.1–1.5)
BUN SERPL-MCNC: 13 MG/DL (ref 8–22)
CALCIUM ALBUM COR SERPL-MCNC: 9.2 MG/DL (ref 8.5–10.5)
CALCIUM SERPL-MCNC: 9.6 MG/DL (ref 8.5–10.5)
CHLORIDE SERPL-SCNC: 106 MMOL/L (ref 96–112)
CHOLEST SERPL-MCNC: 157 MG/DL (ref 100–199)
CO2 SERPL-SCNC: 22 MMOL/L (ref 20–33)
CREAT SERPL-MCNC: 0.86 MG/DL (ref 0.5–1.4)
EOSINOPHIL # BLD AUTO: 0.37 K/UL (ref 0–0.51)
EOSINOPHIL NFR BLD: 6 % (ref 0–6.9)
ERYTHROCYTE [DISTWIDTH] IN BLOOD BY AUTOMATED COUNT: 46 FL (ref 35.9–50)
EST. AVERAGE GLUCOSE BLD GHB EST-MCNC: 143 MG/DL
FASTING STATUS PATIENT QL REPORTED: NORMAL
GFR SERPLBLD CREATININE-BSD FMLA CKD-EPI: 76 ML/MIN/1.73 M 2
GLOBULIN SER CALC-MCNC: 3.3 G/DL (ref 1.9–3.5)
GLUCOSE SERPL-MCNC: 104 MG/DL (ref 65–99)
HBA1C MFR BLD: 6.6 % (ref 4–5.6)
HCT VFR BLD AUTO: 49.9 % (ref 37–47)
HDLC SERPL-MCNC: 40 MG/DL
HGB BLD-MCNC: 15.8 G/DL (ref 12–16)
IMM GRANULOCYTES # BLD AUTO: 0.01 K/UL (ref 0–0.11)
IMM GRANULOCYTES NFR BLD AUTO: 0.2 % (ref 0–0.9)
LDLC SERPL CALC-MCNC: 101 MG/DL
LYMPHOCYTES # BLD AUTO: 2.16 K/UL (ref 1–4.8)
LYMPHOCYTES NFR BLD: 34.8 % (ref 22–41)
MCH RBC QN AUTO: 30.2 PG (ref 27–33)
MCHC RBC AUTO-ENTMCNC: 31.7 G/DL (ref 32.2–35.5)
MCV RBC AUTO: 95.2 FL (ref 81.4–97.8)
MONOCYTES # BLD AUTO: 0.55 K/UL (ref 0–0.85)
MONOCYTES NFR BLD AUTO: 8.9 % (ref 0–13.4)
NEUTROPHILS # BLD AUTO: 3.05 K/UL (ref 1.82–7.42)
NEUTROPHILS NFR BLD: 49 % (ref 44–72)
NRBC # BLD AUTO: 0 K/UL
NRBC BLD-RTO: 0 /100 WBC (ref 0–0.2)
PLATELET # BLD AUTO: 259 K/UL (ref 164–446)
PMV BLD AUTO: 9.4 FL (ref 9–12.9)
POTASSIUM SERPL-SCNC: 4.3 MMOL/L (ref 3.6–5.5)
PROT SERPL-MCNC: 7.8 G/DL (ref 6–8.2)
RBC # BLD AUTO: 5.24 M/UL (ref 4.2–5.4)
SODIUM SERPL-SCNC: 141 MMOL/L (ref 135–145)
T4 FREE SERPL-MCNC: 1.42 NG/DL (ref 0.93–1.7)
TRIGL SERPL-MCNC: 79 MG/DL (ref 0–149)
TSH SERPL-ACNC: 1.42 UIU/ML (ref 0.38–5.33)
WBC # BLD AUTO: 6.2 K/UL (ref 4.8–10.8)

## 2025-08-12 PROCEDURE — 80061 LIPID PANEL: CPT

## 2025-08-12 PROCEDURE — 84443 ASSAY THYROID STIM HORMONE: CPT

## 2025-08-12 PROCEDURE — 82306 VITAMIN D 25 HYDROXY: CPT

## 2025-08-12 PROCEDURE — 84439 ASSAY OF FREE THYROXINE: CPT

## 2025-08-12 PROCEDURE — 85025 COMPLETE CBC W/AUTO DIFF WBC: CPT

## 2025-08-12 PROCEDURE — 36415 COLL VENOUS BLD VENIPUNCTURE: CPT

## 2025-08-12 PROCEDURE — 83036 HEMOGLOBIN GLYCOSYLATED A1C: CPT

## 2025-08-12 PROCEDURE — 80053 COMPREHEN METABOLIC PANEL: CPT

## 2025-08-14 ENCOUNTER — HOSPITAL ENCOUNTER (OUTPATIENT)
Dept: LAB | Facility: MEDICAL CENTER | Age: 65
End: 2025-08-14
Attending: NURSE PRACTITIONER
Payer: MEDICARE

## 2025-08-14 ENCOUNTER — OFFICE VISIT (OUTPATIENT)
Dept: MEDICAL GROUP | Facility: LAB | Age: 65
End: 2025-08-14
Payer: MEDICARE

## 2025-08-14 ENCOUNTER — HOSPITAL ENCOUNTER (OUTPATIENT)
Facility: MEDICAL CENTER | Age: 65
End: 2025-08-14
Attending: NURSE PRACTITIONER
Payer: MEDICARE

## 2025-08-14 VITALS
TEMPERATURE: 96.2 F | WEIGHT: 238 LBS | HEIGHT: 68 IN | DIASTOLIC BLOOD PRESSURE: 80 MMHG | RESPIRATION RATE: 16 BRPM | SYSTOLIC BLOOD PRESSURE: 160 MMHG | OXYGEN SATURATION: 97 % | BODY MASS INDEX: 36.07 KG/M2 | HEART RATE: 80 BPM

## 2025-08-14 DIAGNOSIS — Z11.3 SCREEN FOR STD (SEXUALLY TRANSMITTED DISEASE): ICD-10-CM

## 2025-08-14 DIAGNOSIS — F33.1 MODERATE EPISODE OF RECURRENT MAJOR DEPRESSIVE DISORDER (HCC): ICD-10-CM

## 2025-08-14 DIAGNOSIS — E11.9 TYPE 2 DIABETES MELLITUS WITHOUT COMPLICATION, WITHOUT LONG-TERM CURRENT USE OF INSULIN (HCC): ICD-10-CM

## 2025-08-14 DIAGNOSIS — F41.1 GAD (GENERALIZED ANXIETY DISORDER): ICD-10-CM

## 2025-08-14 DIAGNOSIS — R41.840 ATTENTION DEFICIT: ICD-10-CM

## 2025-08-14 PROBLEM — F33.42 MDD (MAJOR DEPRESSIVE DISORDER), RECURRENT, IN FULL REMISSION (HCC): Status: RESOLVED | Noted: 2017-01-05 | Resolved: 2025-08-14

## 2025-08-14 LAB
HCV AB SER QL: NORMAL
HIV 1+2 AB+HIV1 P24 AG SERPL QL IA: NORMAL
T PALLIDUM AB SER QL IA: NORMAL

## 2025-08-14 PROCEDURE — 87491 CHLMYD TRACH DNA AMP PROBE: CPT

## 2025-08-14 PROCEDURE — 87389 HIV-1 AG W/HIV-1&-2 AB AG IA: CPT

## 2025-08-14 PROCEDURE — 3079F DIAST BP 80-89 MM HG: CPT | Performed by: NURSE PRACTITIONER

## 2025-08-14 PROCEDURE — 36415 COLL VENOUS BLD VENIPUNCTURE: CPT

## 2025-08-14 PROCEDURE — 99214 OFFICE O/P EST MOD 30 MIN: CPT | Performed by: NURSE PRACTITIONER

## 2025-08-14 PROCEDURE — 86803 HEPATITIS C AB TEST: CPT

## 2025-08-14 PROCEDURE — 3077F SYST BP >= 140 MM HG: CPT | Performed by: NURSE PRACTITIONER

## 2025-08-14 PROCEDURE — 87591 N.GONORRHOEAE DNA AMP PROB: CPT

## 2025-08-14 PROCEDURE — 86780 TREPONEMA PALLIDUM: CPT

## 2025-08-14 ASSESSMENT — FIBROSIS 4 INDEX: FIB4 SCORE: 1.74

## 2025-08-15 DIAGNOSIS — Z11.3 SCREEN FOR STD (SEXUALLY TRANSMITTED DISEASE): ICD-10-CM

## 2025-08-15 LAB
C TRACH DNA GENITAL QL NAA+PROBE: NEGATIVE
N GONORRHOEA DNA GENITAL QL NAA+PROBE: NEGATIVE
SPECIMEN SOURCE: NORMAL

## 2025-08-28 ENCOUNTER — OFFICE VISIT (OUTPATIENT)
Dept: BEHAVIORAL HEALTH | Facility: CLINIC | Age: 65
End: 2025-08-28
Payer: MEDICARE

## 2025-08-28 DIAGNOSIS — F32.A DEPRESSIVE DISORDER: Primary | ICD-10-CM

## 2025-08-28 DIAGNOSIS — F88 SENSORY PROCESSING DIFFICULTY: ICD-10-CM

## (undated) DEVICE — SUTURE 0 VICRYL PLUS UR-6 - 27 INCH (36/BX)

## (undated) DEVICE — SET LEADWIRE 5 LEAD BEDSIDE DISPOSABLE ECG (1SET OF 5/EA)

## (undated) DEVICE — KIT ANESTHESIA W/CIRCUIT & 3/LT BAG W/FILTER (20EA/CA)

## (undated) DEVICE — TUBE CONNECTING SUCTION - CLEAR PLASTIC STERILE 72 IN (50EA/CA)

## (undated) DEVICE — SUCTION INSTRUMENT YANKAUER BULBOUS TIP W/O VENT (50EA/CA)

## (undated) DEVICE — SODIUM CHL. IRRIGATION 0.9% 3000ML (4EA/CA 65CA/PF)

## (undated) DEVICE — CLIP APPLIER 10MM ENDO LARGE (3EA/BX)

## (undated) DEVICE — TUBING CLEARLINK DUO-VENT - C-FLO (48EA/CA)

## (undated) DEVICE — GLOVE BIOGEL PI INDICATOR SZ 7.5 SURGICAL PF LF -(50/BX 4BX/CA)

## (undated) DEVICE — TUBING INFLOW HYSTEROSCOPY (10EA/CA)

## (undated) DEVICE — DRESSING SURGICAL NUKNIT 6X9 (10EA/BX)

## (undated) DEVICE — CANNULA W/SEAL 5X100 Z-THRE - ADED KII (12/BX)

## (undated) DEVICE — SENSOR OXIMETER ADULT SPO2 RD SET (20EA/BX)

## (undated) DEVICE — CATHETER IV 20 GA X 1-1/4 ---SURG.& SDS ONLY--- (50EA/BX)

## (undated) DEVICE — Device

## (undated) DEVICE — PAD SANITARY 11IN MAXI IND WRAPPED  (12EA/PK 24PK/CA)

## (undated) DEVICE — SPECIMEN PLASTIC CONVERTOR - (10/CA)

## (undated) DEVICE — CANISTER SUCTION 3000ML MECHANICAL FILTER AUTO SHUTOFF MEDI-VAC NONSTERILE LF DISP  (40EA/CA)

## (undated) DEVICE — SET SUCTION/IRRIGATION WITH DISPOSABLE TIP (6/CA )PART #0250-070-520 IS A SUB

## (undated) DEVICE — GLOVE SZ 7 BIOGEL PI MICRO - PF LF (50PR/BX 4BX/CA)

## (undated) DEVICE — DEVICE TISSUE REMOVAL MYOSURE

## (undated) DEVICE — SODIUM CHL IRRIGATION 0.9% 1000ML (12EA/CA)

## (undated) DEVICE — TUBING OUTFLOW HYSTEROSCPY (10EA/BX)

## (undated) DEVICE — PACK LAP CHOLE OR - (2EA/CA)

## (undated) DEVICE — KIT  I.V. START (100EA/CA)

## (undated) DEVICE — SENSOR SPO2 NEO LNCS ADHESIVE (20/BX) SEE USER NOTES

## (undated) DEVICE — PROTECTOR ULNA NERVE - (36PR/CA)

## (undated) DEVICE — LACTATED RINGERS INJ 1000 ML - (14EA/CA 60CA/PF)

## (undated) DEVICE — SUTURE GENERAL

## (undated) DEVICE — DRAPESURG STERI-DRAPE LONG - (10/BX 4BX/CA)

## (undated) DEVICE — SET TUBING PNEUMOCLEAR HIGH FLOW SMOKE EVACUATION (10EA/BX)

## (undated) DEVICE — SLEEVE, VASO, THIGH, MED

## (undated) DEVICE — TROCAR 5X100 NON BLADED Z-TH - READ KII (6/BX)

## (undated) DEVICE — BANDAID SHEER STRIP 3/4 IN (100EA/BX 12BX/CA)

## (undated) DEVICE — CLOSURE SKIN STRIP 1/2 X 4 IN - (STERI STRIP) (50/BX 4BX/CA)

## (undated) DEVICE — SET EXTENSION WITH 2 PORTS (48EA/CA) ***PART #2C8610 IS A SUBSTITUTE*****

## (undated) DEVICE — TOWEL STOP TIMEOUT SAFETY FLAG (40EA/CA)

## (undated) DEVICE — CHLORAPREP 26 ML APPLICATOR - ORANGE TINT(25/CA)

## (undated) DEVICE — ELECTRODE 850 FOAM ADHESIVE - HYDROGEL RADIOTRNSPRNT (50/PK)

## (undated) DEVICE — CANISTER SUCTION RIGID RED 1500CC (40EA/CA)

## (undated) DEVICE — SUTURE 4-0 VICRYL PLUSFS-1 - 27 INCH (36/BX)

## (undated) DEVICE — COVER LIGHT HANDLE ALC PLUS DISP (18EA/BX)

## (undated) DEVICE — GOWN WARMING STANDARD FLEX - (30/CA)

## (undated) DEVICE — SYRINGE DISP. 60 CC LL - (30/BX, 12BX/CA)**WHEN THESE ARE GONE ORDER #500206**

## (undated) DEVICE — SCISSORS 5MM CVD (6EA/BX)

## (undated) DEVICE — DRAPE UNDER BUTTOCKS FLUID - (20/CA)

## (undated) DEVICE — WATER IRRIGATION STERILE 1000ML (12EA/CA)

## (undated) DEVICE — HEAD HOLDER JUNIOR/ADULT

## (undated) DEVICE — GLOVE BIOGEL PI INDICATOR SZ 8.0 SURGICAL PF LF -(50/BX 4BX/CA)

## (undated) DEVICE — TRAY SRGPRP PVP IOD WT PRP - (20/CA)

## (undated) DEVICE — TROCAR Z THREAD12MM OPTICAL - NON BLADED (6/BX)

## (undated) DEVICE — MASK ANESTHESIA ADULT  - (100/CA)

## (undated) DEVICE — ELECTRODE DUAL RETURN W/ CORD - (50/PK)

## (undated) DEVICE — BAG RETRIEVAL 10ML (10EA/BX)

## (undated) DEVICE — STERI STRIP COMPOUND BENZOIN - TINCTURE 0.6ML WITH APPLICATOR (40EA/BX)

## (undated) DEVICE — SLEEVE VASO CALF MED - (10PR/CA)